# Patient Record
Sex: FEMALE | Race: WHITE | NOT HISPANIC OR LATINO | Employment: UNEMPLOYED | URBAN - METROPOLITAN AREA
[De-identification: names, ages, dates, MRNs, and addresses within clinical notes are randomized per-mention and may not be internally consistent; named-entity substitution may affect disease eponyms.]

---
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
## 2020-02-03 ENCOUNTER — TELEPHONE (OUTPATIENT)
Dept: OBSTETRICS AND GYNECOLOGY | Facility: CLINIC | Age: 20
End: 2020-02-03

## 2020-02-03 ENCOUNTER — TELEPHONE (OUTPATIENT)
Dept: PHARMACY | Facility: CLINIC | Age: 20
End: 2020-02-03

## 2020-02-03 ENCOUNTER — OFFICE VISIT (OUTPATIENT)
Dept: PSYCHIATRY | Facility: CLINIC | Age: 20
End: 2020-02-03
Payer: COMMERCIAL

## 2020-02-03 VITALS
SYSTOLIC BLOOD PRESSURE: 105 MMHG | DIASTOLIC BLOOD PRESSURE: 54 MMHG | BODY MASS INDEX: 21.37 KG/M2 | WEIGHT: 116.88 LBS | HEART RATE: 66 BPM

## 2020-02-03 DIAGNOSIS — F41.9 ANXIETY: ICD-10-CM

## 2020-02-03 DIAGNOSIS — F34.0 CYCLOTHYMIC DISORDER: ICD-10-CM

## 2020-02-03 DIAGNOSIS — F34.81 DMDD (DISRUPTIVE MOOD DYSREGULATION DISORDER): Primary | ICD-10-CM

## 2020-02-03 PROCEDURE — 99213 PR OFFICE/OUTPT VISIT, EST, LEVL III, 20-29 MIN: ICD-10-PCS | Mod: S$GLB,,, | Performed by: NURSE PRACTITIONER

## 2020-02-03 PROCEDURE — 99999 PR PBB SHADOW E&M-EST. PATIENT-LVL II: CPT | Mod: PBBFAC,,, | Performed by: NURSE PRACTITIONER

## 2020-02-03 PROCEDURE — 99999 PR PBB SHADOW E&M-EST. PATIENT-LVL II: ICD-10-PCS | Mod: PBBFAC,,, | Performed by: NURSE PRACTITIONER

## 2020-02-03 PROCEDURE — 99212 OFFICE O/P EST SF 10 MIN: CPT | Mod: PBBFAC | Performed by: NURSE PRACTITIONER

## 2020-02-03 PROCEDURE — 99213 OFFICE O/P EST LOW 20 MIN: CPT | Mod: S$GLB,,, | Performed by: NURSE PRACTITIONER

## 2020-02-03 RX ORDER — HYDROXYZINE PAMOATE 25 MG/1
25 CAPSULE ORAL 4 TIMES DAILY
Qty: 60 CAPSULE | Refills: 1 | Status: SHIPPED | OUTPATIENT
Start: 2020-02-03 | End: 2020-05-04 | Stop reason: SDUPTHER

## 2020-02-03 RX ORDER — ARIPIPRAZOLE 5 MG/1
5 TABLET ORAL DAILY
Qty: 30 TABLET | Refills: 2 | Status: SHIPPED | OUTPATIENT
Start: 2020-02-03 | End: 2020-04-29

## 2020-02-03 NOTE — PROGRESS NOTES
"Outpatient Psychiatry Follow-Up Visit (MD/NP)    2/3/2020    Clinical Status of Patient:  Outpatient (Ambulatory)    Chief Complaint:  Cydney Patel is a 19 y.o. female who presents today for follow-up of depression.  Met with patient.      Interval History and Content of Current Session:  Interim Events/Subjective Report/Content of Current Session:   Cydney Patel arrived early for her appointment. She is well dressed with good hygiene and grooming.  She reports she presents to restart her medications. She explains they said I had post-partum but she disagrees. She explains that her "baby daddy" was a piece of shit. She admits she didn't want to hold him for about a week at first due to fear and some depression. She feels she may have been over thinking things- when he smiled he wouldn't smile at her.  She admit she was never on her medications continuously. She is now motivated to stay on her medications to be well for her baby. Her sleep has been fluctuating - will get about 2 hours a night for about a week or 2 then crash for 2 days. She admits she never took the Prozac but felt good on Abilify and as needed vistaril. She reports her mom notices a difference and feels she is "much better" when on these medications. Mood overall "is fine" but reports her friends would say irritable but she feels its stress. Appetite - poor related to body image- wants prepregnancy body- eating one meal a day. Declines referral to nutritionist.  Energy is low. Denies SI at present but admits to some a few weeks ago. No thoughts of wanting to harm her baby. Denies SI/HI/AH/VH paranoia or delusions.       Psychotherapy:  · Target symptoms: depression, adjustment  · Why chosen therapy is appropriate versus another modality: relevant to diagnosis  · Outcome monitoring methods: self-report, observation, feedback from family  · Therapeutic intervention type: behavior modifying psychotherapy, supportive " "psychotherapy  · Topics discussed/themes: difficulty managing affect in interpersonal relationships, building skills sets for symptom management, symptom recognition, life stage transitional issues  · The patient's response to the intervention is guarded. The patient's progress toward treatment goals is fair.   · Duration of intervention: 17 minutes.    Review of Systems   · PSYCHIATRIC: Pertinant items are noted in the narrative.  · ENDOCRINE: No polydipsia or polyuria.  · INTEGUMENTARY: No rashes or lacerations.  · ENT: No dizziness, tinnitus or hearing loss.  · CARDIOVASCULAR: No tachycardia or chest pain.  · GENITOURINARY: No frequency, dysuria or sexual dysfunction.  · HEMATOLOGIC/LYMPHATIC: No excessive bleeding, prolonged or excessive bleeding after dental extraction/injury.    Past Medical, Family and Social History: The patient's past medical, family and social history have been reviewed and updated as appropriate within the electronic medical record - see encounter notes.    Compliance: no    Side effects: N/A    Risk Parameters:  Patient reports no suicidal ideation  Patient reports no homicidal ideation  Patient reports no self-injurious behavior  Patient reports no violent behavior    Exam (detailed: at least 9 elements; comprehensive: all 15 elements)   Constitutional  Vitals:  Most recent vital signs, dated less than 90 days prior to this appointment, were reviewed.   Vitals:    02/03/20 0947   BP: (!) 105/54   Pulse: 66   Weight: 53 kg (116 lb 13.5 oz)        General:  unremarkable, age appropriate     Musculoskeletal  Muscle Strength/Tone:  no tremor, no tic   Gait & Station:  non-ataxic     Psychiatric  Speech:  no latency; no press   Mood & Affect:  "agititated"   congruent and appropriate   Thought Process:  normal and logical   Associations:  intact   Thought Content:  normal, no suicidality, no homicidality, delusions, or paranoia   Insight:  has awareness of illness   Judgement: behavior is " "adequate to circumstances   Orientation:  grossly intact   Memory: intact for content of interview   Language: grossly intact   Attention Span & Concentration:  able to focus   Fund of Knowledge:  intact and appropriate to age and level of education     Assessment and Diagnosis   Status/Progress: Based on the examination today, the patient's problem(s) is/are inadequately controlled.  New problems have not been presented today.   Co-morbidities, Diagnostic uncertainty and Lack of compliance are not complicating management of the primary condition.  There are no active rule-out diagnoses for this patient at this time.     General Impression: Cydney Patel is a 18 y/o female who presents today for follow-up of depression and mood disorder. She describes symptoms of zechariah and depression since last visit. Reports her anxiety and depression are up and down. Describes extremes in mood "no happy medium." Denies self-harm or suicide attempts. Admits she relapsed on drugs since last visit and attributes and stopped taking her medications. Continues to have parent-child relationship problems.  Admits she did not take the Abilify as long as she feels she should have in order to see full affects. Denies SI/HI/AH/VH paranoia, delusions.  No overt symptoms of zechariah - no pressured rapid speech. Thought process linear and goal-directed. Affect appropriate. Encouraged restarting psychotherapy. Patient verbalized motivation for compliance with medications and all other elements of treatment plan.  Presents 2/3/20- requests to restart Abilify and vistaril after having her baby in November. Denies desire for anti-depressants- never took Prozac when prescribed.       ICD-10-CM ICD-9-CM   1. DMDD (disruptive mood dysregulation disorder) F34.81 296.99   2. Cyclothymic disorder F34.0 301.13   3. Anxiety F41.9 300.00       Intervention/Counseling/Treatment Plan   · Medication Management: Continue current medications.   ? Restart Vistrail 25 " mg as needed  ? Restart Abilify 5 mg daily  ? The risks and benefits of medication were discussed with the patient.  · Discussed diagnosis, risks and benefits of proposed treatment above vs alternative treatments vs no treatment, and potential side effects of these treatments. The patient expresses understanding of the above and displays the capacity to agree with this treatment given said understanding. Patient also agrees that, currently, the benefits outweigh the risks and would like to pursue treatment at this time.  · Encouraged Patient to keep future appointments.   · Take medications as prescribed and abstain from substance abuse.   · In the event of an emergency patient was advised to go to the emergency room     Referral to: Outpatient Psychotherapy    Return to Clinic: 3 months     Juana Concepcion DNP

## 2020-02-03 NOTE — TELEPHONE ENCOUNTER
----- Message from Sosa Meyers sent at 2/3/2020  9:24 AM CST -----  Contact: pt  Name of Who is Calling: pt    What is the request in detail: states she was suppose to receive a call in regards to her Mirena procedure being scheduled and she has been waiting 2 weeks now with no call. Please contact to further discuss and advise      Can the clinic reply by [facility]: no    [facility]: [phone]

## 2020-02-03 NOTE — TELEPHONE ENCOUNTER
Pt notified that she has a $0.00 copay for Mirena on her pharmacy benefit but she may have a copay for her office visit. She declined pharmacist consultation. Will confirm delivery with [facility] staff.

## 2020-02-04 NOTE — TELEPHONE ENCOUNTER
Spoke with pt. Advised pt we will be receiving her IUD on 2/6/20. Insertion appt scheduled for 2/10/20. Pt verbalized understanding.

## 2020-02-10 ENCOUNTER — PROCEDURE VISIT (OUTPATIENT)
Dept: OBSTETRICS AND GYNECOLOGY | Facility: CLINIC | Age: 20
End: 2020-02-10
Payer: COMMERCIAL

## 2020-02-10 VITALS
SYSTOLIC BLOOD PRESSURE: 116 MMHG | WEIGHT: 116.38 LBS | HEIGHT: 62 IN | BODY MASS INDEX: 21.42 KG/M2 | DIASTOLIC BLOOD PRESSURE: 72 MMHG

## 2020-02-10 DIAGNOSIS — Z30.430 ENCOUNTER FOR IUD INSERTION: Primary | ICD-10-CM

## 2020-02-10 LAB
B-HCG UR QL: NEGATIVE
CTP QC/QA: YES

## 2020-02-10 PROCEDURE — 81025 POCT URINE PREGNANCY: ICD-10-PCS | Mod: S$GLB,,, | Performed by: OBSTETRICS & GYNECOLOGY

## 2020-02-10 PROCEDURE — 58300 INSERT INTRAUTERINE DEVICE: CPT | Mod: S$GLB,,, | Performed by: OBSTETRICS & GYNECOLOGY

## 2020-02-10 PROCEDURE — 81025 URINE PREGNANCY TEST: CPT | Mod: S$GLB,,, | Performed by: OBSTETRICS & GYNECOLOGY

## 2020-02-10 PROCEDURE — 58300 INSERTION OF IUD: ICD-10-PCS | Mod: S$GLB,,, | Performed by: OBSTETRICS & GYNECOLOGY

## 2020-02-10 NOTE — PROCEDURES
Insertion of IUD  Date/Time: 2/10/2020 8:30 AM  Performed by: ABHISHEK Alaniz MD  Authorized by: ABHISHEK Alaniz MD     Consent:     Consent obtained:  Written    Consent given by:  Patient    Procedure risks and benefits discussed: yes      Patient questions answered: yes      Patient agrees, verbalizes understanding, and wants to proceed: yes      Educational handouts given: yes      Instructions and paperwork completed: yes    Procedure:     Pelvic exam performed: yes      Negative GC/chlamydia test: no      Negative urine pregnancy test: yes      Cervix cleaned and prepped: yes      Speculum placed in vagina: yes      Tenaculum applied to cervix: yes      Uterus sounded: yes      Uterus sound depth (cm):  8    IUD inserted with no complications: yes      Strings trimmed: yes    Post-procedure:     Patient tolerated procedure well: yes      Patient will follow up after next period: yes

## 2020-02-10 NOTE — PROGRESS NOTES
Discussed mental health with patient, she has started seeing a psychiatrist and restarted medications. She says her mood is currently stable.

## 2020-02-11 ENCOUNTER — OFFICE VISIT (OUTPATIENT)
Dept: URGENT CARE | Facility: CLINIC | Age: 20
End: 2020-02-11
Payer: COMMERCIAL

## 2020-02-11 VITALS
OXYGEN SATURATION: 100 % | HEART RATE: 65 BPM | TEMPERATURE: 98 F | SYSTOLIC BLOOD PRESSURE: 113 MMHG | BODY MASS INDEX: 21.35 KG/M2 | DIASTOLIC BLOOD PRESSURE: 71 MMHG | WEIGHT: 116 LBS | HEIGHT: 62 IN | RESPIRATION RATE: 16 BRPM

## 2020-02-11 DIAGNOSIS — J06.9 UPPER RESPIRATORY TRACT INFECTION, UNSPECIFIED TYPE: ICD-10-CM

## 2020-02-11 DIAGNOSIS — J02.9 SORE THROAT: Primary | ICD-10-CM

## 2020-02-11 LAB
CTP QC/QA: YES
CTP QC/QA: YES
FLUAV AG NPH QL: NEGATIVE
FLUBV AG NPH QL: NEGATIVE
MOLECULAR STREP A: NEGATIVE

## 2020-02-11 PROCEDURE — 87804 INFLUENZA ASSAY W/OPTIC: CPT | Mod: QW,S$GLB,, | Performed by: EMERGENCY MEDICINE

## 2020-02-11 PROCEDURE — 87804 POCT INFLUENZA A/B: ICD-10-PCS | Mod: 59,QW,S$GLB, | Performed by: EMERGENCY MEDICINE

## 2020-02-11 PROCEDURE — 87651 STREP A DNA AMP PROBE: CPT | Mod: QW,S$GLB,, | Performed by: EMERGENCY MEDICINE

## 2020-02-11 PROCEDURE — 99214 PR OFFICE/OUTPT VISIT, EST, LEVL IV, 30-39 MIN: ICD-10-PCS | Mod: 25,S$GLB,, | Performed by: EMERGENCY MEDICINE

## 2020-02-11 PROCEDURE — 99214 OFFICE O/P EST MOD 30 MIN: CPT | Mod: 25,S$GLB,, | Performed by: EMERGENCY MEDICINE

## 2020-02-11 PROCEDURE — 87651 POCT STREP A MOLECULAR: ICD-10-PCS | Mod: QW,S$GLB,, | Performed by: EMERGENCY MEDICINE

## 2020-02-11 NOTE — LETTER
February 11, 2020      [facility] [address]  [facility] [address]  Phone: [phone]  Fax: [phone]       Patient: Cydney Patel   YOB: 2000  Date of Visit: 02/11/2020    To Whom It May Concern:    Vik Patel  was at [facility] on 02/11/2020. She may return to work/school on 2/13/20 with no restrictions. If you have any questions or concerns, or if I can be of further assistance, please do not hesitate to contact me.    Sincerely,    Can Landry III, MD

## 2020-02-12 ENCOUNTER — HOSPITAL ENCOUNTER (EMERGENCY)
Facility: OTHER | Age: 20
Discharge: HOME | End: 2020-02-12
Attending: EMERGENCY MEDICINE
Payer: COMMERCIAL

## 2020-02-12 VITALS
WEIGHT: 116 LBS | RESPIRATION RATE: 18 BRPM | OXYGEN SATURATION: 98 % | HEIGHT: 62 IN | DIASTOLIC BLOOD PRESSURE: 59 MMHG | SYSTOLIC BLOOD PRESSURE: 106 MMHG | BODY MASS INDEX: 21.35 KG/M2 | TEMPERATURE: 98 F | HEART RATE: 82 BPM

## 2020-02-12 DIAGNOSIS — J02.9 PHARYNGITIS, UNSPECIFIED ETIOLOGY: Primary | ICD-10-CM

## 2020-02-12 LAB
B-HCG UR QL: NEGATIVE
CTP QC/QA: YES
DEPRECATED S PYO AG THROAT QL EIA: NEGATIVE

## 2020-02-12 PROCEDURE — 96372 THER/PROPH/DIAG INJ SC/IM: CPT

## 2020-02-12 PROCEDURE — 99284 EMERGENCY DEPT VISIT MOD MDM: CPT | Mod: 25

## 2020-02-12 PROCEDURE — 81025 URINE PREGNANCY TEST: CPT | Performed by: EMERGENCY MEDICINE

## 2020-02-12 PROCEDURE — 87147 CULTURE TYPE IMMUNOLOGIC: CPT | Mod: 59

## 2020-02-12 PROCEDURE — 63600175 PHARM REV CODE 636 W HCPCS: Mod: JG | Performed by: NURSE PRACTITIONER

## 2020-02-12 PROCEDURE — 87081 CULTURE SCREEN ONLY: CPT

## 2020-02-12 PROCEDURE — 87880 STREP A ASSAY W/OPTIC: CPT

## 2020-02-12 RX ORDER — DEXAMETHASONE SODIUM PHOSPHATE 4 MG/ML
8 INJECTION, SOLUTION INTRA-ARTICULAR; INTRALESIONAL; INTRAMUSCULAR; INTRAVENOUS; SOFT TISSUE
Status: COMPLETED | OUTPATIENT
Start: 2020-02-12 | End: 2020-02-12

## 2020-02-12 RX ORDER — KETOROLAC TROMETHAMINE 10 MG/1
10 TABLET, FILM COATED ORAL EVERY 6 HOURS
Qty: 12 TABLET | Refills: 0 | Status: SHIPPED | OUTPATIENT
Start: 2020-02-12 | End: 2020-05-04

## 2020-02-12 RX ADMIN — PENICILLIN G BENZATHINE 1.2 MILLION UNITS: 1200000 INJECTION, SUSPENSION INTRAMUSCULAR at 06:02

## 2020-02-12 RX ADMIN — DEXAMETHASONE SODIUM PHOSPHATE 8 MG: 4 INJECTION, SOLUTION INTRA-ARTICULAR; INTRALESIONAL; INTRAMUSCULAR; INTRAVENOUS; SOFT TISSUE at 06:02

## 2020-02-12 NOTE — PROGRESS NOTES
"Subjective:       Patient ID: Cydney Patel is a 19 y.o. female.    Vitals:  height is 5' 2" (1.575 m) and weight is 52.6 kg (116 lb). Her temperature is 97.6 °F (36.4 °C). Her blood pressure is 113/71 and her pulse is 65. Her respiration is 16 and oxygen saturation is 100%.     Chief Complaint: Sore Throat    Pt states sinus congestion with sore throat since yesterday.    Sore Throat    This is a new problem. The current episode started yesterday. The problem has been gradually worsening. The pain is worse on the right side. There has been no fever. The pain is at a severity of 7/10. Associated symptoms include congestion and swollen glands. Pertinent negatives include no coughing, diarrhea, headaches, shortness of breath or vomiting. Treatments tried: nyquil. The treatment provided mild relief.       Constitution: Negative for chills, fatigue and fever.   HENT: Positive for congestion, sinus pressure and sore throat.    Neck: Negative for painful lymph nodes.   Cardiovascular: Negative for chest pain and leg swelling.   Eyes: Negative for double vision and blurred vision.   Respiratory: Negative for cough and shortness of breath.    Gastrointestinal: Negative for nausea, vomiting and diarrhea.   Genitourinary: Negative for dysuria, frequency, urgency and history of kidney stones.   Musculoskeletal: Negative for joint pain, joint swelling, muscle cramps and muscle ache.   Skin: Negative for color change, pale, rash and bruising.   Allergic/Immunologic: Negative for seasonal allergies.   Neurological: Negative for dizziness, history of vertigo, light-headedness, passing out and headaches.   Hematologic/Lymphatic: Negative for swollen lymph nodes.   Psychiatric/Behavioral: Negative for nervous/anxious, sleep disturbance and depression. The patient is not nervous/anxious.        Objective:      Physical Exam   Constitutional: She is oriented to person, place, and time. She appears well-developed and " well-nourished. She is cooperative.  Non-toxic appearance. She does not have a sickly appearance. She does not appear ill. No distress.   HENT:   Head: Normocephalic and atraumatic.   Right Ear: Hearing, tympanic membrane, external ear and ear canal normal.   Left Ear: Hearing, tympanic membrane, external ear and ear canal normal.   Nose: Rhinorrhea present. No mucosal edema or nasal deformity. No epistaxis. Right sinus exhibits no maxillary sinus tenderness and no frontal sinus tenderness. Left sinus exhibits no maxillary sinus tenderness and no frontal sinus tenderness.   Mouth/Throat: Uvula is midline and mucous membranes are normal. No trismus in the jaw. Normal dentition. No uvula swelling. Oropharyngeal exudate and posterior oropharyngeal erythema present. No posterior oropharyngeal edema.   Eyes: Conjunctivae and lids are normal. No scleral icterus.   Neck: Trachea normal, full passive range of motion without pain and phonation normal. Neck supple. No neck rigidity. No edema and no erythema present.   Cardiovascular: Normal rate, regular rhythm, normal heart sounds, intact distal pulses and normal pulses.   Pulmonary/Chest: Effort normal and breath sounds normal. No respiratory distress. She has no decreased breath sounds. She has no rhonchi.   Occasional cough on exam   Abdominal: Normal appearance.   Musculoskeletal: Normal range of motion. She exhibits no edema or deformity.   Neurological: She is alert and oriented to person, place, and time. She exhibits normal muscle tone. Coordination normal.   Skin: Skin is warm, dry, intact, not diaphoretic and not pale.   Psychiatric: She has a normal mood and affect. Her speech is normal and behavior is normal. Judgment and thought content normal. Cognition and memory are normal.   Nursing note and vitals reviewed.        Assessment:       1. Sore throat    2. Upper respiratory tract infection, unspecified type        Plan:         Sore throat  -     POCT Influenza  A/B  -     POCT Strep A, Molecular    Upper respiratory tract infection, unspecified type          Patient Instructions     Zyrtec, Claritin, or Allegra OTC as directed for the next 7 days    Flonase OTC as directed for the next 7 days    Salt Water Nasal Spray OTC 3x/day for the next 7 days    Tylenol 500mg 2 tabs by mouth every 8 hours  Motrin 200mg 2 tabs by mouth every 8 hours  Alternate Tylenol and Motrin every 4hours    Mucinex or Robitussin OTC as directed for cough    Sudafed as directed for runny nose and congestion      Viral Upper Respiratory Illness (Adult)  You have a viral upper respiratory illness (URI), which is another term for the common cold. This illness is contagious during the first few days. It is spread through the air by coughing and sneezing. It may also be spread by direct contact (touching the sick person and then touching your own eyes, nose, or mouth). Frequent handwashing will decrease risk of spread. Most viral illnesses go away within 7 to 10 days with rest and simple home remedies. Sometimes the illness may last for several weeks. Antibiotics will not kill a virus, and they are generally not prescribed for this condition.    Home care  · If symptoms are severe, rest at home for the first 2 to 3 days. When you resume activity, don't let yourself get too tired.  · Avoid being exposed to cigarette smoke (yours or others).  · You may use acetaminophen or ibuprofen to control pain and fever, unless another medicine was prescribed. (Note: If you have chronic liver or kidney disease, have ever had a stomach ulcer or gastrointestinal bleeding, or are taking blood-thinning medicines, talk with your healthcare provider before using these medicines.) Aspirin should never be given to anyone under 18 years of age who is ill with a viral infection or fever. It may cause severe liver or brain damage.  · Your appetite may be poor, so a light diet is fine. Avoid dehydration by drinking 6 to 8  glasses of fluids per day (water, soft drinks, juices, tea, or soup). Extra fluids will help loosen secretions in the nose and lungs.  · Over-the-counter cold medicines will not shorten the length of time youre sick, but they may be helpful for the following symptoms: cough, sore throat, and nasal and sinus congestion. (Note: Do not use decongestants if you have high blood pressure.)  Follow-up care  Follow up with your healthcare provider, or as advised.  When to seek medical advice  Call your healthcare provider right away if any of these occur:  · Cough with lots of colored sputum (mucus)  · Severe headache; face, neck, or ear pain  · Difficulty swallowing due to throat pain  · Fever of 100.4°F (38°C)  Call 911, or get immediate medical care  Call emergency services right away if any of these occur:  · Chest pain, shortness of breath, wheezing, or difficulty breathing  · Coughing up blood  · Inability to swallow due to throat pain  Date Last Reviewed: 9/13/2015  © 9846-4469 [employer]. [address] [place] [address], [place] [address]. All rights reserved. This information is not intended as a substitute for professional medical care. Always follow your healthcare professional's instructions.

## 2020-02-12 NOTE — DISCHARGE INSTRUCTIONS
Thank you for allowing me to care for you today.  I hope our treatment plan will make you feel better in the next few days.  In order for me to take better care of my future patients and improve our Emergency Department, I would appreciate if you can provide us with feedback.  In the next few days, you may receive a survey in the mail.  If you do, it would mean a great deal to me if you would please take the time to complete it.    Thank you and I hope you feel better.  Radha Collazo NP

## 2020-02-12 NOTE — ED PROVIDER NOTES
Encounter Date: 2/12/2020       History     Chief Complaint   Patient presents with    Sore Throat     +Pt to ED with sore throat x3-4 days, seen at  and told she did not have strep throat. White patches noted to tonsils. Pt reporting painful swallowing, fever, chills.      The patient is a 19-year-old female with a past medical history of migraines, allergies, panic attacks, psychiatric hospitalization, and depression who presents to the ED complaining of fever, sore throat, and chills for the past 3-4 days.  No cough.  No sick contacts.  She was seen yesterday at urgent care where flu and strep swabs were both negative. No prescriptions provided at the time.  Patient reports that symptoms are worse today.  She does have a 3-month-old at home.  No other treatment attempted prior to arrival.    The history is provided by the patient.     Review of patient's allergies indicates:   Allergen Reactions    No known drug allergies      Past Medical History:   Diagnosis Date    Allergy     Depression     Headache(784.0)     migraine    History of psychiatric hospitalization     Hx of psychiatric care     Oppositional defiant disorder     Panic attacks     Psychiatric problem     Self-harming behavior     Suicide attempt     Therapy      No past surgical history on file.  Family History   Problem Relation Age of Onset    Stroke Father 43        unknown cause    Hypertension Father     Anxiety disorder Father     ADD / ADHD Sister     Migraines Mother     Anxiety disorder Mother     Heart attack Maternal Grandmother     Migraines Maternal Grandmother     Heart attack Maternal Grandfather     Stroke Paternal Grandmother     Heart attack Paternal Grandmother     Stroke Paternal Grandfather     Diabetes Neg Hx     Early death Neg Hx     Breast cancer Neg Hx     Colon cancer Neg Hx     Ovarian cancer Neg Hx      Social History     Tobacco Use    Smoking status: Never Smoker    Smokeless tobacco:  Never Used   Substance Use Topics    Alcohol use: Yes     Comment: socially    Drug use: No     Review of Systems   Constitutional: Positive for chills and fever.   HENT: Positive for sore throat.    Respiratory: Negative for shortness of breath.    Cardiovascular: Negative for chest pain.   Gastrointestinal: Negative for nausea and vomiting.   Genitourinary: Negative for dysuria.   Musculoskeletal: Negative for back pain.   Skin: Negative for rash.   Neurological: Negative for weakness.   Hematological: Does not bruise/bleed easily.       Physical Exam     Initial Vitals [02/12/20 1655]   BP Pulse Resp Temp SpO2   119/69 109 18 98.5 °F (36.9 °C) 97 %      MAP       --         Physical Exam    Nursing note and vitals reviewed.  Constitutional: She appears well-developed and well-nourished.  Non-toxic appearance. No distress.   The patient is alert, oriented, and nontoxic-appearing.  No muffled or hot potato voice.   HENT:   Head: Normocephalic and atraumatic.   Right Ear: Hearing, tympanic membrane, external ear and ear canal normal.   Left Ear: Hearing, tympanic membrane, external ear and ear canal normal.   Nose: Nose normal.   Mouth/Throat: Uvula is midline and mucous membranes are normal. No trismus in the jaw. Oropharyngeal exudate, posterior oropharyngeal edema and posterior oropharyngeal erythema present. No tonsillar abscesses.   Eyes: Conjunctivae and EOM are normal.   Neck: Full passive range of motion without pain. Neck supple.   Cardiovascular: Normal rate, regular rhythm, normal heart sounds and normal pulses. Exam reveals no gallop and no friction rub.    No murmur heard.  Pulmonary/Chest: Effort normal and breath sounds normal. No respiratory distress. She has no wheezes. She has no rhonchi. She has no rales.   Musculoskeletal: Normal range of motion.   Lymphadenopathy:     She has cervical adenopathy.   Neurological: She is alert and oriented to person, place, and time. She has normal strength.  Gait normal.   Skin: Skin is warm, dry and intact. Capillary refill takes less than 2 seconds. No rash noted.   Psychiatric: She has a normal mood and affect. Her speech is normal and behavior is normal. Judgment and thought content normal. Cognition and memory are normal.         ED Course   Procedures  Labs Reviewed   THROAT SCREEN, RAPID   POCT URINE PREGNANCY          Imaging Results    None          Medical Decision Making:   History:   Old Medical Records: I decided to obtain old medical records.  ED Management:  Based upon patient's history and physical exam, I believe they have streptococcal pharyngitis.  Centor score is 4.  I will treat based on Centor criteria, particularly since the patient has a 3-month-old at home.  I do not see any evidence of peritonsillar abscess, retropharyngeal abscess, sepsis, meningitis, epiglotitis, airway difficulty, or severe dehydration.  The patient will be treated with antibiotics, given specific return precautions, and instructed to followup with her regular doctor.  Magic mouthwash and anti-inflammatories also prescribed for symptomatic relief.    Plan of care discussed with the patient and they voiced understanding and agreement.  Patient advised to follow-up with PCP in 2 days for further evaluation.   Patient was stable for discharge.                                   Clinical Impression:       ICD-10-CM ICD-9-CM   1. Pharyngitis, unspecified etiology J02.9 462                             Radha Collazo NP  02/12/20 4578

## 2020-02-12 NOTE — ED TRIAGE NOTES
Pt reports that she was seen at Urgent Care yesterday for sore throat x 3 days. She reports fever at home, denies cough. She presents today for increased pain and swelling to throat. Pt is AAO x 3, answers questions appropriately

## 2020-02-12 NOTE — PATIENT INSTRUCTIONS
Zyrtec, Claritin, or Allegra OTC as directed for the next 7 days    Flonase OTC as directed for the next 7 days    Salt Water Nasal Spray OTC 3x/day for the next 7 days    Tylenol 500mg 2 tabs by mouth every 8 hours  Motrin 200mg 2 tabs by mouth every 8 hours  Alternate Tylenol and Motrin every 4hours    Mucinex or Robitussin OTC as directed for cough    Sudafed as directed for runny nose and congestion      Viral Upper Respiratory Illness (Adult)  You have a viral upper respiratory illness (URI), which is another term for the common cold. This illness is contagious during the first few days. It is spread through the air by coughing and sneezing. It may also be spread by direct contact (touching the sick person and then touching your own eyes, nose, or mouth). Frequent handwashing will decrease risk of spread. Most viral illnesses go away within 7 to 10 days with rest and simple home remedies. Sometimes the illness may last for several weeks. Antibiotics will not kill a virus, and they are generally not prescribed for this condition.    Home care  · If symptoms are severe, rest at home for the first 2 to 3 days. When you resume activity, don't let yourself get too tired.  · Avoid being exposed to cigarette smoke (yours or others).  · You may use acetaminophen or ibuprofen to control pain and fever, unless another medicine was prescribed. (Note: If you have chronic liver or kidney disease, have ever had a stomach ulcer or gastrointestinal bleeding, or are taking blood-thinning medicines, talk with your healthcare provider before using these medicines.) Aspirin should never be given to anyone under 18 years of age who is ill with a viral infection or fever. It may cause severe liver or brain damage.  · Your appetite may be poor, so a light diet is fine. Avoid dehydration by drinking 6 to 8 glasses of fluids per day (water, soft drinks, juices, tea, or soup). Extra fluids will help loosen secretions in the nose and  lungs.  · Over-the-counter cold medicines will not shorten the length of time youre sick, but they may be helpful for the following symptoms: cough, sore throat, and nasal and sinus congestion. (Note: Do not use decongestants if you have high blood pressure.)  Follow-up care  Follow up with your healthcare provider, or as advised.  When to seek medical advice  Call your healthcare provider right away if any of these occur:  · Cough with lots of colored sputum (mucus)  · Severe headache; face, neck, or ear pain  · Difficulty swallowing due to throat pain  · Fever of 100.4°F (38°C)  Call 911, or get immediate medical care  Call emergency services right away if any of these occur:  · Chest pain, shortness of breath, wheezing, or difficulty breathing  · Coughing up blood  · Inability to swallow due to throat pain  Date Last Reviewed: 9/13/2015  © 6058-9609 [employer]. [address] [place] [address], [place] [address]. All rights reserved. This information is not intended as a substitute for professional medical care. Always follow your healthcare professional's instructions.

## 2020-02-14 LAB
BACTERIA THROAT CULT: ABNORMAL
BACTERIA THROAT CULT: ABNORMAL

## 2020-04-29 RX ORDER — ARIPIPRAZOLE 5 MG/1
TABLET ORAL
Qty: 30 TABLET | Refills: 0 | Status: SHIPPED | OUTPATIENT
Start: 2020-04-29 | End: 2020-05-04 | Stop reason: SDUPTHER

## 2020-05-01 ENCOUNTER — PATIENT MESSAGE (OUTPATIENT)
Dept: PSYCHIATRY | Facility: CLINIC | Age: 20
End: 2020-05-01

## 2020-05-04 ENCOUNTER — OFFICE VISIT (OUTPATIENT)
Dept: PSYCHIATRY | Facility: CLINIC | Age: 20
End: 2020-05-04
Payer: COMMERCIAL

## 2020-05-04 DIAGNOSIS — F34.0 CYCLOTHYMIC DISORDER: Primary | ICD-10-CM

## 2020-05-04 DIAGNOSIS — F41.9 ANXIETY: ICD-10-CM

## 2020-05-04 DIAGNOSIS — F34.81 DMDD (DISRUPTIVE MOOD DYSREGULATION DISORDER): ICD-10-CM

## 2020-05-04 PROCEDURE — 99213 PR OFFICE/OUTPT VISIT, EST, LEVL III, 20-29 MIN: ICD-10-PCS | Mod: 95,,, | Performed by: NURSE PRACTITIONER

## 2020-05-04 PROCEDURE — 99213 OFFICE O/P EST LOW 20 MIN: CPT | Mod: 95,,, | Performed by: NURSE PRACTITIONER

## 2020-05-04 RX ORDER — HYDROXYZINE PAMOATE 25 MG/1
25 CAPSULE ORAL 4 TIMES DAILY
Qty: 60 CAPSULE | Refills: 1 | Status: SHIPPED | OUTPATIENT
Start: 2020-05-04 | End: 2020-08-17

## 2020-05-04 RX ORDER — ARIPIPRAZOLE 10 MG/1
10 TABLET ORAL DAILY
Qty: 30 TABLET | Refills: 2 | Status: SHIPPED | OUTPATIENT
Start: 2020-05-04 | End: 2020-08-17

## 2020-05-04 RX ORDER — ESCITALOPRAM OXALATE 10 MG/1
10 TABLET ORAL DAILY
Qty: 90 TABLET | Refills: 0 | Status: SHIPPED | OUTPATIENT
Start: 2020-05-04 | End: 2020-10-15

## 2020-05-04 NOTE — PROGRESS NOTES
"Outpatient Psychiatry Follow-Up Visit (MD/NP)    5/4/2020    The patient location is: [address] [place] [address]  The chief complaint leading to consultation is: anxiety and irritablity  Visit type: audiovisual  Total time spent with patient: 12 minutes  Each patient to whom he or she provides medical services by telemedicine is:  (1) informed of the relationship between the physician and patient and the respective role of any other health care provider with respect to management of the patient; and (2) notified that he or she may decline to receive medical services by telemedicine and may withdraw from such care at any time.    Clinical Status of Patient:  Outpatient (Ambulatory)    Chief Complaint:  Cydney Patel is a 20 y.o. female who presents today for follow-up of depression.  Met with patient.      Interval History and Content of Current Session:  Interim Events/Subjective Report/Content of Current Session:   Cydney Patel checked in on time for her virtual visit. She does not feel Abilify is working how it use to be - having mood swings and increased anxiety. Denies new stressors. Reports waking up 'irritable this am but then within the minute I can change to a bubbly person."  Reports having migraines again but does not feel they are related to her anxiety. Denies ASE to Abilifiy.  Discussed increasing Abilify and adding an antidepressant. Did not feel Prozac helped her - reports being on it for at least a year. Discussed trial of Lexapro. Sleeping well except when her baby wakes up during the night - which is happening pretty often lately. Appetite is good, energy depends on the day.  Has not started therapy but admits it did help but concerned about insurance coverage. Denies SI/HI/AVH. No objective s/sx of psychosis or zechariah. Patient verbalized motivation for compliance with medications and all other elements of treatment plan.       Psychotherapy:  · Target symptoms: mood swings   · Why chosen " "therapy is appropriate versus another modality: relevant to diagnosis, patient responds to this modality  · Outcome monitoring methods: self-report, observation  · Therapeutic intervention type: insight oriented psychotherapy, behavior modifying psychotherapy, supportive psychotherapy  · Topics discussed/themes: difficulty managing affect in interpersonal relationships, building skills sets for symptom management, symptom recognition  · The patient's response to the intervention is accepting. The patient's progress toward treatment goals is fair.   · Duration of intervention: 5 minutes.    Review of Systems   · PSYCHIATRIC: Pertinant items are noted in the narrative.  · CONSTITUTIONAL: No weight gain or loss.   · ENT: No dizziness, tinnitus or hearing loss.  · RESPIRATORY: No shortness of breath.  · CARDIOVASCULAR: No tachycardia or chest pain.  · GASTROINTESTINAL: No nausea, vomiting, pain, constipation or diarrhea.    Past Medical, Family and Social History: The patient's past medical, family and social history have been reviewed and updated as appropriate within the electronic medical record - see encounter notes.    Compliance: yes    Side effects: None    Risk Parameters:  Patient reports no suicidal ideation  Patient reports no homicidal ideation  Patient reports no self-injurious behavior  Patient reports no violent behavior    Exam (detailed: at least 9 elements; comprehensive: all 15 elements)   Constitutional      General:  unremarkable, age appropriate   Psychiatric  Speech:  no latency; no press   Mood & Affect:  "ok"  congruent and appropriate   Thought Process:  normal and logical   Associations:  intact   Thought Content:  normal, no suicidality, no homicidality, delusions, or paranoia   Insight:  has awareness of illness   Judgement: behavior is adequate to circumstances   Orientation:  grossly intact   Memory: intact for content of interview   Language: grossly intact   Attention Span & " "Concentration:  able to focus   Fund of Knowledge:  intact and appropriate to age and level of education     Assessment and Diagnosis   Status/Progress: Based on the examination today, the patient's problem(s) is/are adequately but not ideally controlled.  New problems have not been presented today.   Co-morbidities, Diagnostic uncertainty and Lack of compliance are not complicating management of the primary condition.  There are no active rule-out diagnoses for this patient at this time.     General Impression: Cydney Patel is a 18 y/o female who presents today for follow-up of depression and mood disorder. She describes symptoms of zechariah and depression since last visit. Reports her anxiety and depression are up and down. Describes extremes in mood "no happy medium." Denies self-harm or suicide attempts. Admits she relapsed on drugs since last visit and attributes and stopped taking her medications. Continues to have parent-child relationship problems.  Admits she did not take the Abilify as long as she feels she should have in order to see full affects. Denies SI/HI/AH/VH paranoia, delusions.  No overt symptoms of zechariah - no pressured rapid speech. Thought process linear and goal-directed. Affect appropriate. Encouraged restarting psychotherapy. Patient verbalized motivation for compliance with medications and all other elements of treatment plan.  Presents 2/3/20- requests to restart Abilify and vistaril after having her baby in November. Denies desire for anti-depressants- never took Prozac when prescribed. Presents 5/4/20 - increased anxiety and irritability. Agreeable to trial of Lexapro.       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 DMDD (disruptive mood dysregulation disorder) F34.81 296.99   3. Anxiety F41.9 300.00       Intervention/Counseling/Treatment Plan   · Medication Management: Continue current medications.   ?  Vistrail 25 mg as needed  ? Increase Abilify 10 mg daily  ? Start " Lexapro 10 mg daily  ? The risks and benefits of medication were discussed with the patient.  · Discussed diagnosis, risks and benefits of proposed treatment above vs alternative treatments vs no treatment, and potential side effects of these treatments. The patient expresses understanding of the above and displays the capacity to agree with this treatment given said understanding. Patient also agrees that, currently, the benefits outweigh the risks and would like to pursue treatment at this time.  · Encouraged Patient to keep future appointments.   · Take medications as prescribed and abstain from substance abuse.   · In the event of an emergency patient was advised to go to the emergency room     Referral to: encouraged psychotherapy    Return to Clinic: 3 months     Juana Concepcion DNP

## 2020-07-17 ENCOUNTER — OFFICE VISIT (OUTPATIENT)
Dept: URGENT CARE | Facility: CLINIC | Age: 20
End: 2020-07-17
Payer: COMMERCIAL

## 2020-07-17 VITALS
HEART RATE: 99 BPM | TEMPERATURE: 99 F | OXYGEN SATURATION: 99 % | SYSTOLIC BLOOD PRESSURE: 115 MMHG | DIASTOLIC BLOOD PRESSURE: 67 MMHG

## 2020-07-17 DIAGNOSIS — S06.0X0A CONCUSSION WITHOUT LOSS OF CONSCIOUSNESS, INITIAL ENCOUNTER: ICD-10-CM

## 2020-07-17 DIAGNOSIS — T74.91XA DOMESTIC ABUSE OF ADULT, INITIAL ENCOUNTER: ICD-10-CM

## 2020-07-17 DIAGNOSIS — R11.0 NAUSEA: ICD-10-CM

## 2020-07-17 DIAGNOSIS — S09.90XA INJURY OF HEAD, INITIAL ENCOUNTER: Primary | ICD-10-CM

## 2020-07-17 PROCEDURE — S0119 PR ONDANSETRON, ORAL, 4MG: ICD-10-PCS | Mod: S$GLB,,, | Performed by: PHYSICIAN ASSISTANT

## 2020-07-17 PROCEDURE — 99214 OFFICE O/P EST MOD 30 MIN: CPT | Mod: S$GLB,,, | Performed by: PHYSICIAN ASSISTANT

## 2020-07-17 PROCEDURE — 99214 PR OFFICE/OUTPT VISIT, EST, LEVL IV, 30-39 MIN: ICD-10-PCS | Mod: S$GLB,,, | Performed by: PHYSICIAN ASSISTANT

## 2020-07-17 PROCEDURE — S0119 ONDANSETRON 4 MG: HCPCS | Mod: S$GLB,,, | Performed by: PHYSICIAN ASSISTANT

## 2020-07-17 RX ORDER — ONDANSETRON 4 MG/1
4 TABLET, ORALLY DISINTEGRATING ORAL
Status: COMPLETED | OUTPATIENT
Start: 2020-07-17 | End: 2020-07-17

## 2020-07-17 RX ORDER — ONDANSETRON 4 MG/1
4 TABLET, ORALLY DISINTEGRATING ORAL EVERY 8 HOURS PRN
Qty: 10 TABLET | Refills: 0 | Status: SHIPPED | OUTPATIENT
Start: 2020-07-17 | End: 2020-10-15

## 2020-07-17 RX ADMIN — ONDANSETRON 4 MG: 4 TABLET, ORALLY DISINTEGRATING ORAL at 05:07
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
## 2020-07-17 NOTE — PATIENT INSTRUCTIONS
- Rest.    - Drink plenty of fluids.    - Acetaminophen (tylenol) or Ibuprofen (advil,motrin) as directed as needed for fever/pain. Avoid tylenol if you have a history of liver disease. Do not take ibuprofen if you have a history of GI bleeding, kidney disease, or if you take blood thinners.   - you can take ibuprofen 600-800 mg every 6-8 hours for pain and inflammation.  You can also take Tylenol/acetaminophen 650-1000 mg every 6-8 hours for added pain relief.    - Ice for 15-20 minutes at a time for the next 24-48 hours.    - Elevate when possible.     - Take zofran (ondansetron) 4-8 mg every 8 hours as needed, as prescribed for nausea    - based on your evaluation today, I do not see evidence of skull fracture or brain bleed. Your neurological exam is normal.  You likely have a concussion based on the symptoms you are experiencing. See attached regarding head injury precautions. At this time, I do not think you need a CT of the head in the ER; though as discussed, we cannot rule this out in urgent care without CT.  Monitor symptoms closely, and if you develop any new or worsening symptoms, go immediately to the ER.  Call 911 or go to ER if you feel unsafe in domestic relationship.     - Follow up with your PCP or specialty clinic as directed in the next 1-2 weeks if not improved or as needed.  You can call [phone] to schedule an appointment with the appropriate provider.    - Go to the ER or seek medical attention immediately if you develop new or worsening symptoms.    - You must understand that you have received an Urgent Care treatment only and that you may be released before all of your medical problems are known or treated.   - You, the patient, will arrange for follow up care as instructed.   - If your condition worsens or fails to improve we recommend that you receive another evaluation at the ER immediately or contact your PCP to discuss your concerns or return here.         Head Injury  (Adult)    You have a head injury. It does not appear serious at this time. But symptoms of a more serious problem, such as a mild brain injury (concussion) or bruising or bleeding in the brain, may appear later. For this reason, you or someone caring for you will need to watch for the symptoms listed below. Once youre home, also be sure to follow any care instructions youre given.  Home care  Watch for the following symptoms  Seek emergency medical care if you have any of these symptoms over the next hours to days:   · Headache  · Nausea or vomiting  · Dizziness  · Sensitivity to light or noise  · Unusual sleepiness or grogginess  · Trouble falling asleep  · Personality changes  · Vision changes  · Memory loss  · Confusion  · Trouble walking or clumsiness  · Loss of consciousness (even for a short time)  · Inability to be awakened  · Stiff neck  · Weakness or numbness in any part of the body  · Seizures  General care  · If you were prescribed medicines for pain, use them as directed. Note: Dont take other medicines for pain without talking to your provider first.  · To help reduce swelling and pain, apply a cold source to the injured area for up to 20 minutes at a time. Do this as often as directed. Use a cold pack or bag of ice wrapped in a thin towel. Never apply a cold source directly to the skin.  · If you have cuts or scrapes as a result of your head injury, care for them as directed.  · For the next 24 hours (or longer, if instructed):  ¨ Dont drink alcohol or use sedatives or other medicines that make you sleepy.  ¨ Dont drive or operate machinery.  ¨ Dont do anything strenuous, such as heavy lifting or straining.  ¨ Limit tasks that require concentration. This includes reading, using a smartphone or computer, watching TV, and playing video games.  ¨ Dont return to sports or other activities that could result in another head injury.  Follow-up care  Follow up with your healthcare provider, or as  directed. If imaging tests were done, they will be reviewed by a doctor. You will be told the results and any new findings that may affect your care.  When to seek medical advice  Call your healthcare provider right away if any of these occur:  · Pain doesnt get better or worsens  · New or increased swelling or bruising  · Fever of 100.4°F (38°C) or higher, or as directed by your provider  · Increased redness, warmth, drainage, or bleeding from the injured area  · Fluid drainage or bleeding from the nose or ears  · Any depression or bony abnormality in the injured area  Date Last Reviewed: 9/26/2015  © 9614-8595 [employer]. [address] [place] [address], [place] [address]. All rights reserved. This information is not intended as a substitute for professional medical care. Always follow your healthcare professional's instructions.        Discharge Instructions for Concussion  You have been diagnosed with a concussion, a type of brain injury caused by a sudden impact to your head. It can also be caused by sudden movement of your brain inside your head, such as from forceful shaking. Some concussions are mild. Most people recover completely from mild concussions. But recovery may take days, weeks, or months. For some, symptoms may last even longer. Early care and monitoring are important to prevent long-term complications.  Home care  Do's and don'ts:   · Ask a friend or family member to stay with you for a few days. You should not be alone until you know how the injury has affected you.  · Tell your caregiver to wake you every 2 to 3 hours during the first night. Your caregiver should call 911 if he or she cant wake you, or if you are confused.  · Dont take any medicine--not even aspirin--unless your healthcare provider says it's OK. If you have a headache, try placing a cold, damp cloth on your forehead.  · Eat light. Clear liquids, such as broth or gelatin, are a good choice.  · Don't drink alcohol or use  "any recreational drugs.   · Don't return to sports or any activity that could cause you to hit your head until all symptoms are gone and you have been cleared by your doctor. A second head injury before full recovery from the first one can lead to serious brain injury.  · Avoid activities that require a lot of concentration or attention. This will allow your brain to rest and heal more quickly.  The best way to recover is to discuss symptoms with your healthcare provider and your family. Work closely with your healthcare provider and give your brain time to heal.  Follow-up care  Follow up with your healthcare provider, or as advised.     When to call your healthcare provider  Your caregiver should call 911 right away if you have fallen asleep, cannot be awakened, or you are confused.  Otherwise, call your healthcare provider right away if any of these occur:  · Vomiting  · Clear or bloody drainage from your nose or ear  · Constant drowsiness or trouble waking up  · Confusion or memory loss  · Blurred vision  · Trouble walking, talking, or concentrating  · Increased weakness or problems with coordination  · Constant headache that cant be relieved or gets worse  · Changes in behavior or personality   Date Last Reviewed: 11/5/2015  © 6701-7282 [employer]. [address] [place] [address]. All rights reserved. This information is not intended as a substitute for professional medical care. Always follow your healthcare professional's instructions.        Concussion    A concussion can be caused by a direct blow to the head, neck, face, or somewhere else on the body with the force being transmitted to the head. This may cause you to lose consciousness - be "knocked out" - but not always. Depending on the severity of the blow, it will take from a few hours up to a few days to get better. Sometimes symptoms may last a few months or longer. This is called post-concussion syndrome.  At first, you may " have a headache, nausea, vomiting, or dizziness. You may also have problems concentrating or remembering things. This is normal.  Symptoms should get better as the hours and days go by. Symptoms that get worse could be a sign of a more serious injury. This might be a bruise or bleeding in the brain. Thats why its important to watch for the warning signs listed below.  Home care  If your injury is mild and there are no serious signs or symptoms, your healthcare provider may recommend that you be monitored at home. If there is evidence that the injury is more serious, you will be monitored in the hospital. Follow these tips to help care for yourself at home:  · After a concussion, your healthcare provider may recommend that a family member or friend monitor you for 12 to 24 hours. They may be told to wake you every few hours during sleep to check for the signs below.  · If your face or scalp swells, apply an ice pack for 20 minutes every 1 to 2 hours. Do this until the swelling starts to go down. You can make an ice pack by putting ice cubes in a plastic bag and wrapping the bag in a towel.  · You may use acetaminophen to control pain, unless another pain medicine was prescribed. Do not use aspirin or ibuprofen after a head injury. If you have chronic liver or kidney disease, talk with your doctor before using these medicines. Also talk with your doctor if you ever had a stomach ulcer or gastrointestinal bleeding.  · For the next 24 hours:  ¨ Dont drink alcohol or take sedatives or medicines that make you sleepy.  ¨ Dont drive or operate machinery.  ¨ Avoid doing anything strenuous. Dont lift or strain.  · Dont return to sports or any activity that could cause you to hit your head until all symptoms are gone and you have been cleared by your doctor. A second head injury before fully recovering from the first one can lead to serious brain injury.  · Avoid doing activities that require a lot of concentration or a  lot of attention. This will allow your brain to rest and heal quicker.  Follow-up care  Follow up with your doctor in 1 week, or as directed.  Note: A radiologist will review any X-rays or CT scans that were taken. You will be told of any new findings that may affect your care.  When to seek medical advice  Call your healthcare provider right away if any of these occur:  · Repeated vomiting  · Headache or dizziness that is severe or gets worse  · Loss of consciousness  · Unusual drowsiness, or unable to wake up as usual  · Weakness or decreased ability to walk or move any limb  · Confusion, agitation, or change in behavior or speech, or memory loss  · Blurred vision  · Convulsion (seizure)  · Swelling on the scalp or face that gets worse  · Changes in pupil size (the black part of the eye)  · Redness, warmth, or pus from the swollen area  · Fluid draining from or bleeding from the nose or ears     Date Last Reviewed: 8/14/2015  © 4370-7858 [employer]. [address] [place] [address]. All rights reserved. This information is not intended as a substitute for professional medical care. Always follow your healthcare professional's instructions.        Coping with Concussion  Concussion is also known as mild traumatic brain injury (MTBI). It is often caused by a blow to the head, or a fall. You may have been unconscious for a few seconds or minutes after the injury. Or maybe you were dazed, confused, or saw stars. After this, you thought you were OK. Now, weeks or months later, youre having symptoms that may be caused by a concussion. The good news is that, in most people,  these symptoms will likely go away on their own. Most people with a concussion recover fully, with no need for treatment.     A cold compress can help relieve a headache.    What is a concussion?  A concussion is a mild form of brain injury. In some cases, the effects of a concussion go away within days of the injury. In  others, symptoms may continue for a few months. Fortunately, a concussion is temporary. Even when symptoms stay for months, they do go away over time. If they don't, or if your symptoms are worse, contact your healthcare provider.  Symptoms of a concussion  You may have noticed some of these symptoms:  · Headaches  · Irritability and other changes in behavior  · Problems remembering or concentrating  · Dizziness or lack of coordination  · Fatigue  · Problems sleeping  · Sensitivity to light and sound  · Vision changes  NOTE: If you have severe symptoms or trouble functioning, talk with your healthcare provider right away. If you had a more serious head injury than a concussion, you likely need treatment. Be sure to see your healthcare provider for an evaluation.   What you can do  Since the effects of a concussion go away over time, there isnt a lot you need to do. Be assured that this problem is temporary. Youll likely have a full recovery. In the meantime, talk with your healthcare provider about ways to relieve any symptoms that are bothering you. These tips may help:  · Don't return to sports or any activity that could cause you to hit your head until all symptoms are gone and you have been cleared by your doctor. A second head injury before fully recovering from the first one can lead to serious brain injury.  · Avoid doing activities that require a lot of concentration or a lot of attention. This will allow your brain to rest and heal more quickly.  · When you have a headache, put a cold compress or ice pack on the pain site. Rest in a quiet, darkened room.  · Stress can make symptoms worse. Help calm yourself by resting in a quiet place and imagining a peaceful scene. Relax your muscles by soaking in a hot bath or taking a hot shower.  · Take over-the-counter  acetaminophen to relieve headache pain. Take them as directed on the package. Do not take ibuprofen or aspirin after a head injury.  · If you become  dizzy, sit or lie down in a safe place until the sensation passes. Dont drive when you feel dizzy or disoriented.  · If youre having trouble sleeping, try to keep a regular sleep schedule. Go to bed and get up at the same time each day. Avoid or limit caffeine and nicotine. Also avoid alcohol. It may help you sleep at first, but your sleep will not be restful.  · Give yourself time to heal. Your recovery will take some time. When you have symptoms, remember that you wont feel this way forever. In time the symptoms will go away and youll be back to yourself.  If youre not feeling better  The effects of a concussion often go away in 7 to 10 days and the vast majority of people who have had a concussion have recovered after 3 months. If youre not feeling better as time passes, there may be something else going on. If your symptoms dont go away or you notice new ones, talk with your healthcare provider. He or she can help you get the treatment you need.   Date Last Reviewed: 8/17/2015  © 8694-7465 [employer]. [address] [place] [id number]. All rights reserved. This information is not intended as a substitute for professional medical care. Always follow your healthcare professional's instructions.

## 2020-07-24 ENCOUNTER — OFFICE VISIT (OUTPATIENT)
Dept: URGENT CARE | Facility: CLINIC | Age: 20
End: 2020-07-24
Payer: COMMERCIAL

## 2020-07-24 VITALS
DIASTOLIC BLOOD PRESSURE: 57 MMHG | HEART RATE: 102 BPM | SYSTOLIC BLOOD PRESSURE: 101 MMHG | BODY MASS INDEX: 21.16 KG/M2 | WEIGHT: 115 LBS | TEMPERATURE: 98 F | RESPIRATION RATE: 20 BRPM | OXYGEN SATURATION: 98 % | HEIGHT: 62 IN

## 2020-07-24 DIAGNOSIS — J02.0 STREP PHARYNGITIS: Primary | ICD-10-CM

## 2020-07-24 DIAGNOSIS — J02.9 SORE THROAT: ICD-10-CM

## 2020-07-24 LAB
CTP QC/QA: YES
MOLECULAR STREP A: POSITIVE

## 2020-07-24 PROCEDURE — 99214 PR OFFICE/OUTPT VISIT, EST, LEVL IV, 30-39 MIN: ICD-10-PCS | Mod: S$GLB,,, | Performed by: PHYSICIAN ASSISTANT

## 2020-07-24 PROCEDURE — 87651 STREP A DNA AMP PROBE: CPT | Mod: QW,S$GLB,, | Performed by: PHYSICIAN ASSISTANT

## 2020-07-24 PROCEDURE — 87651 POCT STREP A MOLECULAR: ICD-10-PCS | Mod: QW,S$GLB,, | Performed by: PHYSICIAN ASSISTANT

## 2020-07-24 PROCEDURE — 99214 OFFICE O/P EST MOD 30 MIN: CPT | Mod: S$GLB,,, | Performed by: PHYSICIAN ASSISTANT

## 2020-07-24 RX ORDER — AMOXICILLIN 500 MG/1
1000 CAPSULE ORAL EVERY 12 HOURS
Qty: 40 CAPSULE | Refills: 0 | Status: SHIPPED | OUTPATIENT
Start: 2020-07-24 | End: 2020-08-03

## 2020-07-24 NOTE — PATIENT INSTRUCTIONS
Pharyngitis: Strep (Confirmed)    You have had a positive test for strep throat. Strep throat is a contagious illness. It is spread by coughing, kissing or by touching others after touching your mouth or nose. Symptoms include throat pain that is worse with swallowing, aching all over, headache, and fever. It is treated with antibiotic medicine. This should help you start to feel better in 1 to 2 days.  Home care  · Rest at home. Drink plenty of fluids to you won't get dehydrated.  · No work or school for the first 2 days of taking the antibiotics. After this time, you will not be contagious. You can then return to school or work if you are feeling better.   · Take antibiotic medicine for the full 10 days, even if you feel better. This is very important to ensure the infection is treated. It is also important to prevent medicine-resistant germs from developing. If you were given an antibiotic shot, you don't need any more antibiotics.  · You may use acetaminophen or ibuprofen to control pain or fever, unless another medicine was prescribed for this. Talk with your doctor before taking these medicines if you have chronic liver or kidney disease. Also talk with your doctor if you have had a stomach ulcer or GI bleeding.  · Throat lozenges or sprays help reduce pain. Gargling with warm saltwater will also reduce throat pain. Dissolve 1/2 teaspoon of salt in 1 glass of warm water. This may be useful just before meals.   · Soft foods are OK. Avoid salty or spicy foods.  Follow-up care  Follow up with your healthcare provider or our staff if you don't get better over the next week.  When to seek medical advice  Call your healthcare provider right away if any of these occur:  · Fever of 100.4ºF (38ºC) or higher, or as directed by your healthcare provider  · New or worsening ear pain, sinus pain, or headache  · Painful lumps in the back of neck  · Stiff neck  · Lymph nodes getting larger or becoming soft in the  middle  · You can't swallow liquids or you can't open your mouth wide because of throat pain  · Signs of dehydration. These include very dark urine or no urine, sunken eyes, and dizziness.  · Trouble breathing or noisy breathing  · Muffled voice  · Rash  Date Last Reviewed: 4/13/2015  © 8512-9555 [employer]. [address] [place] [address], [place] [address]. All rights reserved. This information is not intended as a substitute for professional medical care. Always follow your healthcare professional's instructions.

## 2020-07-24 NOTE — PROGRESS NOTES
"Subjective:       Patient ID: Cydney Patel is a 20 y.o. female.    Vitals:  height is 5' 2" (1.575 m) and weight is 52.2 kg (115 lb). Her temperature is 98.1 °F (36.7 °C). Her blood pressure is 101/57 (abnormal) and her pulse is 102. Her respiration is 20 and oxygen saturation is 98%.     Chief Complaint: Sore Throat    19 y/o female presents with Sore throat for the past 3days. Pt reports having Strep throat earlier this year and her sxs are exactly the same. Pt denies fevers, chills, cough, dyspnea, SOB, nasal congestion, ear pain.     Sore Throat   This is a new problem. The pain is worse on the right side. The pain is at a severity of 6/10. Associated symptoms include swollen glands and trouble swallowing. Pertinent negatives include no congestion, coughing, diarrhea, headaches, shortness of breath or vomiting. She has tried acetaminophen for the symptoms.       Constitution: Negative for chills, fatigue and fever.   HENT: Positive for sore throat and trouble swallowing. Negative for congestion.    Neck: Negative for painful lymph nodes.   Cardiovascular: Negative for chest pain and leg swelling.   Eyes: Negative for double vision and blurred vision.   Respiratory: Negative for cough and shortness of breath.    Gastrointestinal: Negative for nausea, vomiting and diarrhea.   Genitourinary: Negative for dysuria, frequency, urgency and history of kidney stones.   Musculoskeletal: Negative for joint pain, joint swelling, muscle cramps and muscle ache.   Skin: Negative for color change, pale, rash and bruising.   Allergic/Immunologic: Negative for seasonal allergies.   Neurological: Negative for dizziness, history of vertigo, light-headedness, passing out and headaches.   Hematologic/Lymphatic: Negative for swollen lymph nodes.   Psychiatric/Behavioral: Negative for nervous/anxious, sleep disturbance and depression. The patient is not nervous/anxious.        Objective:      Physical Exam   Constitutional: She " is oriented to person, place, and time. She appears well-developed. She is cooperative.  Non-toxic appearance. She does not appear ill. No distress.   HENT:   Head: Normocephalic and atraumatic.   Ears:   Right Ear: Hearing, tympanic membrane, external ear and ear canal normal.   Left Ear: Hearing, tympanic membrane, external ear and ear canal normal.   Nose: Nose normal. No mucosal edema, rhinorrhea or nasal deformity. No epistaxis. Right sinus exhibits no maxillary sinus tenderness and no frontal sinus tenderness. Left sinus exhibits no maxillary sinus tenderness and no frontal sinus tenderness.   Mouth/Throat: Uvula is midline and mucous membranes are normal. No trismus in the jaw. Normal dentition. No uvula swelling. Posterior oropharyngeal edema and posterior oropharyngeal erythema present. No oropharyngeal exudate. Tonsils are 2+ on the right. Tonsils are 2+ on the left. No tonsillar exudate.   Eyes: Conjunctivae and lids are normal. No scleral icterus.   Neck: Trachea normal, full passive range of motion without pain and phonation normal. Neck supple. No neck rigidity. No edema and no erythema present.   Cardiovascular: Normal rate, regular rhythm, normal heart sounds and normal pulses.   Pulmonary/Chest: Effort normal and breath sounds normal. No respiratory distress. She has no decreased breath sounds. She has no rhonchi.   Abdominal: Normal appearance.   Musculoskeletal: Normal range of motion.         General: No deformity.   Lymphadenopathy:        Head (right side): Tonsillar adenopathy present. No submental and no submandibular adenopathy present.        Head (left side): No submental, no submandibular and no tonsillar adenopathy present.   Neurological: She is alert and oriented to person, place, and time. She exhibits normal muscle tone. Coordination normal.   Skin: Skin is warm, dry, intact, not diaphoretic and not pale. Psychiatric: Her speech is normal and behavior is normal. Judgment and thought  content normal.   Nursing note and vitals reviewed.          Results for orders placed or performed in visit on 07/24/20   POCT Strep A, Molecular   Result Value Ref Range    Molecular Strep A, POC Positive (A) Negative     Acceptable Yes        Assessment:       1. Strep pharyngitis    2. Sore throat        Plan:         Strep pharyngitis  -     amoxicillin (AMOXIL) 500 MG capsule; Take 2 capsules (1,000 mg total) by mouth every 12 (twelve) hours. for 10 days  Dispense: 40 capsule; Refill: 0  -     (Magic mouthwash) 1:1:1 Benadryl 12.5mg/5ml liq, aluminum & magnesium hydroxide-simehticone (Maalox), lidocaine viscous 2%; Swish and spit 5 mLs every 4 (four) hours as needed (sore throat).  Dispense: 60 mL; Refill: 0    Sore throat  -     POCT Strep A, Molecular      Patient Instructions     Pharyngitis: Strep (Confirmed)    You have had a positive test for strep throat. Strep throat is a contagious illness. It is spread by coughing, kissing or by touching others after touching your mouth or nose. Symptoms include throat pain that is worse with swallowing, aching all over, headache, and fever. It is treated with antibiotic medicine. This should help you start to feel better in 1 to 2 days.  Home care  · Rest at home. Drink plenty of fluids to you won't get dehydrated.  · No work or school for the first 2 days of taking the antibiotics. After this time, you will not be contagious. You can then return to school or work if you are feeling better.   · Take antibiotic medicine for the full 10 days, even if you feel better. This is very important to ensure the infection is treated. It is also important to prevent medicine-resistant germs from developing. If you were given an antibiotic shot, you don't need any more antibiotics.  · You may use acetaminophen or ibuprofen to control pain or fever, unless another medicine was prescribed for this. Talk with your doctor before taking these medicines if you  have chronic liver or kidney disease. Also talk with your doctor if you have had a stomach ulcer or GI bleeding.  · Throat lozenges or sprays help reduce pain. Gargling with warm saltwater will also reduce throat pain. Dissolve 1/2 teaspoon of salt in 1 glass of warm water. This may be useful just before meals.   · Soft foods are OK. Avoid salty or spicy foods.  Follow-up care  Follow up with your healthcare provider or our staff if you don't get better over the next week.  When to seek medical advice  Call your healthcare provider right away if any of these occur:  · Fever of 100.4ºF (38ºC) or higher, or as directed by your healthcare provider  · New or worsening ear pain, sinus pain, or headache  · Painful lumps in the back of neck  · Stiff neck  · Lymph nodes getting larger or becoming soft in the middle  · You can't swallow liquids or you can't open your mouth wide because of throat pain  · Signs of dehydration. These include very dark urine or no urine, sunken eyes, and dizziness.  · Trouble breathing or noisy breathing  · Muffled voice  · Rash  Date Last Reviewed: 4/13/2015  © 8193-6435 [employer]. [address] [place] [address], [place] [address]. All rights reserved. This information is not intended as a substitute for professional medical care. Always follow your healthcare professional's instructions.

## 2020-08-10 ENCOUNTER — OFFICE VISIT (OUTPATIENT)
Dept: URGENT CARE | Facility: CLINIC | Age: 20
End: 2020-08-10
Payer: COMMERCIAL

## 2020-08-10 VITALS
HEIGHT: 62 IN | DIASTOLIC BLOOD PRESSURE: 62 MMHG | BODY MASS INDEX: 21.16 KG/M2 | RESPIRATION RATE: 12 BRPM | WEIGHT: 115 LBS | HEART RATE: 54 BPM | SYSTOLIC BLOOD PRESSURE: 99 MMHG | TEMPERATURE: 100 F | OXYGEN SATURATION: 99 %

## 2020-08-10 DIAGNOSIS — M54.42 ACUTE BILATERAL LOW BACK PAIN WITH LEFT-SIDED SCIATICA: Primary | ICD-10-CM

## 2020-08-10 PROCEDURE — 99214 OFFICE O/P EST MOD 30 MIN: CPT | Mod: S$GLB,,, | Performed by: PHYSICIAN ASSISTANT

## 2020-08-10 PROCEDURE — 99214 PR OFFICE/OUTPT VISIT, EST, LEVL IV, 30-39 MIN: ICD-10-PCS | Mod: S$GLB,,, | Performed by: PHYSICIAN ASSISTANT

## 2020-08-10 RX ORDER — METHYLPREDNISOLONE 4 MG/1
TABLET ORAL
Qty: 1 PACKAGE | Refills: 0 | Status: SHIPPED | OUTPATIENT
Start: 2020-08-10 | End: 2020-10-15

## 2020-08-10 RX ORDER — NAPROXEN 500 MG/1
500 TABLET ORAL 2 TIMES DAILY WITH MEALS
Qty: 20 TABLET | Refills: 0 | Status: SHIPPED | OUTPATIENT
Start: 2020-08-10 | End: 2020-08-20

## 2020-08-10 RX ORDER — CYCLOBENZAPRINE HCL 5 MG
5 TABLET ORAL 3 TIMES DAILY PRN
Qty: 21 TABLET | Refills: 0 | Status: SHIPPED | OUTPATIENT
Start: 2020-08-10 | End: 2020-08-17

## 2020-08-10 RX ORDER — CELECOXIB 200 MG/1
200 CAPSULE ORAL 2 TIMES DAILY
Qty: 20 CAPSULE | Refills: 0 | Status: SHIPPED | OUTPATIENT
Start: 2020-08-10 | End: 2020-08-10 | Stop reason: CLARIF

## 2020-08-10 NOTE — PATIENT INSTRUCTIONS
- Rest.    - Drink plenty of fluids.      - take celecoxib twice a day as prescribed with food to help with pain and inflammation.  Do not take over the counter NSAIDs (aleve, advil, ibuprofen) while taking this medication.  You can take Tylenol as directed along with this medication for pain relief.    -you can take cyclobenzaprine (Flexeril) as directed, as needed.  This is a muscle relaxer that can help with tension of the muscles to help reduce pain. This can also help with muscle spasm. This medicine may cause drowsiness.  Do not drive for operate heavy machinery while on this medication.     - No heavy lifting.    - Icing a muscle strain is best for the first 24-48 hours after an injury. After that, low heat to areas of pain for 20 minutes at a time can help.  - Go to the ER for any weakness or sensation loss of the legs, or loss of bowel or bladder control.    - You received a steroid pack today.  This can elevate your blood pressure, elevate your blood sugar, water weight gain, nervous energy, redness to the face and dimpling of the skin where the shot goes in.   - Do not use steroids more than 3 times per year.   - If you have diabetes, please check you blood sugar frequently.  - If you have high blood pressure, please check your blood pressure frequently.     - Follow up with your PCP or specialty clinic (orthopedic back) as directed in the next 1-2 weeks if not improved or as needed.  I have placed referral for follow up if needed. You can call [phone] to schedule an appointment with the appropriate provider.    - Go to the ER or seek medical attention immediately if you develop new or worsening symptoms.    - You must understand that you have received an Urgent Care treatment only and that you may be released before all of your medical problems are known or treated.   - You, the patient, will arrange for follow up care as instructed.   - If your condition worsens or fails to improve we recommend  that you receive another evaluation at the ER immediately or contact your PCP to discuss your concerns or return here.
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
## 2020-08-17 ENCOUNTER — OFFICE VISIT (OUTPATIENT)
Dept: PSYCHIATRY | Facility: CLINIC | Age: 20
End: 2020-08-17
Payer: MEDICAID

## 2020-08-17 VITALS
WEIGHT: 111.88 LBS | DIASTOLIC BLOOD PRESSURE: 59 MMHG | BODY MASS INDEX: 20.46 KG/M2 | SYSTOLIC BLOOD PRESSURE: 107 MMHG | HEART RATE: 94 BPM

## 2020-08-17 DIAGNOSIS — F41.9 ANXIETY: ICD-10-CM

## 2020-08-17 DIAGNOSIS — F32.A DEPRESSION, UNSPECIFIED DEPRESSION TYPE: ICD-10-CM

## 2020-08-17 DIAGNOSIS — F34.0 CYCLOTHYMIC DISORDER: Primary | ICD-10-CM

## 2020-08-17 DIAGNOSIS — F10.10 ALCOHOL USE DISORDER, MILD, ABUSE: ICD-10-CM

## 2020-08-17 PROCEDURE — 99213 OFFICE O/P EST LOW 20 MIN: CPT | Mod: AF,HA,S$PBB, | Performed by: STUDENT IN AN ORGANIZED HEALTH CARE EDUCATION/TRAINING PROGRAM

## 2020-08-17 PROCEDURE — 99213 OFFICE O/P EST LOW 20 MIN: CPT | Mod: PBBFAC | Performed by: STUDENT IN AN ORGANIZED HEALTH CARE EDUCATION/TRAINING PROGRAM

## 2020-08-17 PROCEDURE — 99213 PR OFFICE/OUTPT VISIT, EST, LEVL III, 20-29 MIN: ICD-10-PCS | Mod: AF,HA,S$PBB, | Performed by: STUDENT IN AN ORGANIZED HEALTH CARE EDUCATION/TRAINING PROGRAM

## 2020-08-17 PROCEDURE — 99999 PR PBB SHADOW E&M-EST. PATIENT-LVL III: ICD-10-PCS | Mod: AF,HA,PBBFAC, | Performed by: STUDENT IN AN ORGANIZED HEALTH CARE EDUCATION/TRAINING PROGRAM

## 2020-08-17 PROCEDURE — 99999 PR PBB SHADOW E&M-EST. PATIENT-LVL III: CPT | Mod: AF,HA,PBBFAC, | Performed by: STUDENT IN AN ORGANIZED HEALTH CARE EDUCATION/TRAINING PROGRAM

## 2020-08-17 RX ORDER — ARIPIPRAZOLE 15 MG/1
15 TABLET ORAL DAILY
Qty: 30 TABLET | Refills: 1 | Status: SHIPPED | OUTPATIENT
Start: 2020-08-17 | End: 2021-01-07 | Stop reason: SDUPTHER

## 2020-08-17 RX ORDER — DULOXETIN HYDROCHLORIDE 60 MG/1
60 CAPSULE, DELAYED RELEASE ORAL DAILY
Qty: 30 CAPSULE | Refills: 1 | Status: SHIPPED | OUTPATIENT
Start: 2020-08-17 | End: 2020-12-08

## 2020-08-17 RX ORDER — HYDROXYZINE PAMOATE 50 MG/1
50 CAPSULE ORAL EVERY 6 HOURS PRN
Qty: 120 CAPSULE | Refills: 1 | Status: SHIPPED | OUTPATIENT
Start: 2020-08-17 | End: 2020-09-16
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
## 2020-08-24 ENCOUNTER — OFFICE VISIT (OUTPATIENT)
Dept: URGENT CARE | Facility: CLINIC | Age: 20
End: 2020-08-24
Payer: COMMERCIAL

## 2020-08-24 VITALS
SYSTOLIC BLOOD PRESSURE: 108 MMHG | BODY MASS INDEX: 21.16 KG/M2 | HEIGHT: 62 IN | OXYGEN SATURATION: 96 % | TEMPERATURE: 99 F | HEART RATE: 77 BPM | RESPIRATION RATE: 18 BRPM | WEIGHT: 115 LBS | DIASTOLIC BLOOD PRESSURE: 60 MMHG

## 2020-08-24 DIAGNOSIS — J02.0 STREP PHARYNGITIS: Primary | ICD-10-CM

## 2020-08-24 LAB
CTP QC/QA: YES
MOLECULAR STREP A: POSITIVE

## 2020-08-24 PROCEDURE — 99214 PR OFFICE/OUTPT VISIT, EST, LEVL IV, 30-39 MIN: ICD-10-PCS | Mod: S$GLB,,, | Performed by: PHYSICIAN ASSISTANT

## 2020-08-24 PROCEDURE — 99214 OFFICE O/P EST MOD 30 MIN: CPT | Mod: S$GLB,,, | Performed by: PHYSICIAN ASSISTANT

## 2020-08-24 PROCEDURE — 87651 POCT STREP A MOLECULAR: ICD-10-PCS | Mod: QW,S$GLB,, | Performed by: PHYSICIAN ASSISTANT

## 2020-08-24 PROCEDURE — 87651 STREP A DNA AMP PROBE: CPT | Mod: QW,S$GLB,, | Performed by: PHYSICIAN ASSISTANT

## 2020-08-24 RX ORDER — AMOXICILLIN AND CLAVULANATE POTASSIUM 875; 125 MG/1; MG/1
1 TABLET, FILM COATED ORAL 2 TIMES DAILY
Qty: 20 TABLET | Refills: 0 | Status: SHIPPED | OUTPATIENT
Start: 2020-08-24 | End: 2020-08-24 | Stop reason: CLARIF

## 2020-08-24 RX ORDER — AMOXICILLIN AND CLAVULANATE POTASSIUM 400; 57 MG/5ML; MG/5ML
875 POWDER, FOR SUSPENSION ORAL EVERY 12 HOURS
Qty: 218 ML | Refills: 0 | Status: SHIPPED | OUTPATIENT
Start: 2020-08-24 | End: 2020-09-03

## 2020-08-24 NOTE — PATIENT INSTRUCTIONS
- Rest.    - Drink plenty of fluids.    - Acetaminophen (tylenol) or Ibuprofen (advil,motrin) as directed as needed for fever/pain. Avoid tylenol if you have a history of liver disease. Do not take ibuprofen if you have a history of GI bleeding, kidney disease, or if you take blood thinners.     - You have been given an antibiotic to treat your condition today.    - Please complete the antibiotic as directed on the bottle.   - If you are female and on oral birth control pills, use additional methods to prevent pregnancy while on antibiotics and for one cycle after.   - you can take over-the-counter probiotics during and after antibiotic use to help preserve gut eduardo and reduce gastrointestinal symptoms  -Please take Augmentin with food as this medication may cause upset stomach    - Follow up with your PCP or specialty clinic as directed in the next 1-2 weeks if not improved or as needed.  You can call [phone] to schedule an appointment with the appropriate provider.    - Go to the ER or seek medical attention immediately if you develop new or worsening symptoms.    - You must understand that you have received an Urgent Care treatment only and that you may be released before all of your medical problems are known or treated.   - You, the patient, will arrange for follow up care as instructed.   - If your condition worsens or fails to improve we recommend that you receive another evaluation at the ER immediately or contact your PCP to discuss your concerns or return here.         Pharyngitis: Strep (Confirmed)    You have had a positive test for strep throat. Strep throat is a contagious illness. It is spread by coughing, kissing or by touching others after touching your mouth or nose. Symptoms include throat pain that is worse with swallowing, aching all over, headache, and fever. It is treated with antibiotic medicine. This should help you start to feel better in 1 to 2 days.  Home care  · Rest at home.  Drink plenty of fluids to you won't get dehydrated.  · No work or school for the first 2 days of taking the antibiotics. After this time, you will not be contagious. You can then return to school or work if you are feeling better.   · Take antibiotic medicine for the full 10 days, even if you feel better. This is very important to ensure the infection is treated. It is also important to prevent medicine-resistant germs from developing. If you were given an antibiotic shot, you don't need any more antibiotics.  · You may use acetaminophen or ibuprofen to control pain or fever, unless another medicine was prescribed for this. Talk with your doctor before taking these medicines if you have chronic liver or kidney disease. Also talk with your doctor if you have had a stomach ulcer or GI bleeding.  · Throat lozenges or sprays help reduce pain. Gargling with warm saltwater will also reduce throat pain. Dissolve 1/2 teaspoon of salt in 1 glass of warm water. This may be useful just before meals.   · Soft foods are OK. Avoid salty or spicy foods.  Follow-up care  Follow up with your healthcare provider or our staff if you don't get better over the next week.  When to seek medical advice  Call your healthcare provider right away if any of these occur:  · Fever of 100.4ºF (38ºC) or higher, or as directed by your healthcare provider  · New or worsening ear pain, sinus pain, or headache  · Painful lumps in the back of neck  · Stiff neck  · Lymph nodes getting larger or becoming soft in the middle  · You can't swallow liquids or you can't open your mouth wide because of throat pain  · Signs of dehydration. These include very dark urine or no urine, sunken eyes, and dizziness.  · Trouble breathing or noisy breathing  · Muffled voice  · Rash  Date Last Reviewed: 4/13/2015  © 4656-8794 [email or web address]. [address] [place] [address], [place] [address]. All rights reserved. This information is not intended as a substitute for  professional medical care. Always follow your healthcare professional's instructions.        Self-Care for Sore Throats    Sore throats happen for many reasons, such as colds, allergies, and infections caused by viruses or bacteria. In any case, your throat becomes red and sore. Your goal for self-care is to reduce your discomfort while giving your throat a chance to heal.  Moisten and soothe your throat  Tips include the following:  · Try a sip of water first thing after waking up.  · Keep your throat moist by drinking 6 or more glasses of clear liquids every day.  · Run a cool-air humidifier in your room overnight.  · Avoid cigarette smoke.   · Suck on throat lozenges, cough drops, hard candy, ice chips, or frozen fruit-juice bars. Use the sugar-free versions if your diet or medical condition requires them.  Gargle to ease irritation  Gargling every hour or 2 can ease irritation. Try gargling with 1 of these solutions:  · 1/4 teaspoon of salt in 1/2 cup of warm water  · An over-the-counter anesthetic gargle  Use medicine for more relief  Over-the-counter medicine can reduce sore throat symptoms. Ask your pharmacist if you have questions about which medicine to use:  · Ease pain with anesthetic sprays. Aspirin or an aspirin substitute also helps. Remember, never give aspirin to anyone 18 or younger, or if you are already taking blood thinners.   · For sore throats caused by allergies, try antihistamines to block the allergic reaction.  · Remember: unless a sore throat is caused by a bacterial infection, antibiotics wont help you.  Prevent future sore throats  Prevention tips include the following:  · Stop smoking or reduce contact with secondhand smoke. Smoke irritates the tender throat lining.  · Limit contact with pets and with allergy-causing substances, such as pollen and mold.  · When youre around someone with a sore throat or cold, wash your hands often to keep viruses or bacteria from spreading.  · Dont  strain your vocal cords.  Call your healthcare provider  Contact your healthcare provider if you have:  · A temperature over 101°F (38.3°C)  · White spots on the throat  · Great difficulty swallowing  · Trouble breathing  · A skin rash  · Recent exposure to someone else with strep bacteria  · Severe hoarseness and swollen glands in the neck or jaw   Date Last Reviewed: 8/1/2016  © 4187-3948 [email or web address]. [address] [place] [address], [place] [address]. All rights reserved. This information is not intended as a substitute for professional medical care. Always follow your healthcare professional's instructions.

## 2020-08-24 NOTE — PROGRESS NOTES
"Subjective:       Patient ID: Cydney Patel is a 20 y.o. female.    Vitals:  height is 5' 2" (1.575 m) and weight is 52.2 kg (115 lb). Her temperature is 98.5 °F (36.9 °C). Her blood pressure is 108/60 and her pulse is 77. Her respiration is 18 and oxygen saturation is 96%.     Chief Complaint: Sore Throat    Pt presents with sore throat that began yesterday. She denies fever, cough, congestion. She states it feels like previous episodes of strep. She only took 7/10 days of amoxicillin when she had strep one month ago, she states she lost the rx after that. Symptoms resolved completely but returned yesterday. No difficulty swallowing or breathing.     Sore Throat   This is a new problem. The current episode started yesterday. The problem has been gradually worsening. The pain is worse on the right side. There has been no fever. The pain is at a severity of 4/10. The pain is mild. Pertinent negatives include no congestion, coughing, ear pain, neck pain, shortness of breath, stridor, trouble swallowing or vomiting. She has tried nothing for the symptoms. The treatment provided no relief.       Constitution: Negative for chills, sweating, fatigue and fever.   HENT: Positive for sore throat. Negative for ear pain, congestion, sinus pain, sinus pressure, trouble swallowing and voice change.    Neck: Positive for painful lymph nodes. Negative for neck pain, neck stiffness, neck swelling and degenerative disc disease.   Cardiovascular: Negative for chest pain, leg swelling, palpitations and sob on exertion.   Eyes: Negative for eye redness.   Respiratory: Negative for chest tightness, cough, sputum production, bloody sputum, COPD, shortness of breath, stridor, wheezing and asthma.    Gastrointestinal: Negative for nausea and vomiting.   Musculoskeletal: Negative for muscle ache.   Skin: Negative for rash.   Allergic/Immunologic: Negative for seasonal allergies and asthma.   Hematologic/Lymphatic: Positive for " swollen lymph nodes.       Objective:      Physical Exam   Constitutional: She is oriented to person, place, and time. She appears well-developed. She is cooperative.  Non-toxic appearance. She does not appear ill. No distress.   HENT:   Head: Normocephalic and atraumatic.   Ears:   Right Ear: Hearing, tympanic membrane, external ear and ear canal normal.   Left Ear: Hearing, tympanic membrane, external ear and ear canal normal.   Nose: Nose normal. No mucosal edema, rhinorrhea or nasal deformity. No epistaxis. Right sinus exhibits no maxillary sinus tenderness and no frontal sinus tenderness. Left sinus exhibits no maxillary sinus tenderness and no frontal sinus tenderness.   Mouth/Throat: Uvula is midline and mucous membranes are normal. No trismus in the jaw. Normal dentition. No uvula swelling. Posterior oropharyngeal erythema present. No oropharyngeal exudate, posterior oropharyngeal edema, tonsillar abscesses or cobblestoning. Tonsils are 2+ on the right. Tonsils are 2+ on the left. Tonsillar exudate.   Eyes: Conjunctivae and lids are normal. No scleral icterus.   Neck: Trachea normal, full passive range of motion without pain and phonation normal. Neck supple. No neck rigidity. No edema and no erythema present.   Cardiovascular: Normal rate, regular rhythm, normal heart sounds and normal pulses.   Pulmonary/Chest: Effort normal and breath sounds normal. No accessory muscle usage or stridor. No tachypnea and no bradypnea. No respiratory distress. She has no decreased breath sounds. She has no wheezes. She has no rhonchi. She has no rales.   Abdominal: Normal appearance.   Musculoskeletal: Normal range of motion.         General: No deformity.   Lymphadenopathy:        Head (right side): No submandibular, no preauricular and no posterior auricular adenopathy present.        Head (left side): No submandibular, no preauricular and no posterior auricular adenopathy present.     She has cervical adenopathy.         Right cervical: Superficial cervical adenopathy present. No posterior cervical adenopathy present.       Left cervical: Superficial cervical adenopathy present. No posterior cervical adenopathy present.   Neurological: She is alert and oriented to person, place, and time. She exhibits normal muscle tone. Coordination normal.   Skin: Skin is warm, dry, intact, not diaphoretic and not pale. Psychiatric: Her speech is normal and behavior is normal. Judgment and thought content normal.   Nursing note and vitals reviewed.          Results for orders placed or performed in visit on 08/24/20   POCT Strep A, Molecular   Result Value Ref Range    Molecular Strep A, POC Positive (A) Negative     Acceptable Yes        Assessment:       1. Strep pharyngitis        Plan:         Strep pharyngitis  -     POCT Strep A, Molecular  -     Discontinue: amoxicillin-clavulanate 875-125mg (AUGMENTIN) 875-125 mg per tablet; Take 1 tablet by mouth 2 (two) times daily. for 10 days  Dispense: 20 tablet; Refill: 0  -     amoxicillin-clavulanate (AUGMENTIN) 400-57 mg/5 mL SusR; Take 10.9 mLs (872 mg total) by mouth every 12 (twelve) hours. for 10 days  Dispense: 218 mL; Refill: 0      Patient Instructions     - Rest.    - Drink plenty of fluids.    - Acetaminophen (tylenol) or Ibuprofen (advil,motrin) as directed as needed for fever/pain. Avoid tylenol if you have a history of liver disease. Do not take ibuprofen if you have a history of GI bleeding, kidney disease, or if you take blood thinners.     - You have been given an antibiotic to treat your condition today.    - Please complete the antibiotic as directed on the bottle.   - If you are female and on oral birth control pills, use additional methods to prevent pregnancy while on antibiotics and for one cycle after.   - you can take over-the-counter probiotics during and after antibiotic use to help preserve gut eduardo and reduce gastrointestinal symptoms  -Please take  Augmentin with food as this medication may cause upset stomach    - Follow up with your PCP or specialty clinic as directed in the next 1-2 weeks if not improved or as needed.  You can call [phone] to schedule an appointment with the appropriate provider.    - Go to the ER or seek medical attention immediately if you develop new or worsening symptoms.    - You must understand that you have received an Urgent Care treatment only and that you may be released before all of your medical problems are known or treated.   - You, the patient, will arrange for follow up care as instructed.   - If your condition worsens or fails to improve we recommend that you receive another evaluation at the ER immediately or contact your PCP to discuss your concerns or return here.         Pharyngitis: Strep (Confirmed)    You have had a positive test for strep throat. Strep throat is a contagious illness. It is spread by coughing, kissing or by touching others after touching your mouth or nose. Symptoms include throat pain that is worse with swallowing, aching all over, headache, and fever. It is treated with antibiotic medicine. This should help you start to feel better in 1 to 2 days.  Home care  · Rest at home. Drink plenty of fluids to you won't get dehydrated.  · No work or school for the first 2 days of taking the antibiotics. After this time, you will not be contagious. You can then return to school or work if you are feeling better.   · Take antibiotic medicine for the full 10 days, even if you feel better. This is very important to ensure the infection is treated. It is also important to prevent medicine-resistant germs from developing. If you were given an antibiotic shot, you don't need any more antibiotics.  · You may use acetaminophen or ibuprofen to control pain or fever, unless another medicine was prescribed for this. Talk with your doctor before taking these medicines if you have chronic liver or kidney disease.  Also talk with your doctor if you have had a stomach ulcer or GI bleeding.  · Throat lozenges or sprays help reduce pain. Gargling with warm saltwater will also reduce throat pain. Dissolve 1/2 teaspoon of salt in 1 glass of warm water. This may be useful just before meals.   · Soft foods are OK. Avoid salty or spicy foods.  Follow-up care  Follow up with your healthcare provider or our staff if you don't get better over the next week.  When to seek medical advice  Call your healthcare provider right away if any of these occur:  · Fever of 100.4ºF (38ºC) or higher, or as directed by your healthcare provider  · New or worsening ear pain, sinus pain, or headache  · Painful lumps in the back of neck  · Stiff neck  · Lymph nodes getting larger or becoming soft in the middle  · You can't swallow liquids or you can't open your mouth wide because of throat pain  · Signs of dehydration. These include very dark urine or no urine, sunken eyes, and dizziness.  · Trouble breathing or noisy breathing  · Muffled voice  · Rash  Date Last Reviewed: 4/13/2015  © 3267-3766 [employer]. [address] [place] [address]. All rights reserved. This information is not intended as a substitute for professional medical care. Always follow your healthcare professional's instructions.        Self-Care for Sore Throats    Sore throats happen for many reasons, such as colds, allergies, and infections caused by viruses or bacteria. In any case, your throat becomes red and sore. Your goal for self-care is to reduce your discomfort while giving your throat a chance to heal.  Moisten and soothe your throat  Tips include the following:  · Try a sip of water first thing after waking up.  · Keep your throat moist by drinking 6 or more glasses of clear liquids every day.  · Run a cool-air humidifier in your room overnight.  · Avoid cigarette smoke.   · Suck on throat lozenges, cough drops, hard candy, ice chips, or frozen  fruit-juice bars. Use the sugar-free versions if your diet or medical condition requires them.  Gargle to ease irritation  Gargling every hour or 2 can ease irritation. Try gargling with 1 of these solutions:  · 1/4 teaspoon of salt in 1/2 cup of warm water  · An over-the-counter anesthetic gargle  Use medicine for more relief  Over-the-counter medicine can reduce sore throat symptoms. Ask your pharmacist if you have questions about which medicine to use:  · Ease pain with anesthetic sprays. Aspirin or an aspirin substitute also helps. Remember, never give aspirin to anyone 18 or younger, or if you are already taking blood thinners.   · For sore throats caused by allergies, try antihistamines to block the allergic reaction.  · Remember: unless a sore throat is caused by a bacterial infection, antibiotics wont help you.  Prevent future sore throats  Prevention tips include the following:  · Stop smoking or reduce contact with secondhand smoke. Smoke irritates the tender throat lining.  · Limit contact with pets and with allergy-causing substances, such as pollen and mold.  · When youre around someone with a sore throat or cold, wash your hands often to keep viruses or bacteria from spreading.  · Dont strain your vocal cords.  Call your healthcare provider  Contact your healthcare provider if you have:  · A temperature over 101°F (38.3°C)  · White spots on the throat  · Great difficulty swallowing  · Trouble breathing  · A skin rash  · Recent exposure to someone else with strep bacteria  · Severe hoarseness and swollen glands in the neck or jaw   Date Last Reviewed: 8/1/2016 © 2000-2017 [employer]. [address] [place] [address], [place] [address]. All rights reserved. This information is not intended as a substitute for professional medical care. Always follow your healthcare professional's instructions.

## 2020-09-30 ENCOUNTER — OFFICE VISIT (OUTPATIENT)
Dept: PSYCHIATRY | Facility: CLINIC | Age: 20
End: 2020-09-30
Payer: COMMERCIAL

## 2020-09-30 ENCOUNTER — PATIENT MESSAGE (OUTPATIENT)
Dept: PSYCHIATRY | Facility: CLINIC | Age: 20
End: 2020-09-30

## 2020-09-30 DIAGNOSIS — F31.89 OTHER BIPOLAR DISORDER: ICD-10-CM

## 2020-09-30 PROCEDURE — 90791 PR PSYCHIATRIC DIAGNOSTIC EVALUATION: ICD-10-PCS | Mod: S$GLB,,, | Performed by: STUDENT IN AN ORGANIZED HEALTH CARE EDUCATION/TRAINING PROGRAM

## 2020-09-30 PROCEDURE — 90791 PSYCH DIAGNOSTIC EVALUATION: CPT | Mod: S$GLB,,, | Performed by: STUDENT IN AN ORGANIZED HEALTH CARE EDUCATION/TRAINING PROGRAM
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
## 2020-10-08 ENCOUNTER — OFFICE VISIT (OUTPATIENT)
Dept: PSYCHIATRY | Facility: CLINIC | Age: 20
End: 2020-10-08
Payer: COMMERCIAL

## 2020-10-08 DIAGNOSIS — F43.10 POSTTRAUMATIC STRESS DISORDER WITH DISSOCIATIVE SYMPTOMS: Primary | ICD-10-CM

## 2020-10-08 PROCEDURE — 90834 PSYTX W PT 45 MINUTES: CPT | Mod: S$GLB,,, | Performed by: STUDENT IN AN ORGANIZED HEALTH CARE EDUCATION/TRAINING PROGRAM

## 2020-10-08 PROCEDURE — 90834 PR PSYCHOTHERAPY W/PATIENT, 45 MIN: ICD-10-PCS | Mod: S$GLB,,, | Performed by: STUDENT IN AN ORGANIZED HEALTH CARE EDUCATION/TRAINING PROGRAM

## 2020-10-09 PROBLEM — F43.10 POSTTRAUMATIC STRESS DISORDER WITH DISSOCIATIVE SYMPTOMS: Status: ACTIVE | Noted: 2020-10-09
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
## 2020-10-15 ENCOUNTER — OFFICE VISIT (OUTPATIENT)
Dept: PSYCHIATRY | Facility: CLINIC | Age: 20
End: 2020-10-15
Payer: COMMERCIAL

## 2020-10-15 VITALS
WEIGHT: 117.63 LBS | SYSTOLIC BLOOD PRESSURE: 116 MMHG | HEART RATE: 94 BPM | BODY MASS INDEX: 21.51 KG/M2 | DIASTOLIC BLOOD PRESSURE: 59 MMHG

## 2020-10-15 DIAGNOSIS — F43.10 POSTTRAUMATIC STRESS DISORDER WITH DISSOCIATIVE SYMPTOMS: ICD-10-CM

## 2020-10-15 DIAGNOSIS — F41.9 ANXIETY: ICD-10-CM

## 2020-10-15 DIAGNOSIS — F34.0 CYCLOTHYMIC DISORDER: Primary | ICD-10-CM

## 2020-10-15 PROCEDURE — 99212 OFFICE O/P EST SF 10 MIN: CPT | Mod: PBBFAC | Performed by: NURSE PRACTITIONER

## 2020-10-15 PROCEDURE — 90792 PSYCH DIAG EVAL W/MED SRVCS: CPT | Mod: SA,HA,, | Performed by: NURSE PRACTITIONER

## 2020-10-15 PROCEDURE — 99999 PR PBB SHADOW E&M-EST. PATIENT-LVL II: ICD-10-PCS | Mod: PBBFAC,,, | Performed by: NURSE PRACTITIONER

## 2020-10-15 PROCEDURE — 99999 PR PBB SHADOW E&M-EST. PATIENT-LVL II: CPT | Mod: PBBFAC,,, | Performed by: NURSE PRACTITIONER

## 2020-10-15 PROCEDURE — 90792 PR PSYCHIATRIC DIAGNOSTIC EVALUATION W/MEDICAL SERVICES: ICD-10-PCS | Mod: SA,HA,, | Performed by: NURSE PRACTITIONER

## 2020-10-22 ENCOUNTER — OFFICE VISIT (OUTPATIENT)
Dept: PSYCHIATRY | Facility: CLINIC | Age: 20
End: 2020-10-22
Payer: MEDICAID

## 2020-10-22 DIAGNOSIS — F43.10 POSTTRAUMATIC STRESS DISORDER WITH DISSOCIATIVE SYMPTOMS: Primary | ICD-10-CM

## 2020-10-22 PROCEDURE — 90832 PR PSYCHOTHERAPY W/PATIENT, 30 MIN: ICD-10-PCS | Mod: 95,,, | Performed by: STUDENT IN AN ORGANIZED HEALTH CARE EDUCATION/TRAINING PROGRAM

## 2020-10-22 PROCEDURE — 90832 PSYTX W PT 30 MINUTES: CPT | Mod: 95,,, | Performed by: STUDENT IN AN ORGANIZED HEALTH CARE EDUCATION/TRAINING PROGRAM

## 2020-10-23 ENCOUNTER — PATIENT MESSAGE (OUTPATIENT)
Dept: PSYCHIATRY | Facility: CLINIC | Age: 20
End: 2020-10-23

## 2020-10-23 NOTE — PATIENT INSTRUCTIONS
Please write the impact statement like we discussed in session. I'll follow up with written instructions just in case you need a reminder of how to complete the exercise.

## 2020-10-26 ENCOUNTER — OFFICE VISIT (OUTPATIENT)
Dept: PSYCHIATRY | Facility: CLINIC | Age: 20
End: 2020-10-26
Payer: COMMERCIAL

## 2020-10-26 DIAGNOSIS — F43.10 POSTTRAUMATIC STRESS DISORDER WITH DISSOCIATIVE SYMPTOMS: Primary | ICD-10-CM

## 2020-10-26 PROCEDURE — 90834 PSYTX W PT 45 MINUTES: CPT | Mod: S$GLB,,, | Performed by: STUDENT IN AN ORGANIZED HEALTH CARE EDUCATION/TRAINING PROGRAM

## 2020-10-26 PROCEDURE — 99211 OFF/OP EST MAY X REQ PHY/QHP: CPT | Mod: PBBFAC | Performed by: STUDENT IN AN ORGANIZED HEALTH CARE EDUCATION/TRAINING PROGRAM

## 2020-10-26 PROCEDURE — 90834 PR PSYCHOTHERAPY W/PATIENT, 45 MIN: ICD-10-PCS | Mod: S$GLB,,, | Performed by: STUDENT IN AN ORGANIZED HEALTH CARE EDUCATION/TRAINING PROGRAM

## 2020-10-26 PROCEDURE — 99999 PR PBB SHADOW E&M-EST. PATIENT-LVL I: CPT | Mod: PBBFAC,,, | Performed by: STUDENT IN AN ORGANIZED HEALTH CARE EDUCATION/TRAINING PROGRAM

## 2020-10-26 PROCEDURE — 99999 PR PBB SHADOW E&M-EST. PATIENT-LVL I: ICD-10-PCS | Mod: PBBFAC,,, | Performed by: STUDENT IN AN ORGANIZED HEALTH CARE EDUCATION/TRAINING PROGRAM
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
## 2020-10-27 ENCOUNTER — PATIENT MESSAGE (OUTPATIENT)
Dept: PSYCHIATRY | Facility: CLINIC | Age: 20
End: 2020-10-27

## 2020-10-27 NOTE — PATIENT INSTRUCTIONS
Please complete 1 thought tracking entry per day. Be sure to write at least 1 entry about the trauma.

## 2020-11-10 ENCOUNTER — PATIENT MESSAGE (OUTPATIENT)
Dept: PSYCHIATRY | Facility: CLINIC | Age: 20
End: 2020-11-10

## 2020-12-08 ENCOUNTER — PATIENT MESSAGE (OUTPATIENT)
Dept: PSYCHIATRY | Facility: CLINIC | Age: 20
End: 2020-12-08

## 2020-12-08 DIAGNOSIS — F41.9 ANXIETY: ICD-10-CM

## 2020-12-08 DIAGNOSIS — F32.A DEPRESSION, UNSPECIFIED DEPRESSION TYPE: ICD-10-CM

## 2020-12-08 RX ORDER — DULOXETIN HYDROCHLORIDE 60 MG/1
60 CAPSULE, DELAYED RELEASE ORAL DAILY
Qty: 30 CAPSULE | Refills: 0 | Status: SHIPPED | OUTPATIENT
Start: 2020-12-08 | End: 2021-01-07 | Stop reason: SDUPTHER

## 2021-01-07 ENCOUNTER — OFFICE VISIT (OUTPATIENT)
Dept: PSYCHIATRY | Facility: CLINIC | Age: 21
End: 2021-01-07
Payer: COMMERCIAL

## 2021-01-07 VITALS — HEART RATE: 106 BPM | DIASTOLIC BLOOD PRESSURE: 58 MMHG | SYSTOLIC BLOOD PRESSURE: 132 MMHG

## 2021-01-07 DIAGNOSIS — F32.A DEPRESSION, UNSPECIFIED DEPRESSION TYPE: ICD-10-CM

## 2021-01-07 DIAGNOSIS — F41.9 ANXIETY: ICD-10-CM

## 2021-01-07 DIAGNOSIS — F34.0 CYCLOTHYMIC DISORDER: ICD-10-CM

## 2021-01-07 PROCEDURE — 99214 PR OFFICE/OUTPT VISIT, EST, LEVL IV, 30-39 MIN: ICD-10-PCS | Mod: S$GLB,,, | Performed by: NURSE PRACTITIONER

## 2021-01-07 PROCEDURE — 99999 PR PBB SHADOW E&M-EST. PATIENT-LVL III: ICD-10-PCS | Mod: PBBFAC,,, | Performed by: NURSE PRACTITIONER

## 2021-01-07 PROCEDURE — 99214 OFFICE O/P EST MOD 30 MIN: CPT | Mod: S$GLB,,, | Performed by: NURSE PRACTITIONER

## 2021-01-07 PROCEDURE — 99999 PR PBB SHADOW E&M-EST. PATIENT-LVL III: CPT | Mod: PBBFAC,,, | Performed by: NURSE PRACTITIONER

## 2021-01-07 PROCEDURE — 99213 OFFICE O/P EST LOW 20 MIN: CPT | Mod: PBBFAC | Performed by: NURSE PRACTITIONER

## 2021-01-07 RX ORDER — DULOXETIN HYDROCHLORIDE 60 MG/1
60 CAPSULE, DELAYED RELEASE ORAL DAILY
Qty: 30 CAPSULE | Refills: 1 | Status: SHIPPED | OUTPATIENT
Start: 2021-01-07 | End: 2021-03-10 | Stop reason: SDUPTHER

## 2021-01-07 RX ORDER — BUSPIRONE HYDROCHLORIDE 7.5 MG/1
TABLET ORAL
Qty: 60 TABLET | Refills: 1 | Status: SHIPPED | OUTPATIENT
Start: 2021-01-07 | End: 2021-03-10 | Stop reason: DRUGHIGH

## 2021-01-07 RX ORDER — ARIPIPRAZOLE 15 MG/1
15 TABLET ORAL DAILY
Qty: 30 TABLET | Refills: 1 | Status: SHIPPED | OUTPATIENT
Start: 2021-01-07 | End: 2021-03-10 | Stop reason: SDUPTHER

## 2021-01-07 RX ORDER — HYDROXYZINE PAMOATE 25 MG/1
25 CAPSULE ORAL EVERY 6 HOURS PRN
Qty: 60 CAPSULE | Refills: 0 | Status: SHIPPED | OUTPATIENT
Start: 2021-01-07 | End: 2022-01-13 | Stop reason: SDUPTHER

## 2021-01-15 ENCOUNTER — OFFICE VISIT (OUTPATIENT)
Dept: URGENT CARE | Facility: CLINIC | Age: 21
End: 2021-01-15
Payer: COMMERCIAL

## 2021-01-15 VITALS — HEIGHT: 62 IN | WEIGHT: 117.75 LBS | BODY MASS INDEX: 21.67 KG/M2

## 2021-01-22 ENCOUNTER — OFFICE VISIT (OUTPATIENT)
Dept: URGENT CARE | Facility: CLINIC | Age: 21
End: 2021-01-22
Payer: COMMERCIAL

## 2021-01-22 VITALS
HEIGHT: 61 IN | HEART RATE: 71 BPM | SYSTOLIC BLOOD PRESSURE: 138 MMHG | OXYGEN SATURATION: 99 % | WEIGHT: 120 LBS | BODY MASS INDEX: 22.66 KG/M2 | RESPIRATION RATE: 16 BRPM | DIASTOLIC BLOOD PRESSURE: 82 MMHG | TEMPERATURE: 101 F

## 2021-01-22 DIAGNOSIS — J02.9 PHARYNGITIS, UNSPECIFIED ETIOLOGY: Primary | ICD-10-CM

## 2021-01-22 LAB
CTP QC/QA: YES
HETEROPH AB SER QL: NEGATIVE
S PYO RRNA THROAT QL PROBE: NEGATIVE
SARS-COV-2 RDRP RESP QL NAA+PROBE: NEGATIVE

## 2021-01-22 PROCEDURE — 99214 OFFICE O/P EST MOD 30 MIN: CPT | Mod: 25,CR,S$GLB, | Performed by: PHYSICIAN ASSISTANT

## 2021-01-22 PROCEDURE — 86308 POCT INFECTIOUS MONONUCLEOSIS: ICD-10-PCS | Mod: QW,S$GLB,, | Performed by: PHYSICIAN ASSISTANT

## 2021-01-22 PROCEDURE — 87880 STREP A ASSAY W/OPTIC: CPT | Mod: QW,S$GLB,, | Performed by: PHYSICIAN ASSISTANT

## 2021-01-22 PROCEDURE — 87880 POCT RAPID STREP A: ICD-10-PCS | Mod: QW,S$GLB,, | Performed by: PHYSICIAN ASSISTANT

## 2021-01-22 PROCEDURE — 86308 HETEROPHILE ANTIBODY SCREEN: CPT | Mod: QW,S$GLB,, | Performed by: PHYSICIAN ASSISTANT

## 2021-01-22 PROCEDURE — U0002: ICD-10-PCS | Mod: QW,S$GLB,, | Performed by: PHYSICIAN ASSISTANT

## 2021-01-22 PROCEDURE — 99214 PR OFFICE/OUTPT VISIT, EST, LEVL IV, 30-39 MIN: ICD-10-PCS | Mod: 25,CR,S$GLB, | Performed by: PHYSICIAN ASSISTANT

## 2021-01-22 PROCEDURE — U0002 COVID-19 LAB TEST NON-CDC: HCPCS | Mod: QW,S$GLB,, | Performed by: PHYSICIAN ASSISTANT

## 2021-01-22 RX ORDER — AMOXICILLIN 500 MG/1
500 CAPSULE ORAL EVERY 12 HOURS
Qty: 20 CAPSULE | Refills: 0 | Status: SHIPPED | OUTPATIENT
Start: 2021-01-22 | End: 2021-02-01

## 2021-01-22 RX ORDER — ACETAMINOPHEN 500 MG
1000 TABLET ORAL
Status: COMPLETED | OUTPATIENT
Start: 2021-01-22 | End: 2021-01-22

## 2021-01-22 RX ADMIN — Medication 1000 MG: at 07:01

## 2021-01-24 ENCOUNTER — CLINICAL SUPPORT (OUTPATIENT)
Dept: URGENT CARE | Facility: CLINIC | Age: 21
End: 2021-01-24
Payer: COMMERCIAL

## 2021-01-24 VITALS
BODY MASS INDEX: 22.64 KG/M2 | TEMPERATURE: 98 F | DIASTOLIC BLOOD PRESSURE: 68 MMHG | RESPIRATION RATE: 16 BRPM | OXYGEN SATURATION: 98 % | HEART RATE: 123 BPM | WEIGHT: 119.94 LBS | SYSTOLIC BLOOD PRESSURE: 107 MMHG | HEIGHT: 61 IN

## 2021-01-24 DIAGNOSIS — K12.0 ORAL APHTHOUS ULCER: ICD-10-CM

## 2021-01-24 DIAGNOSIS — R00.0 TACHYCARDIA: ICD-10-CM

## 2021-01-24 DIAGNOSIS — J02.9 PHARYNGITIS, UNSPECIFIED ETIOLOGY: Primary | ICD-10-CM

## 2021-01-24 DIAGNOSIS — Z11.59 SCREENING FOR VIRAL DISEASE: Primary | ICD-10-CM

## 2021-01-24 LAB
CTP QC/QA: YES
HETEROPH AB SER QL: NEGATIVE

## 2021-01-24 PROCEDURE — 86308 POCT INFECTIOUS MONONUCLEOSIS: ICD-10-PCS | Mod: QW,S$GLB,, | Performed by: PHYSICIAN ASSISTANT

## 2021-01-24 PROCEDURE — 99214 OFFICE O/P EST MOD 30 MIN: CPT | Mod: S$GLB,,, | Performed by: PHYSICIAN ASSISTANT

## 2021-01-24 PROCEDURE — 86308 HETEROPHILE ANTIBODY SCREEN: CPT | Mod: QW,S$GLB,, | Performed by: PHYSICIAN ASSISTANT

## 2021-01-24 PROCEDURE — 99214 PR OFFICE/OUTPT VISIT, EST, LEVL IV, 30-39 MIN: ICD-10-PCS | Mod: S$GLB,,, | Performed by: PHYSICIAN ASSISTANT

## 2021-01-24 RX ORDER — LIDOCAINE HYDROCHLORIDE 20 MG/ML
SOLUTION ORAL; TOPICAL
COMMUNITY
Start: 2021-01-23 | End: 2021-03-10

## 2021-01-24 RX ORDER — BETAMETHASONE SODIUM PHOSPHATE AND BETAMETHASONE ACETATE 3; 3 MG/ML; MG/ML
6 INJECTION, SUSPENSION INTRA-ARTICULAR; INTRALESIONAL; INTRAMUSCULAR; SOFT TISSUE
Status: COMPLETED | OUTPATIENT
Start: 2021-01-24 | End: 2021-01-24

## 2021-01-24 RX ORDER — LIDOCAINE HYDROCHLORIDE 20 MG/ML
JELLY TOPICAL 3 TIMES DAILY PRN
Qty: 30 ML | Refills: 0 | Status: SHIPPED | OUTPATIENT
Start: 2021-01-24 | End: 2021-02-07

## 2021-01-24 RX ADMIN — BETAMETHASONE SODIUM PHOSPHATE AND BETAMETHASONE ACETATE 6 MG: 3; 3 INJECTION, SUSPENSION INTRA-ARTICULAR; INTRALESIONAL; INTRAMUSCULAR; SOFT TISSUE at 10:01

## 2021-03-10 ENCOUNTER — OFFICE VISIT (OUTPATIENT)
Dept: PSYCHIATRY | Facility: CLINIC | Age: 21
End: 2021-03-10
Payer: MEDICAID

## 2021-03-10 VITALS
HEIGHT: 62 IN | WEIGHT: 128 LBS | BODY MASS INDEX: 23.55 KG/M2 | HEART RATE: 87 BPM | SYSTOLIC BLOOD PRESSURE: 113 MMHG | DIASTOLIC BLOOD PRESSURE: 58 MMHG

## 2021-03-10 DIAGNOSIS — F34.0 CYCLOTHYMIC DISORDER: Primary | ICD-10-CM

## 2021-03-10 DIAGNOSIS — F41.9 ANXIETY: ICD-10-CM

## 2021-03-10 DIAGNOSIS — F32.A DEPRESSION, UNSPECIFIED DEPRESSION TYPE: ICD-10-CM

## 2021-03-10 DIAGNOSIS — F19.10 POLYSUBSTANCE ABUSE: ICD-10-CM

## 2021-03-10 PROCEDURE — 99999 PR PBB SHADOW E&M-EST. PATIENT-LVL III: ICD-10-PCS | Mod: PBBFAC,,, | Performed by: NURSE PRACTITIONER

## 2021-03-10 PROCEDURE — 99999 PR PBB SHADOW E&M-EST. PATIENT-LVL III: CPT | Mod: PBBFAC,,, | Performed by: NURSE PRACTITIONER

## 2021-03-10 PROCEDURE — 99213 OFFICE O/P EST LOW 20 MIN: CPT | Mod: PBBFAC | Performed by: NURSE PRACTITIONER

## 2021-03-10 PROCEDURE — 99214 PR OFFICE/OUTPT VISIT, EST, LEVL IV, 30-39 MIN: ICD-10-PCS | Mod: S$GLB,,, | Performed by: NURSE PRACTITIONER

## 2021-03-10 PROCEDURE — 99214 OFFICE O/P EST MOD 30 MIN: CPT | Mod: S$GLB,,, | Performed by: NURSE PRACTITIONER

## 2021-03-10 RX ORDER — BUSPIRONE HYDROCHLORIDE 30 MG/1
30 TABLET ORAL 2 TIMES DAILY
Qty: 60 TABLET | Refills: 2 | Status: SHIPPED | OUTPATIENT
Start: 2021-03-10 | End: 2021-11-05 | Stop reason: SDUPTHER

## 2021-03-10 RX ORDER — ARIPIPRAZOLE 15 MG/1
15 TABLET ORAL DAILY
Qty: 30 TABLET | Refills: 1 | Status: SHIPPED | OUTPATIENT
Start: 2021-03-10 | End: 2021-11-05 | Stop reason: SDUPTHER

## 2021-03-10 RX ORDER — DULOXETIN HYDROCHLORIDE 60 MG/1
60 CAPSULE, DELAYED RELEASE ORAL DAILY
Qty: 30 CAPSULE | Refills: 1 | Status: SHIPPED | OUTPATIENT
Start: 2021-03-10 | End: 2021-11-05 | Stop reason: SDUPTHER

## 2021-03-28 ENCOUNTER — IMMUNIZATION (OUTPATIENT)
Dept: INTERNAL MEDICINE | Facility: CLINIC | Age: 21
End: 2021-03-28
Payer: MEDICAID

## 2021-03-28 DIAGNOSIS — Z23 NEED FOR VACCINATION: Primary | ICD-10-CM

## 2021-03-28 PROCEDURE — 0011A COVID-19, MRNA, LNP-S, PF, 100 MCG/0.5 ML DOSE VACCINE: ICD-10-PCS | Mod: CV19,,, | Performed by: FAMILY MEDICINE

## 2021-03-28 PROCEDURE — 0011A COVID-19, MRNA, LNP-S, PF, 100 MCG/0.5 ML DOSE VACCINE: CPT | Mod: CV19,,, | Performed by: FAMILY MEDICINE

## 2021-03-28 PROCEDURE — 91301 COVID-19, MRNA, LNP-S, PF, 100 MCG/0.5 ML DOSE VACCINE: ICD-10-PCS | Mod: ,,, | Performed by: FAMILY MEDICINE

## 2021-03-28 PROCEDURE — 91301 COVID-19, MRNA, LNP-S, PF, 100 MCG/0.5 ML DOSE VACCINE: CPT | Mod: ,,, | Performed by: FAMILY MEDICINE

## 2021-04-25 ENCOUNTER — IMMUNIZATION (OUTPATIENT)
Dept: INTERNAL MEDICINE | Facility: CLINIC | Age: 21
End: 2021-04-25
Payer: MEDICAID

## 2021-04-25 DIAGNOSIS — Z23 NEED FOR VACCINATION: Primary | ICD-10-CM

## 2021-04-25 PROCEDURE — 91301 COVID-19, MRNA, LNP-S, PF, 100 MCG/0.5 ML DOSE VACCINE: ICD-10-PCS | Mod: ,,, | Performed by: FAMILY MEDICINE

## 2021-04-25 PROCEDURE — 0012A COVID-19, MRNA, LNP-S, PF, 100 MCG/0.5 ML DOSE VACCINE: ICD-10-PCS | Mod: CV19,,, | Performed by: FAMILY MEDICINE

## 2021-04-25 PROCEDURE — 91301 COVID-19, MRNA, LNP-S, PF, 100 MCG/0.5 ML DOSE VACCINE: CPT | Mod: ,,, | Performed by: FAMILY MEDICINE

## 2021-04-25 PROCEDURE — 0012A COVID-19, MRNA, LNP-S, PF, 100 MCG/0.5 ML DOSE VACCINE: CPT | Mod: CV19,,, | Performed by: FAMILY MEDICINE

## 2021-05-01 ENCOUNTER — HOSPITAL ENCOUNTER (EMERGENCY)
Facility: OTHER | Age: 21
Discharge: HOME | End: 2021-05-01
Attending: EMERGENCY MEDICINE
Payer: MEDICAID

## 2021-05-01 ENCOUNTER — OFFICE VISIT (OUTPATIENT)
Dept: URGENT CARE | Facility: CLINIC | Age: 21
End: 2021-05-01
Payer: COMMERCIAL

## 2021-05-01 VITALS
RESPIRATION RATE: 16 BRPM | WEIGHT: 127 LBS | HEART RATE: 70 BPM | SYSTOLIC BLOOD PRESSURE: 121 MMHG | DIASTOLIC BLOOD PRESSURE: 64 MMHG | OXYGEN SATURATION: 98 % | HEIGHT: 62 IN | BODY MASS INDEX: 23.37 KG/M2 | TEMPERATURE: 98 F

## 2021-05-01 VITALS
DIASTOLIC BLOOD PRESSURE: 53 MMHG | HEIGHT: 62 IN | BODY MASS INDEX: 23.37 KG/M2 | RESPIRATION RATE: 16 BRPM | TEMPERATURE: 98 F | OXYGEN SATURATION: 99 % | SYSTOLIC BLOOD PRESSURE: 105 MMHG | HEART RATE: 60 BPM | WEIGHT: 127 LBS

## 2021-05-01 DIAGNOSIS — N93.9 VAGINAL BLEEDING: Primary | ICD-10-CM

## 2021-05-01 DIAGNOSIS — O20.0 THREATENED MISCARRIAGE IN EARLY PREGNANCY: ICD-10-CM

## 2021-05-01 DIAGNOSIS — Z3A.01 LESS THAN 8 WEEKS GESTATION OF PREGNANCY: ICD-10-CM

## 2021-05-01 LAB
ABO + RH BLD: NORMAL
ALBUMIN SERPL BCP-MCNC: 4.2 G/DL (ref 3.5–5.2)
ALP SERPL-CCNC: 72 U/L (ref 55–135)
ALT SERPL W/O P-5'-P-CCNC: 15 U/L (ref 10–44)
ANION GAP SERPL CALC-SCNC: 12 MMOL/L (ref 8–16)
AST SERPL-CCNC: 12 U/L (ref 10–40)
B-HCG UR QL: NEGATIVE
BASOPHILS # BLD AUTO: 0.06 K/UL (ref 0–0.2)
BASOPHILS NFR BLD: 0.8 % (ref 0–1.9)
BILIRUB SERPL-MCNC: 0.4 MG/DL (ref 0.1–1)
BUN SERPL-MCNC: 8 MG/DL (ref 6–20)
CALCIUM SERPL-MCNC: 9.3 MG/DL (ref 8.7–10.5)
CHLORIDE SERPL-SCNC: 107 MMOL/L (ref 95–110)
CO2 SERPL-SCNC: 20 MMOL/L (ref 23–29)
CREAT SERPL-MCNC: 0.7 MG/DL (ref 0.5–1.4)
CTP QC/QA: YES
DIFFERENTIAL METHOD: ABNORMAL
EOSINOPHIL # BLD AUTO: 0.6 K/UL (ref 0–0.5)
EOSINOPHIL NFR BLD: 8.1 % (ref 0–8)
ERYTHROCYTE [DISTWIDTH] IN BLOOD BY AUTOMATED COUNT: 13.5 % (ref 11.5–14.5)
EST. GFR  (AFRICAN AMERICAN): >60 ML/MIN/1.73 M^2
EST. GFR  (NON AFRICAN AMERICAN): >60 ML/MIN/1.73 M^2
GLUCOSE SERPL-MCNC: 87 MG/DL (ref 70–110)
HCG INTACT+B SERPL-ACNC: 3.6 MIU/ML
HCT VFR BLD AUTO: 36.9 % (ref 37–48.5)
HCV AB SERPL QL IA: NEGATIVE
HGB BLD-MCNC: 12 G/DL (ref 12–16)
HIV 1+2 AB+HIV1 P24 AG SERPL QL IA: NEGATIVE
IMM GRANULOCYTES # BLD AUTO: 0.02 K/UL (ref 0–0.04)
IMM GRANULOCYTES NFR BLD AUTO: 0.3 % (ref 0–0.5)
LYMPHOCYTES # BLD AUTO: 1.8 K/UL (ref 1–4.8)
LYMPHOCYTES NFR BLD: 23.4 % (ref 18–48)
MCH RBC QN AUTO: 27.9 PG (ref 27–31)
MCHC RBC AUTO-ENTMCNC: 32.5 G/DL (ref 32–36)
MCV RBC AUTO: 86 FL (ref 82–98)
MONOCYTES # BLD AUTO: 0.5 K/UL (ref 0.3–1)
MONOCYTES NFR BLD: 6.6 % (ref 4–15)
NEUTROPHILS # BLD AUTO: 4.6 K/UL (ref 1.8–7.7)
NEUTROPHILS NFR BLD: 60.8 % (ref 38–73)
NRBC BLD-RTO: 0 /100 WBC
PLATELET # BLD AUTO: 319 K/UL (ref 150–450)
PMV BLD AUTO: 9.8 FL (ref 9.2–12.9)
POTASSIUM SERPL-SCNC: 3.8 MMOL/L (ref 3.5–5.1)
PROT SERPL-MCNC: 7.6 G/DL (ref 6–8.4)
RBC # BLD AUTO: 4.3 M/UL (ref 4–5.4)
SODIUM SERPL-SCNC: 139 MMOL/L (ref 136–145)
WBC # BLD AUTO: 7.55 K/UL (ref 3.9–12.7)

## 2021-05-01 PROCEDURE — 84702 CHORIONIC GONADOTROPIN TEST: CPT | Performed by: EMERGENCY MEDICINE

## 2021-05-01 PROCEDURE — 99212 OFFICE O/P EST SF 10 MIN: CPT | Mod: S$GLB,,, | Performed by: NURSE PRACTITIONER

## 2021-05-01 PROCEDURE — 99212 PR OFFICE/OUTPT VISIT, EST, LEVL II, 10-19 MIN: ICD-10-PCS | Mod: S$GLB,,, | Performed by: NURSE PRACTITIONER

## 2021-05-01 PROCEDURE — 85025 COMPLETE CBC W/AUTO DIFF WBC: CPT | Performed by: EMERGENCY MEDICINE

## 2021-05-01 PROCEDURE — 80053 COMPREHEN METABOLIC PANEL: CPT | Performed by: EMERGENCY MEDICINE

## 2021-05-01 PROCEDURE — 86703 HIV-1/HIV-2 1 RESULT ANTBDY: CPT | Performed by: EMERGENCY MEDICINE

## 2021-05-01 PROCEDURE — 99284 EMERGENCY DEPT VISIT MOD MDM: CPT | Mod: 25

## 2021-05-01 PROCEDURE — 86803 HEPATITIS C AB TEST: CPT | Performed by: EMERGENCY MEDICINE

## 2021-05-01 PROCEDURE — 81025 URINE PREGNANCY TEST: CPT | Performed by: EMERGENCY MEDICINE

## 2021-05-01 PROCEDURE — 86900 BLOOD TYPING SEROLOGIC ABO: CPT | Performed by: EMERGENCY MEDICINE

## 2021-09-15 ENCOUNTER — HOSPITAL ENCOUNTER (EMERGENCY)
Facility: HOSPITAL | Age: 21
Discharge: PSYCHIATRIC OR SUBSTANCE USE FACILITY | End: 2021-09-15
Attending: EMERGENCY MEDICINE
Payer: MEDICAID

## 2021-09-15 VITALS
DIASTOLIC BLOOD PRESSURE: 54 MMHG | HEART RATE: 70 BPM | OXYGEN SATURATION: 98 % | TEMPERATURE: 99 F | WEIGHT: 115 LBS | HEIGHT: 62 IN | RESPIRATION RATE: 16 BRPM | SYSTOLIC BLOOD PRESSURE: 99 MMHG | BODY MASS INDEX: 21.16 KG/M2

## 2021-09-15 DIAGNOSIS — R45.851 SUICIDAL IDEATION: ICD-10-CM

## 2021-09-15 DIAGNOSIS — F30.10 MANIC BEHAVIOR: ICD-10-CM

## 2021-09-15 DIAGNOSIS — Z34.90 PREGNANCY, UNSPECIFIED GESTATIONAL AGE: Primary | ICD-10-CM

## 2021-09-15 LAB
ALBUMIN SERPL BCP-MCNC: 4.7 G/DL (ref 3.5–5.2)
ALP SERPL-CCNC: 66 U/L (ref 55–135)
ALT SERPL W/O P-5'-P-CCNC: 11 U/L (ref 10–44)
AMORPH CRY URNS QL MICRO: NORMAL
AMPHET+METHAMPHET UR QL: NEGATIVE
ANION GAP SERPL CALC-SCNC: 14 MMOL/L (ref 8–16)
APAP SERPL-MCNC: <3 UG/ML (ref 10–20)
AST SERPL-CCNC: 15 U/L (ref 10–40)
B-HCG UR QL: POSITIVE
BARBITURATES UR QL SCN>200 NG/ML: NEGATIVE
BASOPHILS # BLD AUTO: 0.03 K/UL (ref 0–0.2)
BASOPHILS NFR BLD: 0.4 % (ref 0–1.9)
BENZODIAZ UR QL SCN>200 NG/ML: NEGATIVE
BILIRUB SERPL-MCNC: 0.5 MG/DL (ref 0.1–1)
BILIRUB UR QL STRIP: NEGATIVE
BUN SERPL-MCNC: 4 MG/DL (ref 6–20)
BZE UR QL SCN: NEGATIVE
CALCIUM SERPL-MCNC: 9.9 MG/DL (ref 8.7–10.5)
CANNABINOIDS UR QL SCN: ABNORMAL
CHLORIDE SERPL-SCNC: 104 MMOL/L (ref 95–110)
CLARITY UR: ABNORMAL
CO2 SERPL-SCNC: 20 MMOL/L (ref 23–29)
COLOR UR: YELLOW
CREAT SERPL-MCNC: 0.7 MG/DL (ref 0.5–1.4)
CREAT UR-MCNC: 50.5 MG/DL (ref 15–325)
DIFFERENTIAL METHOD: ABNORMAL
EOSINOPHIL # BLD AUTO: 0.2 K/UL (ref 0–0.5)
EOSINOPHIL NFR BLD: 2.1 % (ref 0–8)
ERYTHROCYTE [DISTWIDTH] IN BLOOD BY AUTOMATED COUNT: 13.2 % (ref 11.5–14.5)
EST. GFR  (AFRICAN AMERICAN): >60 ML/MIN/1.73 M^2
EST. GFR  (NON AFRICAN AMERICAN): >60 ML/MIN/1.73 M^2
ETHANOL SERPL-MCNC: <10 MG/DL
GLUCOSE SERPL-MCNC: 87 MG/DL (ref 70–110)
GLUCOSE UR QL STRIP: NEGATIVE
HCT VFR BLD AUTO: 37 % (ref 37–48.5)
HGB BLD-MCNC: 12.7 G/DL (ref 12–16)
HGB UR QL STRIP: NEGATIVE
IMM GRANULOCYTES # BLD AUTO: 0.01 K/UL (ref 0–0.04)
IMM GRANULOCYTES NFR BLD AUTO: 0.1 % (ref 0–0.5)
KETONES UR QL STRIP: NEGATIVE
LEUKOCYTE ESTERASE UR QL STRIP: NEGATIVE
LYMPHOCYTES # BLD AUTO: 1.2 K/UL (ref 1–4.8)
LYMPHOCYTES NFR BLD: 14.6 % (ref 18–48)
MCH RBC QN AUTO: 29.3 PG (ref 27–31)
MCHC RBC AUTO-ENTMCNC: 34.3 G/DL (ref 32–36)
MCV RBC AUTO: 86 FL (ref 82–98)
METHADONE UR QL SCN>300 NG/ML: NEGATIVE
MICROSCOPIC COMMENT: NORMAL
MONOCYTES # BLD AUTO: 0.4 K/UL (ref 0.3–1)
MONOCYTES NFR BLD: 4.9 % (ref 4–15)
NEUTROPHILS # BLD AUTO: 6.4 K/UL (ref 1.8–7.7)
NEUTROPHILS NFR BLD: 77.9 % (ref 38–73)
NITRITE UR QL STRIP: NEGATIVE
NRBC BLD-RTO: 0 /100 WBC
OPIATES UR QL SCN: NEGATIVE
PCP UR QL SCN>25 NG/ML: NEGATIVE
PH UR STRIP: 8 [PH] (ref 5–8)
PLATELET # BLD AUTO: 275 K/UL (ref 150–450)
PMV BLD AUTO: 10.2 FL (ref 9.2–12.9)
POTASSIUM SERPL-SCNC: 3.7 MMOL/L (ref 3.5–5.1)
PROT SERPL-MCNC: 8.3 G/DL (ref 6–8.4)
PROT UR QL STRIP: NEGATIVE
RBC # BLD AUTO: 4.33 M/UL (ref 4–5.4)
RBC #/AREA URNS HPF: 0 /HPF (ref 0–4)
SARS-COV-2 RDRP RESP QL NAA+PROBE: NEGATIVE
SODIUM SERPL-SCNC: 138 MMOL/L (ref 136–145)
SP GR UR STRIP: 1.01 (ref 1–1.03)
TOXICOLOGY INFORMATION: ABNORMAL
TSH SERPL DL<=0.005 MIU/L-ACNC: 0.81 UIU/ML (ref 0.4–4)
URN SPEC COLLECT METH UR: ABNORMAL
UROBILINOGEN UR STRIP-ACNC: NEGATIVE EU/DL
WBC # BLD AUTO: 8.2 K/UL (ref 3.9–12.7)
WBC #/AREA URNS HPF: 0 /HPF (ref 0–5)

## 2021-09-15 PROCEDURE — 81000 URINALYSIS NONAUTO W/SCOPE: CPT | Mod: 59 | Performed by: EMERGENCY MEDICINE

## 2021-09-15 PROCEDURE — 84443 ASSAY THYROID STIM HORMONE: CPT | Performed by: EMERGENCY MEDICINE

## 2021-09-15 PROCEDURE — 80307 DRUG TEST PRSMV CHEM ANLYZR: CPT | Performed by: EMERGENCY MEDICINE

## 2021-09-15 PROCEDURE — U0002 COVID-19 LAB TEST NON-CDC: HCPCS | Performed by: EMERGENCY MEDICINE

## 2021-09-15 PROCEDURE — 80143 DRUG ASSAY ACETAMINOPHEN: CPT | Performed by: EMERGENCY MEDICINE

## 2021-09-15 PROCEDURE — 99205 OFFICE O/P NEW HI 60 MIN: CPT | Mod: 95,AF,HB, | Performed by: PSYCHIATRY & NEUROLOGY

## 2021-09-15 PROCEDURE — 99285 EMERGENCY DEPT VISIT HI MDM: CPT | Mod: 25

## 2021-09-15 PROCEDURE — 99205 PR OFFICE/OUTPT VISIT, NEW, LEVL V, 60-74 MIN: ICD-10-PCS | Mod: 95,AF,HB, | Performed by: PSYCHIATRY & NEUROLOGY

## 2021-09-15 PROCEDURE — 81025 URINE PREGNANCY TEST: CPT | Performed by: EMERGENCY MEDICINE

## 2021-09-15 PROCEDURE — 80053 COMPREHEN METABOLIC PANEL: CPT | Performed by: EMERGENCY MEDICINE

## 2021-09-15 PROCEDURE — 85025 COMPLETE CBC W/AUTO DIFF WBC: CPT | Performed by: EMERGENCY MEDICINE

## 2021-09-15 PROCEDURE — 82077 ASSAY SPEC XCP UR&BREATH IA: CPT | Performed by: EMERGENCY MEDICINE

## 2021-09-30 ENCOUNTER — HOSPITAL ENCOUNTER (EMERGENCY)
Facility: OTHER | Age: 21
Discharge: HOME | End: 2021-09-30
Attending: EMERGENCY MEDICINE
Payer: MEDICAID

## 2021-09-30 ENCOUNTER — PATIENT MESSAGE (OUTPATIENT)
Dept: PSYCHIATRY | Facility: CLINIC | Age: 21
End: 2021-09-30

## 2021-09-30 ENCOUNTER — OFFICE VISIT (OUTPATIENT)
Dept: PSYCHIATRY | Facility: CLINIC | Age: 21
End: 2021-09-30
Payer: MEDICAID

## 2021-09-30 VITALS
DIASTOLIC BLOOD PRESSURE: 57 MMHG | HEART RATE: 65 BPM | TEMPERATURE: 98 F | RESPIRATION RATE: 20 BRPM | SYSTOLIC BLOOD PRESSURE: 106 MMHG | OXYGEN SATURATION: 100 %

## 2021-09-30 DIAGNOSIS — F41.9 ANXIETY: ICD-10-CM

## 2021-09-30 DIAGNOSIS — D64.9 ANEMIA, UNSPECIFIED TYPE: ICD-10-CM

## 2021-09-30 DIAGNOSIS — F34.0 CYCLOTHYMIC DISORDER: Primary | ICD-10-CM

## 2021-09-30 DIAGNOSIS — F43.10 POSTTRAUMATIC STRESS DISORDER WITH DISSOCIATIVE SYMPTOMS: ICD-10-CM

## 2021-09-30 DIAGNOSIS — O20.0 THREATENED MISCARRIAGE IN EARLY PREGNANCY: Primary | ICD-10-CM

## 2021-09-30 DIAGNOSIS — F19.10 POLYSUBSTANCE ABUSE: ICD-10-CM

## 2021-09-30 DIAGNOSIS — F32.A DEPRESSION, UNSPECIFIED DEPRESSION TYPE: ICD-10-CM

## 2021-09-30 DIAGNOSIS — O20.9 VAGINAL BLEEDING BEFORE 22 WEEKS GESTATION: ICD-10-CM

## 2021-09-30 LAB
ABO + RH BLD: NORMAL
ANION GAP SERPL CALC-SCNC: 11 MMOL/L (ref 8–16)
B-HCG UR QL: POSITIVE
BASOPHILS # BLD AUTO: 0.03 K/UL (ref 0–0.2)
BASOPHILS NFR BLD: 0.3 % (ref 0–1.9)
BLD GP AB SCN CELLS X3 SERPL QL: NORMAL
BUN SERPL-MCNC: 6 MG/DL (ref 6–20)
CALCIUM SERPL-MCNC: 9.6 MG/DL (ref 8.7–10.5)
CHLORIDE SERPL-SCNC: 104 MMOL/L (ref 95–110)
CO2 SERPL-SCNC: 20 MMOL/L (ref 23–29)
CREAT SERPL-MCNC: 0.6 MG/DL (ref 0.5–1.4)
CTP QC/QA: YES
DIFFERENTIAL METHOD: ABNORMAL
EOSINOPHIL # BLD AUTO: 0.2 K/UL (ref 0–0.5)
EOSINOPHIL NFR BLD: 1.5 % (ref 0–8)
ERYTHROCYTE [DISTWIDTH] IN BLOOD BY AUTOMATED COUNT: 13.2 % (ref 11.5–14.5)
EST. GFR  (AFRICAN AMERICAN): >60 ML/MIN/1.73 M^2
EST. GFR  (NON AFRICAN AMERICAN): >60 ML/MIN/1.73 M^2
GLUCOSE SERPL-MCNC: 78 MG/DL (ref 70–110)
HCT VFR BLD AUTO: 32.9 % (ref 37–48.5)
HGB BLD-MCNC: 11.2 G/DL (ref 12–16)
IMM GRANULOCYTES # BLD AUTO: 0.04 K/UL (ref 0–0.04)
IMM GRANULOCYTES NFR BLD AUTO: 0.3 % (ref 0–0.5)
LYMPHOCYTES # BLD AUTO: 1.4 K/UL (ref 1–4.8)
LYMPHOCYTES NFR BLD: 11.9 % (ref 18–48)
MCH RBC QN AUTO: 29.8 PG (ref 27–31)
MCHC RBC AUTO-ENTMCNC: 34 G/DL (ref 32–36)
MCV RBC AUTO: 88 FL (ref 82–98)
MONOCYTES # BLD AUTO: 0.5 K/UL (ref 0.3–1)
MONOCYTES NFR BLD: 4.5 % (ref 4–15)
NEUTROPHILS # BLD AUTO: 9.7 K/UL (ref 1.8–7.7)
NEUTROPHILS NFR BLD: 81.5 % (ref 38–73)
NRBC BLD-RTO: 0 /100 WBC
PLATELET # BLD AUTO: 95 K/UL (ref 150–450)
PMV BLD AUTO: 11 FL (ref 9.2–12.9)
POTASSIUM SERPL-SCNC: 4.1 MMOL/L (ref 3.5–5.1)
RBC # BLD AUTO: 3.76 M/UL (ref 4–5.4)
SODIUM SERPL-SCNC: 135 MMOL/L (ref 136–145)
WBC # BLD AUTO: 11.87 K/UL (ref 3.9–12.7)

## 2021-09-30 PROCEDURE — 36000 PLACE NEEDLE IN VEIN: CPT

## 2021-09-30 PROCEDURE — 80048 BASIC METABOLIC PNL TOTAL CA: CPT | Performed by: EMERGENCY MEDICINE

## 2021-09-30 PROCEDURE — 85025 COMPLETE CBC W/AUTO DIFF WBC: CPT | Performed by: EMERGENCY MEDICINE

## 2021-09-30 PROCEDURE — 86900 BLOOD TYPING SEROLOGIC ABO: CPT | Performed by: EMERGENCY MEDICINE

## 2021-09-30 PROCEDURE — 90833 PSYTX W PT W E/M 30 MIN: CPT | Mod: SA,HB,95, | Performed by: NURSE PRACTITIONER

## 2021-09-30 PROCEDURE — 99283 EMERGENCY DEPT VISIT LOW MDM: CPT | Mod: 25

## 2021-09-30 PROCEDURE — 99214 OFFICE O/P EST MOD 30 MIN: CPT | Mod: SA,HB,95, | Performed by: NURSE PRACTITIONER

## 2021-09-30 PROCEDURE — 81025 URINE PREGNANCY TEST: CPT | Performed by: EMERGENCY MEDICINE

## 2021-09-30 PROCEDURE — 99214 PR OFFICE/OUTPT VISIT, EST, LEVL IV, 30-39 MIN: ICD-10-PCS | Mod: SA,HB,95, | Performed by: NURSE PRACTITIONER

## 2021-09-30 PROCEDURE — 90833 PR PSYCHOTHERAPY W/PATIENT W/E&M, 30 MIN (ADD ON): ICD-10-PCS | Mod: SA,HB,95, | Performed by: NURSE PRACTITIONER

## 2021-10-11 ENCOUNTER — OFFICE VISIT (OUTPATIENT)
Dept: PSYCHIATRY | Facility: CLINIC | Age: 21
End: 2021-10-11
Payer: MEDICAID

## 2021-10-11 DIAGNOSIS — F41.9 ANXIETY: ICD-10-CM

## 2021-10-11 DIAGNOSIS — F34.0 CYCLOTHYMIC DISORDER: Primary | ICD-10-CM

## 2021-10-11 PROCEDURE — 90834 PSYTX W PT 45 MINUTES: CPT | Mod: AF,HB,, | Performed by: STUDENT IN AN ORGANIZED HEALTH CARE EDUCATION/TRAINING PROGRAM

## 2021-10-11 PROCEDURE — 90834 PR PSYCHOTHERAPY W/PATIENT, 45 MIN: ICD-10-PCS | Mod: AF,HB,, | Performed by: STUDENT IN AN ORGANIZED HEALTH CARE EDUCATION/TRAINING PROGRAM

## 2021-11-04 ENCOUNTER — PATIENT MESSAGE (OUTPATIENT)
Dept: PSYCHIATRY | Facility: CLINIC | Age: 21
End: 2021-11-04
Payer: MEDICAID

## 2021-11-05 DIAGNOSIS — F34.0 CYCLOTHYMIC DISORDER: ICD-10-CM

## 2021-11-05 DIAGNOSIS — F41.9 ANXIETY: ICD-10-CM

## 2021-11-05 DIAGNOSIS — F32.A DEPRESSION, UNSPECIFIED DEPRESSION TYPE: ICD-10-CM

## 2021-11-05 RX ORDER — ARIPIPRAZOLE 15 MG/1
15 TABLET ORAL DAILY
Qty: 30 TABLET | Refills: 0 | Status: SHIPPED | OUTPATIENT
Start: 2021-11-05 | End: 2022-01-13 | Stop reason: SDUPTHER

## 2021-11-05 RX ORDER — BUSPIRONE HYDROCHLORIDE 30 MG/1
30 TABLET ORAL 2 TIMES DAILY
Qty: 60 TABLET | Refills: 0 | Status: SHIPPED | OUTPATIENT
Start: 2021-11-05 | End: 2022-01-13 | Stop reason: SDUPTHER

## 2021-11-05 RX ORDER — DULOXETIN HYDROCHLORIDE 60 MG/1
60 CAPSULE, DELAYED RELEASE ORAL DAILY
Qty: 30 CAPSULE | Refills: 0 | Status: SHIPPED | OUTPATIENT
Start: 2021-11-05 | End: 2022-01-13 | Stop reason: SDUPTHER

## 2021-11-17 ENCOUNTER — PATIENT MESSAGE (OUTPATIENT)
Dept: OBSTETRICS AND GYNECOLOGY | Facility: CLINIC | Age: 21
End: 2021-11-17
Payer: MEDICAID

## 2021-11-17 ENCOUNTER — LAB VISIT (OUTPATIENT)
Dept: LAB | Facility: OTHER | Age: 21
End: 2021-11-17
Attending: ADVANCED PRACTICE MIDWIFE
Payer: MEDICAID

## 2021-11-17 ENCOUNTER — PATIENT MESSAGE (OUTPATIENT)
Dept: OBSTETRICS AND GYNECOLOGY | Facility: CLINIC | Age: 21
End: 2021-11-17

## 2021-11-17 ENCOUNTER — OFFICE VISIT (OUTPATIENT)
Dept: OBSTETRICS AND GYNECOLOGY | Facility: CLINIC | Age: 21
End: 2021-11-17
Payer: MEDICAID

## 2021-11-17 VITALS
SYSTOLIC BLOOD PRESSURE: 112 MMHG | BODY MASS INDEX: 22.15 KG/M2 | DIASTOLIC BLOOD PRESSURE: 74 MMHG | WEIGHT: 120.38 LBS | HEIGHT: 62 IN

## 2021-11-17 DIAGNOSIS — Z30.09 BIRTH CONTROL COUNSELING: ICD-10-CM

## 2021-11-17 DIAGNOSIS — N93.9 COMPLAINT OF VAGINAL BLEEDING: ICD-10-CM

## 2021-11-17 DIAGNOSIS — N93.9 COMPLAINT OF VAGINAL BLEEDING: Primary | ICD-10-CM

## 2021-11-17 LAB
B-HCG UR QL: NEGATIVE
BASOPHILS # BLD AUTO: 0.06 K/UL (ref 0–0.2)
BASOPHILS NFR BLD: 0.9 % (ref 0–1.9)
CTP QC/QA: YES
DIFFERENTIAL METHOD: ABNORMAL
EOSINOPHIL # BLD AUTO: 0.4 K/UL (ref 0–0.5)
EOSINOPHIL NFR BLD: 6.2 % (ref 0–8)
ERYTHROCYTE [DISTWIDTH] IN BLOOD BY AUTOMATED COUNT: 13.2 % (ref 11.5–14.5)
HCG INTACT+B SERPL-ACNC: 5.2 MIU/ML
HCT VFR BLD AUTO: 37.1 % (ref 37–48.5)
HGB BLD-MCNC: 11.8 G/DL (ref 12–16)
IMM GRANULOCYTES # BLD AUTO: 0.01 K/UL (ref 0–0.04)
IMM GRANULOCYTES NFR BLD AUTO: 0.2 % (ref 0–0.5)
LYMPHOCYTES # BLD AUTO: 1.2 K/UL (ref 1–4.8)
LYMPHOCYTES NFR BLD: 18.7 % (ref 18–48)
MCH RBC QN AUTO: 29 PG (ref 27–31)
MCHC RBC AUTO-ENTMCNC: 31.8 G/DL (ref 32–36)
MCV RBC AUTO: 91 FL (ref 82–98)
MONOCYTES # BLD AUTO: 0.4 K/UL (ref 0.3–1)
MONOCYTES NFR BLD: 5.5 % (ref 4–15)
NEUTROPHILS # BLD AUTO: 4.4 K/UL (ref 1.8–7.7)
NEUTROPHILS NFR BLD: 68.5 % (ref 38–73)
NRBC BLD-RTO: 0 /100 WBC
PLATELET # BLD AUTO: 293 K/UL (ref 150–450)
PMV BLD AUTO: 10.8 FL (ref 9.2–12.9)
RBC # BLD AUTO: 4.07 M/UL (ref 4–5.4)
WBC # BLD AUTO: 6.41 K/UL (ref 3.9–12.7)

## 2021-11-17 PROCEDURE — 99999 PR PBB SHADOW E&M-EST. PATIENT-LVL III: CPT | Mod: PBBFAC,,, | Performed by: ADVANCED PRACTICE MIDWIFE

## 2021-11-17 PROCEDURE — 99214 OFFICE O/P EST MOD 30 MIN: CPT | Mod: S$PBB,,, | Performed by: ADVANCED PRACTICE MIDWIFE

## 2021-11-17 PROCEDURE — 99214 PR OFFICE/OUTPT VISIT, EST, LEVL IV, 30-39 MIN: ICD-10-PCS | Mod: S$PBB,,, | Performed by: ADVANCED PRACTICE MIDWIFE

## 2021-11-17 PROCEDURE — 99999 PR PBB SHADOW E&M-EST. PATIENT-LVL III: ICD-10-PCS | Mod: PBBFAC,,, | Performed by: ADVANCED PRACTICE MIDWIFE

## 2021-11-17 PROCEDURE — 36415 COLL VENOUS BLD VENIPUNCTURE: CPT | Performed by: ADVANCED PRACTICE MIDWIFE

## 2021-11-17 PROCEDURE — 99213 OFFICE O/P EST LOW 20 MIN: CPT | Mod: PBBFAC | Performed by: ADVANCED PRACTICE MIDWIFE

## 2021-11-17 PROCEDURE — 84702 CHORIONIC GONADOTROPIN TEST: CPT | Performed by: ADVANCED PRACTICE MIDWIFE

## 2021-11-17 PROCEDURE — 85025 COMPLETE CBC W/AUTO DIFF WBC: CPT | Performed by: ADVANCED PRACTICE MIDWIFE

## 2021-11-22 ENCOUNTER — OFFICE VISIT (OUTPATIENT)
Dept: URGENT CARE | Facility: CLINIC | Age: 21
End: 2021-11-22
Payer: MEDICAID

## 2021-11-22 VITALS
HEIGHT: 62 IN | SYSTOLIC BLOOD PRESSURE: 105 MMHG | HEART RATE: 92 BPM | OXYGEN SATURATION: 97 % | BODY MASS INDEX: 22.08 KG/M2 | TEMPERATURE: 98 F | RESPIRATION RATE: 16 BRPM | DIASTOLIC BLOOD PRESSURE: 66 MMHG | WEIGHT: 120 LBS

## 2021-11-22 DIAGNOSIS — M54.50 CHRONIC RIGHT-SIDED LOW BACK PAIN WITHOUT SCIATICA: Primary | ICD-10-CM

## 2021-11-22 DIAGNOSIS — G89.29 CHRONIC RIGHT-SIDED LOW BACK PAIN WITHOUT SCIATICA: Primary | ICD-10-CM

## 2021-11-22 PROCEDURE — 99213 PR OFFICE/OUTPT VISIT, EST, LEVL III, 20-29 MIN: ICD-10-PCS | Mod: S$GLB,,, | Performed by: PHYSICIAN ASSISTANT

## 2021-11-22 PROCEDURE — 99213 OFFICE O/P EST LOW 20 MIN: CPT | Mod: S$GLB,,, | Performed by: PHYSICIAN ASSISTANT

## 2021-11-22 RX ORDER — TIZANIDINE 4 MG/1
4 TABLET ORAL EVERY 8 HOURS
Qty: 30 TABLET | Refills: 0 | Status: SHIPPED | OUTPATIENT
Start: 2021-11-22 | End: 2021-12-02

## 2021-12-20 ENCOUNTER — TELEPHONE (OUTPATIENT)
Dept: OBSTETRICS AND GYNECOLOGY | Facility: CLINIC | Age: 21
End: 2021-12-20
Payer: MEDICAID

## 2021-12-30 ENCOUNTER — PATIENT MESSAGE (OUTPATIENT)
Dept: ADMINISTRATIVE | Facility: OTHER | Age: 21
End: 2021-12-30
Payer: MEDICAID

## 2021-12-30 ENCOUNTER — OFFICE VISIT (OUTPATIENT)
Dept: URGENT CARE | Facility: CLINIC | Age: 21
End: 2021-12-30
Payer: MEDICAID

## 2021-12-30 VITALS
RESPIRATION RATE: 18 BRPM | BODY MASS INDEX: 22.08 KG/M2 | WEIGHT: 120 LBS | HEART RATE: 88 BPM | TEMPERATURE: 98 F | HEIGHT: 62 IN | DIASTOLIC BLOOD PRESSURE: 84 MMHG | SYSTOLIC BLOOD PRESSURE: 128 MMHG | OXYGEN SATURATION: 98 %

## 2021-12-30 DIAGNOSIS — J03.90 EXUDATIVE TONSILLITIS: Primary | ICD-10-CM

## 2021-12-30 DIAGNOSIS — J02.9 SORE THROAT: ICD-10-CM

## 2021-12-30 LAB
CTP QC/QA: YES
HETEROPH AB SER QL: NEGATIVE
MOLECULAR STREP A: NEGATIVE
SARS-COV-2 RDRP RESP QL NAA+PROBE: NEGATIVE

## 2021-12-30 PROCEDURE — 3079F PR MOST RECENT DIASTOLIC BLOOD PRESSURE 80-89 MM HG: ICD-10-PCS | Mod: CPTII,S$GLB,, | Performed by: NURSE PRACTITIONER

## 2021-12-30 PROCEDURE — 99213 OFFICE O/P EST LOW 20 MIN: CPT | Mod: S$GLB,,, | Performed by: NURSE PRACTITIONER

## 2021-12-30 PROCEDURE — 87651 STREP A DNA AMP PROBE: CPT | Mod: QW,S$GLB,, | Performed by: NURSE PRACTITIONER

## 2021-12-30 PROCEDURE — 3008F BODY MASS INDEX DOCD: CPT | Mod: CPTII,S$GLB,, | Performed by: NURSE PRACTITIONER

## 2021-12-30 PROCEDURE — 1160F RVW MEDS BY RX/DR IN RCRD: CPT | Mod: CPTII,S$GLB,, | Performed by: NURSE PRACTITIONER

## 2021-12-30 PROCEDURE — 1159F MED LIST DOCD IN RCRD: CPT | Mod: CPTII,S$GLB,, | Performed by: NURSE PRACTITIONER

## 2021-12-30 PROCEDURE — 87651 POCT STREP A MOLECULAR: ICD-10-PCS | Mod: QW,S$GLB,, | Performed by: NURSE PRACTITIONER

## 2021-12-30 PROCEDURE — 3008F PR BODY MASS INDEX (BMI) DOCUMENTED: ICD-10-PCS | Mod: CPTII,S$GLB,, | Performed by: NURSE PRACTITIONER

## 2021-12-30 PROCEDURE — 86308 HETEROPHILE ANTIBODY SCREEN: CPT | Mod: QW,S$GLB,, | Performed by: NURSE PRACTITIONER

## 2021-12-30 PROCEDURE — 1159F PR MEDICATION LIST DOCUMENTED IN MEDICAL RECORD: ICD-10-PCS | Mod: CPTII,S$GLB,, | Performed by: NURSE PRACTITIONER

## 2021-12-30 PROCEDURE — 3074F PR MOST RECENT SYSTOLIC BLOOD PRESSURE < 130 MM HG: ICD-10-PCS | Mod: CPTII,S$GLB,, | Performed by: NURSE PRACTITIONER

## 2021-12-30 PROCEDURE — 99213 PR OFFICE/OUTPT VISIT, EST, LEVL III, 20-29 MIN: ICD-10-PCS | Mod: S$GLB,,, | Performed by: NURSE PRACTITIONER

## 2021-12-30 PROCEDURE — 1160F PR REVIEW ALL MEDS BY PRESCRIBER/CLIN PHARMACIST DOCUMENTED: ICD-10-PCS | Mod: CPTII,S$GLB,, | Performed by: NURSE PRACTITIONER

## 2021-12-30 PROCEDURE — 86308 POCT INFECTIOUS MONONUCLEOSIS: ICD-10-PCS | Mod: QW,S$GLB,, | Performed by: NURSE PRACTITIONER

## 2021-12-30 PROCEDURE — U0002 COVID-19 LAB TEST NON-CDC: HCPCS | Mod: QW,S$GLB,, | Performed by: NURSE PRACTITIONER

## 2021-12-30 PROCEDURE — 3079F DIAST BP 80-89 MM HG: CPT | Mod: CPTII,S$GLB,, | Performed by: NURSE PRACTITIONER

## 2021-12-30 PROCEDURE — 3074F SYST BP LT 130 MM HG: CPT | Mod: CPTII,S$GLB,, | Performed by: NURSE PRACTITIONER

## 2021-12-30 PROCEDURE — U0002: ICD-10-PCS | Mod: QW,S$GLB,, | Performed by: NURSE PRACTITIONER

## 2021-12-30 RX ORDER — AMOXICILLIN 500 MG/1
1000 CAPSULE ORAL DAILY
Qty: 20 CAPSULE | Refills: 0 | Status: SHIPPED | OUTPATIENT
Start: 2021-12-30 | End: 2022-01-09

## 2022-01-11 ENCOUNTER — TELEPHONE (OUTPATIENT)
Dept: OBSTETRICS AND GYNECOLOGY | Facility: CLINIC | Age: 22
End: 2022-01-11
Payer: MEDICAID

## 2022-01-11 NOTE — TELEPHONE ENCOUNTER
----- Message from Zeb Chen sent at 1/10/2022  2:56 PM CST -----  ARSENIO,PLEASE CALL PT SHE NEEDS TO SCHEDULE A APPT FOR HER NEXPLANON INSERTION [phone]

## 2022-01-13 ENCOUNTER — OFFICE VISIT (OUTPATIENT)
Dept: PSYCHIATRY | Facility: CLINIC | Age: 22
End: 2022-01-13
Payer: MEDICAID

## 2022-01-13 DIAGNOSIS — F41.9 ANXIETY: ICD-10-CM

## 2022-01-13 DIAGNOSIS — F43.10 POSTTRAUMATIC STRESS DISORDER WITH DISSOCIATIVE SYMPTOMS: ICD-10-CM

## 2022-01-13 DIAGNOSIS — F32.A DEPRESSION, UNSPECIFIED DEPRESSION TYPE: ICD-10-CM

## 2022-01-13 DIAGNOSIS — F34.0 CYCLOTHYMIC DISORDER: Primary | ICD-10-CM

## 2022-01-13 DIAGNOSIS — F19.10 POLYSUBSTANCE ABUSE: ICD-10-CM

## 2022-01-13 PROCEDURE — 1160F PR REVIEW ALL MEDS BY PRESCRIBER/CLIN PHARMACIST DOCUMENTED: ICD-10-PCS | Mod: SA,HB,CPTII,95 | Performed by: NURSE PRACTITIONER

## 2022-01-13 PROCEDURE — 1159F PR MEDICATION LIST DOCUMENTED IN MEDICAL RECORD: ICD-10-PCS | Mod: SA,HB,CPTII,95 | Performed by: NURSE PRACTITIONER

## 2022-01-13 PROCEDURE — 99214 PR OFFICE/OUTPT VISIT, EST, LEVL IV, 30-39 MIN: ICD-10-PCS | Mod: SA,HB,95, | Performed by: NURSE PRACTITIONER

## 2022-01-13 PROCEDURE — 1159F MED LIST DOCD IN RCRD: CPT | Mod: SA,HB,CPTII,95 | Performed by: NURSE PRACTITIONER

## 2022-01-13 PROCEDURE — 99214 OFFICE O/P EST MOD 30 MIN: CPT | Mod: SA,HB,95, | Performed by: NURSE PRACTITIONER

## 2022-01-13 PROCEDURE — 1160F RVW MEDS BY RX/DR IN RCRD: CPT | Mod: SA,HB,CPTII,95 | Performed by: NURSE PRACTITIONER

## 2022-01-13 RX ORDER — BUSPIRONE HYDROCHLORIDE 30 MG/1
30 TABLET ORAL 2 TIMES DAILY
Qty: 60 TABLET | Refills: 0 | Status: SHIPPED | OUTPATIENT
Start: 2022-01-13 | End: 2022-01-13 | Stop reason: SDUPTHER

## 2022-01-13 RX ORDER — HYDROXYZINE PAMOATE 25 MG/1
25 CAPSULE ORAL EVERY 6 HOURS PRN
Qty: 60 CAPSULE | Refills: 3 | Status: SHIPPED | OUTPATIENT
Start: 2022-01-13 | End: 2022-05-12

## 2022-01-13 RX ORDER — DULOXETIN HYDROCHLORIDE 60 MG/1
60 CAPSULE, DELAYED RELEASE ORAL DAILY
Qty: 30 CAPSULE | Refills: 3 | Status: SHIPPED | OUTPATIENT
Start: 2022-01-13 | End: 2022-05-12 | Stop reason: SDUPTHER

## 2022-01-13 RX ORDER — HYDROXYZINE PAMOATE 25 MG/1
25 CAPSULE ORAL EVERY 6 HOURS PRN
Qty: 60 CAPSULE | Refills: 0 | Status: SHIPPED | OUTPATIENT
Start: 2022-01-13 | End: 2022-01-13 | Stop reason: SDUPTHER

## 2022-01-13 RX ORDER — ARIPIPRAZOLE 15 MG/1
15 TABLET ORAL DAILY
Qty: 30 TABLET | Refills: 0 | Status: SHIPPED | OUTPATIENT
Start: 2022-01-13 | End: 2022-01-13 | Stop reason: SDUPTHER

## 2022-01-13 RX ORDER — TIZANIDINE 4 MG/1
4 TABLET ORAL 2 TIMES DAILY PRN
COMMUNITY
Start: 2021-12-08 | End: 2022-11-28

## 2022-01-13 RX ORDER — ARIPIPRAZOLE 15 MG/1
15 TABLET ORAL DAILY
Qty: 30 TABLET | Refills: 3 | Status: SHIPPED | OUTPATIENT
Start: 2022-01-13 | End: 2022-05-12

## 2022-01-13 RX ORDER — BUSPIRONE HYDROCHLORIDE 30 MG/1
30 TABLET ORAL 2 TIMES DAILY
Qty: 60 TABLET | Refills: 3 | Status: SHIPPED | OUTPATIENT
Start: 2022-01-13 | End: 2022-05-12 | Stop reason: SDUPTHER

## 2022-01-13 RX ORDER — DULOXETIN HYDROCHLORIDE 60 MG/1
60 CAPSULE, DELAYED RELEASE ORAL DAILY
Qty: 30 CAPSULE | Refills: 0 | Status: SHIPPED | OUTPATIENT
Start: 2022-01-13 | End: 2022-01-13 | Stop reason: SDUPTHER

## 2022-01-13 NOTE — PROGRESS NOTES
"Outpatient Psychiatry Follow-Up Visit (MD/NP) - Telemedicine Visit    1/13/2022 10:14 AM  Cydney Patel  2000  [id number]      The patient location is: Patient reported that her location at the time of this visit was in the [facility]. States she is in a parked car while she is waiting for her sister and mother who are in the store.       Visit type: audio only - Pt was able to connect via Internet but I could not due to technical difficulties with the [employer] platform.    Each patient to whom he or she provides medical services by telemedicine is:  (1) informed of the relationship between the physician and patient and the respective role of any other health care provider with respect to management of the patient; and (2) notified that he or she may decline to receive medical services by telemedicine and may withdraw from such care at any time.        Chief Complaint:  Cydney Patel, a 21 y.o. female,who presents today for follow up of   Chief Complaint   Patient presents with    Anxiety    Depression    Insomnia    Mood Swings   .  Met with patient.      Interim Events/Subjective Report/Content of Current Session:     Pt reports that she is "doing pretty good. Not great, but pretty good." She notes that her mother has made positive comments to her about the fact that she seems to be doing better. She attended the IOP at [facility] for a short while since her last visit. She did not like it because she was put in a group with older people who "were alcoholics". She prefers to schedule an appt for individual therapy. She was seeing Jairo Aguirre, PhD but reports that he doesn't have any upcoming appts available.     She has "mostly been consitant with my meds. I missed a few days, but mostly I've been taking them".    She has been staying away from substance because she reports that she know if she is around them, she will use them.     She reports that she feels less depressed, but has been " "hard on herself. She states "I feel like I have to prove to myself that I'm worth it". Overall, anxiety is better controlled. Sleeping well.     She reports that she has been researching her sxs and does not agree that she experiences zechariah. She believes that her sxs are more in line with Borderline Personality Disorder. She feels like her emotions are exteremly intense rather than manic.    Pt denies any SI/HI. Denies AVH, paranoia and delusions. No objective s/sx of psychosis or zechariah. Denies any ASE from meds. Denies any EPS/TD.      Psychotherapy:  · Target symptoms: substance abuse, mood disorder  · Why chosen therapy is appropriate versus another modality: relevant to diagnosis, patient responds to this modality  · Outcome monitoring methods: self-report, observation  · Therapeutic intervention type: supportive psychotherapy  · Topics discussed/themes: relationships difficulties, building skills sets for symptom management, symptom recognition, substance abuse  · The patient's response to the intervention is accepting. The patient's progress toward treatment goals is good.   · Duration of intervention: 10 minutes.      Psychotropic medication review  Previous Trials-  Prozac  Doxepin  Lexapro    Current meds-  Abilify  Vistaril  Cymbalta  Buspar      Review of Systems       Review of Systems   Constitutional: Negative for chills, fever, malaise/fatigue and weight loss.   Respiratory: Negative for shortness of breath and wheezing.    Cardiovascular: Negative for chest pain and palpitations.   Gastrointestinal: Negative for diarrhea and vomiting.   Musculoskeletal: Negative for falls and myalgias.   Skin: Negative for rash.   Neurological: Negative for tremors, seizures and headaches.   Endo/Heme/Allergies: Does not bruise/bleed easily.   Psychiatric/Behavioral:        See HPI       Past Medical, Family and Social History: The patient's past medical, family and social history have been reviewed and updated as " appropriate within the electronic medical record - see encounter notes.    Compliance: yes    Side effects: None    Risk Parameters:  Patient reports no suicidal ideation  Patient reports no homicidal ideation  Patient reports no self-injurious behavior  Patient reports no violent behavior    Exam (detailed: at least 9 elements; comprehensive: all 15 elements)   Constitutional  Vitals:  Most recent vital signs, dated less than 90 days prior to this appointment, were reviewed.   There were no vitals filed for this visit.     General:  CHAR due to audio visit     Musculoskeletal  Muscle Strength/Tone:  CHAR due to virtual visit   Gait & Station:  CHAR due to virtual visit     Psychiatric      Appearance:  CHAR due to audio visit   Behavior:  normal, friendly and cooperative     Speech:  no latency; no press, spontaneous   Mood & Affect:  euthymic  CHAR due to audio visit   Thought Process:  normal and logical   Associations:  intact   Thought Content:  normal, no suicidality, no homicidality, delusions, or paranoia   Insight:  intact   Judgement: behavior is adequate to circumstances, age appropriate   Orientation:  grossly intact   Memory: intact for content of interview   Language: grossly intact   Attention Span & Concentration:  able to focus   Fund of Knowledge:  intact and appropriate to age and level of education     Medications:  Outpatient Encounter Medications as of 1/13/2022   Medication Sig Dispense Refill    ARIPiprazole (ABILIFY) 15 MG Tab Take 1 tablet (15 mg total) by mouth once daily. 30 tablet 0    busPIRone (BUSPAR) 30 MG Tab Take 1 tablet (30 mg total) by mouth 2 (two) times daily. 60 tablet 0    DULoxetine (CYMBALTA) 60 MG capsule Take 1 capsule (60 mg total) by mouth once daily. 30 capsule 0    hydrOXYzine pamoate (VISTARIL) 25 MG Cap Take 1 capsule (25 mg total) by mouth every 6 (six) hours as needed (anxiety/insomnia). 60 capsule 0     No facility-administered encounter medications on file as of  1/13/2022.       Allergy:  Review of patient's allergies indicates:   Allergen Reactions    No known drug allergies          Assessment and Diagnosis   Status/Progress: Based on the examination today, the patient's problem(s) is/are improved and well controlled.  New problems have not been presented today.   Lack of compliance and Illicit drug use is complicating management of the primary condition.  The working differential for this patient includes Bipolar I or II d/o with rapid cycling; Drug induced mood d/o.       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 Anxiety  F41.9 300.00   3. Depression, unspecified depression type  F32.A 311   4. Polysubstance abuse  F19.10 305.90   5. Posttraumatic stress disorder with dissociative symptoms  F43.10 309.81       Intervention/Counseling/Treatment Plan     · Medication Management: The risks and benefits of medication were discussed with the patient.   · Continue Abilify 15 mg po q day  · Continue Cymbalta 60 mg po q day  · Continue Vistaril 25 mg q 6 hours prn anxiety/insomnia. Pt was told not drive or to take this med with ETOH or other sedating meds/substance. Pt verbalized understanding.   · Continue Buspar 30 mg BID  · Next AIMS due 9/10/2021 - unable to complete AIMS today due to virtual visit  · Refer for individual counseling  · Labs: reviewed most recent - pt was encouraged to have fasting labs drawn as previously ordered  I discussed with the patient the risks of extrapyramidal side effects (dystonia, akathisia, parkinsonism), metabolic syndrome (inlcuding hyperglycemia and hyperlipidemia), Neuroleptic Malignant Syndrome (fever, muscle rigidity, autonomic instability), orthostatic hypotension, and tardive dyskinesia with antipsychotic use. Pt was told to report these symptoms right away. Pt was notified that it is possible for these movements to become permanent. The risks and benefits of medication were discussed  with the patient. The patient expresses understanding of the above and displays the capacity to agree with this treatment given said understanding. Patient also agrees that, currently, the benefits outweigh the risks and would like to pursue treatment at this time.   Antidepressants: The pt was educated on the following: Antidepressant medication must be taken every day in order to see any improvement in symptoms. It takes 4-6 weeks to see the full therapeutic effect of the medication. The effects of the medication are very individualized and possible SE of the medication were discussed. Counseled on FDA black box warning of increased suicidality and contingency plans if such occurs. The patient expresses understanding of the above and displays the capacity to agree with this treatment given said understanding. Patient also agrees that, currently, the benefits outweigh the risks and would like to pursue treatment at this time.   · The treatment plan and follow up plan were reviewed with the patient.  · Discussed with patient informed consent, risks vs. benefits, alternative treatments, side effect profile and the inherent unpredictability of individual responses to these treatments. The patient expresses understanding of the above and displays the capacity to agree with this current plan and had no other questions.  · Encouraged Patient to keep future appointments.   · Take medications as prescribed and abstain from substance abuse.   · Present to ED or call 911 for SI/HI plan or intent, psychosis, or medical emergency.      Return to Clinic: 3 months, or sooner if needed        Face-to-face time with the patient:  26 minutes  Total time:  35 minutes of total time spent on the encounter, which includes face to face time and non-face to face time preparing to see the patient (eg, review of tests), Obtaining and/or reviewing separately obtained history, Documenting clinical information in the electronic or other  health record, Independently interpreting results (not separately reported) and communicating results to the patient/family/caregiver, or Care coordination (not separately reported).       Keysha Maldonado, MSN, APRN, PMHNP-BC  [facility]

## 2022-01-24 ENCOUNTER — OFFICE VISIT (OUTPATIENT)
Dept: FAMILY MEDICINE | Facility: HOSPITAL | Age: 22
End: 2022-01-24
Attending: SPECIALIST
Payer: MEDICAID

## 2022-01-24 ENCOUNTER — OFFICE VISIT (OUTPATIENT)
Dept: URGENT CARE | Facility: CLINIC | Age: 22
End: 2022-01-24
Payer: MEDICAID

## 2022-01-24 VITALS
HEIGHT: 62 IN | DIASTOLIC BLOOD PRESSURE: 63 MMHG | SYSTOLIC BLOOD PRESSURE: 99 MMHG | BODY MASS INDEX: 20.73 KG/M2 | HEART RATE: 56 BPM | WEIGHT: 112.63 LBS

## 2022-01-24 VITALS
HEART RATE: 77 BPM | HEIGHT: 62 IN | SYSTOLIC BLOOD PRESSURE: 149 MMHG | TEMPERATURE: 99 F | DIASTOLIC BLOOD PRESSURE: 74 MMHG | RESPIRATION RATE: 17 BRPM | WEIGHT: 112 LBS | BODY MASS INDEX: 20.61 KG/M2 | OXYGEN SATURATION: 97 %

## 2022-01-24 DIAGNOSIS — M79.18 CERVICAL MYOFASCIAL PAIN SYNDROME: ICD-10-CM

## 2022-01-24 DIAGNOSIS — U07.1 COVID-19: Primary | ICD-10-CM

## 2022-01-24 DIAGNOSIS — G89.29 CHRONIC BILATERAL LOW BACK PAIN WITH BILATERAL SCIATICA: Primary | ICD-10-CM

## 2022-01-24 DIAGNOSIS — M54.41 CHRONIC BILATERAL LOW BACK PAIN WITH BILATERAL SCIATICA: Primary | ICD-10-CM

## 2022-01-24 DIAGNOSIS — J02.9 SORE THROAT: ICD-10-CM

## 2022-01-24 DIAGNOSIS — M54.42 CHRONIC BILATERAL LOW BACK PAIN WITH BILATERAL SCIATICA: Primary | ICD-10-CM

## 2022-01-24 LAB
CTP QC/QA: YES
SARS-COV-2 RDRP RESP QL NAA+PROBE: POSITIVE

## 2022-01-24 PROCEDURE — 3077F SYST BP >= 140 MM HG: CPT | Mod: CPTII,S$GLB,, | Performed by: PHYSICIAN ASSISTANT

## 2022-01-24 PROCEDURE — 1160F PR REVIEW ALL MEDS BY PRESCRIBER/CLIN PHARMACIST DOCUMENTED: ICD-10-PCS | Mod: CPTII,S$GLB,, | Performed by: PHYSICIAN ASSISTANT

## 2022-01-24 PROCEDURE — 3008F PR BODY MASS INDEX (BMI) DOCUMENTED: ICD-10-PCS | Mod: CPTII,S$GLB,, | Performed by: PHYSICIAN ASSISTANT

## 2022-01-24 PROCEDURE — 1160F RVW MEDS BY RX/DR IN RCRD: CPT | Mod: CPTII,S$GLB,, | Performed by: PHYSICIAN ASSISTANT

## 2022-01-24 PROCEDURE — 3077F PR MOST RECENT SYSTOLIC BLOOD PRESSURE >= 140 MM HG: ICD-10-PCS | Mod: CPTII,S$GLB,, | Performed by: PHYSICIAN ASSISTANT

## 2022-01-24 PROCEDURE — U0002: ICD-10-PCS | Mod: QW,S$GLB,, | Performed by: PHYSICIAN ASSISTANT

## 2022-01-24 PROCEDURE — 99213 OFFICE O/P EST LOW 20 MIN: CPT | Performed by: STUDENT IN AN ORGANIZED HEALTH CARE EDUCATION/TRAINING PROGRAM

## 2022-01-24 PROCEDURE — 1159F PR MEDICATION LIST DOCUMENTED IN MEDICAL RECORD: ICD-10-PCS | Mod: CPTII,S$GLB,, | Performed by: PHYSICIAN ASSISTANT

## 2022-01-24 PROCEDURE — U0002 COVID-19 LAB TEST NON-CDC: HCPCS | Mod: QW,S$GLB,, | Performed by: PHYSICIAN ASSISTANT

## 2022-01-24 PROCEDURE — 3008F BODY MASS INDEX DOCD: CPT | Mod: CPTII,S$GLB,, | Performed by: PHYSICIAN ASSISTANT

## 2022-01-24 PROCEDURE — 1159F MED LIST DOCD IN RCRD: CPT | Mod: CPTII,S$GLB,, | Performed by: PHYSICIAN ASSISTANT

## 2022-01-24 PROCEDURE — 99213 PR OFFICE/OUTPT VISIT, EST, LEVL III, 20-29 MIN: ICD-10-PCS | Mod: S$GLB,,, | Performed by: PHYSICIAN ASSISTANT

## 2022-01-24 PROCEDURE — 3078F PR MOST RECENT DIASTOLIC BLOOD PRESSURE < 80 MM HG: ICD-10-PCS | Mod: CPTII,S$GLB,, | Performed by: PHYSICIAN ASSISTANT

## 2022-01-24 PROCEDURE — 99213 OFFICE O/P EST LOW 20 MIN: CPT | Mod: S$GLB,,, | Performed by: PHYSICIAN ASSISTANT

## 2022-01-24 PROCEDURE — 3078F DIAST BP <80 MM HG: CPT | Mod: CPTII,S$GLB,, | Performed by: PHYSICIAN ASSISTANT

## 2022-01-24 RX ORDER — GABAPENTIN 100 MG/1
100 CAPSULE ORAL NIGHTLY
Qty: 30 CAPSULE | Refills: 0 | Status: SHIPPED | OUTPATIENT
Start: 2022-01-24 | End: 2022-05-13 | Stop reason: SDUPTHER

## 2022-01-24 RX ORDER — GABAPENTIN 100 MG/1
100 CAPSULE ORAL NIGHTLY
Qty: 30 CAPSULE | Refills: 0 | Status: SHIPPED | OUTPATIENT
Start: 2022-01-24 | End: 2022-01-24

## 2022-01-24 NOTE — LETTER
[address] [address], [address] ? [place] [address] ? Phone [phone] ? Fax [phone]           Return to Work/School    Patient: Cydney Patel  YOB: 2000   Date: 01/24/2022      To Whom It May Concern:     Cydney Patel was in contact with/seen in my office on 01/24/2022. COVID-19 is present in our communities across the [facility]. Not all patients are eligible or appropriate to be tested. In this situation, your employee meets the following criteria:     Cydney Patel has met the criteria for COVID-19 testing and has a POSITIVE result. She can return to work once they are asymptomatic for 24 hours without the use of fever reducing medications AND at least five days from the start of symptoms (or from the first positive result if they have no symptoms).      If you have any questions or concerns, or if I can be of further assistance, please do not hesitate to contact me.     Sincerely,    LENNOX Pennington
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
## 2022-01-24 NOTE — PROGRESS NOTES
"Subjective:       Patient ID: Cydney Patel is a 21 y.o. female.    Vitals:  height is 5' 2" (1.575 m) and weight is 50.8 kg (112 lb). Her temperature is 99.2 °F (37.3 °C). Her blood pressure is 149/74 (abnormal) and her pulse is 77. Her respiration is 17 and oxygen saturation is 97%.     Chief Complaint: Sore Throat    Patient has been in close contact with a family member who tested positive for Covid. Patient begin experiencing symptoms about 2 days ago.  Symptoms: sore throat, headaches, sinus pressure, postnasal drip, cough, nausea. Treatment tried: Tylenol, with no relief. Patient is vaccinated.    Sore Throat   This is a new problem. The current episode started in the past 7 days. The problem has been gradually worsening. The pain is worse on the left side. There has been no fever. Associated symptoms include congestion, coughing and headaches. Pertinent negatives include no shortness of breath. Treatments tried: tylenol.       Constitution: Positive for appetite change. Negative for fever.   HENT: Positive for congestion, postnasal drip, sinus pressure and sore throat.    Respiratory: Positive for cough. Negative for shortness of breath.    Neurological: Positive for headaches.       Objective:      Physical Exam   Constitutional: She does not appear ill. No distress.   HENT:   Head: Normocephalic and atraumatic.   Ears:   Right Ear: External ear normal.   Left Ear: External ear normal.   Mouth/Throat: Mucous membranes are moist. No oropharyngeal exudate or posterior oropharyngeal erythema. Oropharynx is clear.   Eyes: Conjunctivae are normal. Right eye exhibits no discharge. Left eye exhibits no discharge.      extraocular movement intact   Cardiovascular: Normal rate, regular rhythm and normal heart sounds.   No murmur heard.  Pulmonary/Chest: Effort normal and breath sounds normal. She has no wheezes. She has no rhonchi. She has no rales.   Abdominal: Normal appearance.   Musculoskeletal: Normal " "range of motion.         General: Normal range of motion.   Neurological: no focal deficit. She is alert.   Skin: Skin is warm, dry and not pale. jaundice  Psychiatric: Her behavior is normal. Mood, judgment and thought content normal.   Nursing note and vitals reviewed.        Assessment:       1. COVID-19    2. Sore throat          Plan:         COVID-19    Sore throat  -     POCT COVID-19 Rapid Screening      Results for orders placed or performed in visit on 01/24/22   POCT COVID-19 Rapid Screening   Result Value Ref Range    POC Rapid COVID Positive (A) Negative     Acceptable Yes         Discussed at home symptomatic treatment.  Discussed warning signs/symptoms, reasons return to urgent care or see PCP for re-evaluation.  Patient states understanding and agrees with plan.         Patient Instructions   You have tested POSITIVE for COVID-19 today    Quarantine recommendations based on CDC guidelines:      No symptoms present: You must quarantine for 5 days starting on the day of the positive test.    Symptoms present: You must quarantine for 5 days starting on the day of symptom onset.      AFTER 5 days, if your symptoms have improved and you are fever free, you can return to the community on day 6.  The CDC recommends strict mask use for 5 days following quarantine end.      You cannot test negative to "test out" of quarantine. Per CDC recommendations we do not retest within 90 days of a positive test.      Please refer to CDC website for additional information      "

## 2022-02-04 ENCOUNTER — PATIENT MESSAGE (OUTPATIENT)
Dept: OBSTETRICS AND GYNECOLOGY | Facility: CLINIC | Age: 22
End: 2022-02-04
Payer: MEDICAID

## 2022-02-07 ENCOUNTER — PROCEDURE VISIT (OUTPATIENT)
Dept: OBSTETRICS AND GYNECOLOGY | Facility: CLINIC | Age: 22
End: 2022-02-07
Payer: MEDICAID

## 2022-02-07 VITALS
HEIGHT: 62 IN | DIASTOLIC BLOOD PRESSURE: 60 MMHG | SYSTOLIC BLOOD PRESSURE: 106 MMHG | WEIGHT: 116.38 LBS | BODY MASS INDEX: 21.42 KG/M2

## 2022-02-07 DIAGNOSIS — Z30.017 NEXPLANON INSERTION: ICD-10-CM

## 2022-02-07 DIAGNOSIS — Z01.419 ROUTINE GYNECOLOGICAL EXAMINATION: Primary | ICD-10-CM

## 2022-02-07 LAB
B-HCG UR QL: NEGATIVE
CTP QC/QA: YES

## 2022-02-07 PROCEDURE — 99395 PR PREVENTIVE VISIT,EST,18-39: ICD-10-PCS | Mod: 25,S$PBB,, | Performed by: OBSTETRICS & GYNECOLOGY

## 2022-02-07 PROCEDURE — 88175 CYTOPATH C/V AUTO FLUID REDO: CPT | Performed by: OBSTETRICS & GYNECOLOGY

## 2022-02-07 PROCEDURE — 11981 INSERTION OF NEXPLANON: ICD-10-PCS | Mod: S$PBB,,, | Performed by: OBSTETRICS & GYNECOLOGY

## 2022-02-07 PROCEDURE — 11981 INSERTION DRUG DLVR IMPLANT: CPT | Mod: PBBFAC | Performed by: OBSTETRICS & GYNECOLOGY

## 2022-02-07 PROCEDURE — 99395 PREV VISIT EST AGE 18-39: CPT | Mod: 25,S$PBB,, | Performed by: OBSTETRICS & GYNECOLOGY

## 2022-02-07 PROCEDURE — 81025 URINE PREGNANCY TEST: CPT | Mod: PBBFAC | Performed by: OBSTETRICS & GYNECOLOGY

## 2022-02-07 RX ADMIN — ETONOGESTREL 68 MG: 68 IMPLANT SUBCUTANEOUS at 02:02

## 2022-02-07 NOTE — PROCEDURES
Insertion of Nexplanon    Date/Time: 2/7/2022 2:15 PM  Performed by: Charan Barrett Jr., MD  Authorized by: Charan Barrett Jr., MD     Consent obtained:  Written  Consent given by:  Patient  Patient questions answered: yes    Patient agrees, verbalizes understanding, and wants to proceed: yes    Educational handouts given: yes    Instructions and paperwork completed: yes    Pre-procedure timeout performed: yes    Prepped with: povidone-iodine    Local anesthetic:  Lidocaine with epinephrine  The site was cleaned and prepped in a sterile fashion: yes    Small stab incision was made in arm: no    Left/right:  Left   68 mg etonogestrel 68 mg  Preloaded Implanon trocar was placed subdermally: yes    Visualization of implant was obtained: yes    Nexplanon was inserted and trocar removed: yes    Visualization of notch in stilette and palpitation of device: yes    Palpitation confirms placement by provider and patient: yes    Site was closed with steri-strips and pressure bandage applied: yes

## 2022-02-07 NOTE — PROGRESS NOTES
PT HERE FOR ANNUAL.  WANTS NEXPLANON. MIRENA PLACED PP FELL OUT.    ROS:  GENERAL: No fever, chills, fatigability or weight loss.  VULVAR: No pain, no lesions and no itching.  VAGINAL: No relaxation, no itching, no discharge, no abnormal bleeding and no lesions.  ABDOMEN: No abdominal pain. Denies nausea. Denies vomiting. No diarrhea. No constipation  BREAST: Denies pain. No lumps. No discharge.  URINARY: No incontinence, no nocturia, no frequency and no dysuria.  CARDIOVASCULAR: No chest pain. No shortness of breath. No leg cramps.  NEUROLOGICAL: No headaches. No vision changes.  The remainder of the review of systems was negative.    PE:  General Appearance: normal weight And Well developed. Well nourished. In no acute distress.  Vulva: Lesions: No.  Urethral Meatus: Normal size. Normal location. No lesions. No prolapse.  Urethra: No masses. No tenderness. No prolapse. No scarring.  Bladder: No masses. No tenderness.  Vagina: Mucosa NI:yes discharge no, atrophy no, cystocele no or rectocele no.  Cervix: Lesion: no  Stenotic: no Cervical motion tenderness: no  Uterus: Uterus size: 4 weeks. Support good. Uterus size: Normal  Adnexa: Masses: No Tenderness: No CDS Nodularity: No  Abdomen: normal weight No masses. No tenderness.  Breasts: No bilateral masses. No bilateral discharge. No bilateral tenderness. No bilateral fibrocystic changes.  Neck: No thyroid enlargement. No thyroid masses.  Skin: Rashes: No      PROCEDURES:    PLAN:     DIAGNOSIS:  1. Routine gynecological examination    2. Nexplanon insertion        MEDICATIONS & ORDERS:  Orders Placed This Encounter    Insertion of Nexplanon    POCT Urine Pregnancy    etonogestreL subdermal device 68 mg       Patient was counseled today on the new ACS guidelines for cervical cytology screening as well as the current recommendations for breast cancer screening. She was counseled to follow up with her PCP for other routine health maintenance. Counseling session  lasted approximately 10 minutes, and all her questions were answered.         FOLLOW-UP: With me in 12 month

## 2022-02-13 LAB
FINAL PATHOLOGIC DIAGNOSIS: NORMAL
Lab: NORMAL

## 2022-02-24 ENCOUNTER — OFFICE VISIT (OUTPATIENT)
Dept: PSYCHIATRY | Facility: CLINIC | Age: 22
End: 2022-02-24
Payer: MEDICAID

## 2022-02-24 DIAGNOSIS — F19.10 POLYSUBSTANCE ABUSE: ICD-10-CM

## 2022-02-24 DIAGNOSIS — F43.10 POSTTRAUMATIC STRESS DISORDER WITH DISSOCIATIVE SYMPTOMS: ICD-10-CM

## 2022-02-24 DIAGNOSIS — F41.9 ANXIETY: ICD-10-CM

## 2022-02-24 DIAGNOSIS — F32.A DEPRESSION, UNSPECIFIED DEPRESSION TYPE: ICD-10-CM

## 2022-02-24 DIAGNOSIS — F60.3 BORDERLINE PERSONALITY DISORDER: Primary | ICD-10-CM

## 2022-02-24 PROCEDURE — 1160F RVW MEDS BY RX/DR IN RCRD: CPT | Mod: SA,HB,CPTII,95 | Performed by: NURSE PRACTITIONER

## 2022-02-24 PROCEDURE — 1160F PR REVIEW ALL MEDS BY PRESCRIBER/CLIN PHARMACIST DOCUMENTED: ICD-10-PCS | Mod: SA,HB,CPTII,95 | Performed by: NURSE PRACTITIONER

## 2022-02-24 PROCEDURE — 99214 OFFICE O/P EST MOD 30 MIN: CPT | Mod: SA,HB,95, | Performed by: NURSE PRACTITIONER

## 2022-02-24 PROCEDURE — 90833 PR PSYCHOTHERAPY W/PATIENT W/E&M, 30 MIN (ADD ON): ICD-10-PCS | Mod: SA,HB,95, | Performed by: NURSE PRACTITIONER

## 2022-02-24 PROCEDURE — 90833 PSYTX W PT W E/M 30 MIN: CPT | Mod: SA,HB,95, | Performed by: NURSE PRACTITIONER

## 2022-02-24 PROCEDURE — 1159F MED LIST DOCD IN RCRD: CPT | Mod: SA,HB,CPTII,95 | Performed by: NURSE PRACTITIONER

## 2022-02-24 PROCEDURE — 99214 PR OFFICE/OUTPT VISIT, EST, LEVL IV, 30-39 MIN: ICD-10-PCS | Mod: SA,HB,95, | Performed by: NURSE PRACTITIONER

## 2022-02-24 PROCEDURE — 1159F PR MEDICATION LIST DOCUMENTED IN MEDICAL RECORD: ICD-10-PCS | Mod: SA,HB,CPTII,95 | Performed by: NURSE PRACTITIONER

## 2022-02-24 RX ORDER — LAMOTRIGINE 25 MG/1
TABLET ORAL
Qty: 42 TABLET | Refills: 0 | Status: SHIPPED | OUTPATIENT
Start: 2022-02-24 | End: 2022-05-12 | Stop reason: SDUPTHER

## 2022-02-24 NOTE — PROGRESS NOTES
"Outpatient Psychiatry Follow-Up Visit (MD/NP) - Telemedicine Visit    2/24/2022 10:14 AM  Cydney Patel  2000  [id number]      The patient location is: Patient reported that her location at the time of this visit was in the [facility]. States she is in a parked car while she is waiting for her sister and mother who are in the store.       Visit type: audiovisual    Each patient to whom he or she provides medical services by telemedicine is:  (1) informed of the relationship between the physician and patient and the respective role of any other health care provider with respect to management of the patient; and (2) notified that he or she may decline to receive medical services by telemedicine and may withdraw from such care at any time.        Chief Complaint:  Cydney Patel, a 21 y.o. female,who presents today for follow up of mood swings, depression, and anxiety.  Met with patient.      Interim Events/Subjective Report/Content of Current Session:     Pt reports that she is "doing pretty good." She states she stopped all of her psych meds for about 3-4 days because she was busy. She works 3 jobs. She noticed that she became more irritable so she got back on them and is doing well. States "I feel a lot better now". Mood "is definitely more stable. Very mellow. Just on the line." States she is still having episodes of explosive anger when things don't go her way. She denies harming herself or anyone else. States that she blacks out during these episodes and cannot recall what she did. The next thing she knows, she is crying. After these episodes, she usually becomes depressed for a couple of days. She is back with her child's father and states that she recently got angry with him and threw his cell phone. She reports "It's a very healthy relationship this time". She denies any DV.    Denies any recent substance use. Overall, anxiety is better controlled. Sleeping well. Appetite is good.    She " "reports that she has been researching her sxs and does not agree that she experiences zechariah. She believes that her sxs are more in line with Borderline Personality Disorder. She feels like her emotions are exteremly intense rather than manic. Her emotions shift rapidly. She reports that she can be "ok one minute and in a rage the next".     We discussed individual therapy. She states she still has the names of the providers that I gave her, but she has not yet called to set up an appt. States she will call today to schedule.    Pt denies any SI/HI. Denies AVH, paranoia and delusions. No objective s/sx of psychosis or zechariah. Denies any ASE from meds. Denies any EPS/TD.      Psychotherapy:  · Target symptoms: mood swings, mood disorder  · Why chosen therapy is appropriate versus another modality: relevant to diagnosis, patient responds to this modality  · Outcome monitoring methods: self-report, observation  · Therapeutic intervention type: supportive psychotherapy  · Topics discussed/themes: relationships difficulties, difficulty managing affect in interpersonal relationships, building skills sets for symptom management  · The patient's response to the intervention is accepting. The patient's progress toward treatment goals is good.   · Duration of intervention:17 minutes.      Psychotropic medication review  Previous Trials-  Prozac  Doxepin  Lexapro    Current meds-  Abilify  Vistaril  Cymbalta  Buspar      Review of Systems       Review of Systems   Constitutional: Negative for chills, fever, malaise/fatigue and weight loss.   Respiratory: Negative for shortness of breath and wheezing.    Cardiovascular: Negative for chest pain and palpitations.   Gastrointestinal: Negative for diarrhea and vomiting.   Musculoskeletal: Negative for falls and myalgias.   Skin: Negative for rash.   Neurological: Negative for tremors, seizures and headaches.   Endo/Heme/Allergies: Does not bruise/bleed easily. "   Psychiatric/Behavioral:        See HPI       Past Medical, Family and Social History: The patient's past medical, family and social history have been reviewed and updated as appropriate within the electronic medical record - see encounter notes.    Compliance: see HPI    Side effects: None    Risk Parameters:  Patient reports no suicidal ideation  Patient reports no homicidal ideation  Patient reports no self-injurious behavior  Patient reports no violent behavior    Exam (detailed: at least 9 elements; comprehensive: all 15 elements)   Constitutional  Vitals:  Most recent vital signs, dated less than 90 days prior to this appointment, were reviewed.   There were no vitals filed for this visit.     General:  unremarkable, age appropriate, normal weight, well nourished, casually dressed, neatly groomed     Musculoskeletal  Muscle Strength/Tone:  CHAR due to virtual visit   Gait & Station:  CHAR due to virtual visit     Psychiatric      Appearance:  unremarkable, age appropriate, normal weight, well nourished, casually dressed, neatly groomed   Behavior:  normal, friendly and cooperative     Speech:  no latency; no press, spontaneous   Mood & Affect:  euthymic  mood-congruent   Thought Process:  normal and logical   Associations:  intact   Thought Content:  normal, no suicidality, no homicidality, delusions, or paranoia   Insight:  intact   Judgement: behavior is adequate to circumstances, age appropriate   Orientation:  grossly intact   Memory: intact for content of interview   Language: grossly intact   Attention Span & Concentration:  able to focus   Fund of Knowledge:  intact and appropriate to age and level of education     Medications:  Outpatient Encounter Medications as of 2/24/2022   Medication Sig Dispense Refill    ARIPiprazole (ABILIFY) 15 MG Tab Take 1 tablet (15 mg total) by mouth once daily. 30 tablet 3    busPIRone (BUSPAR) 30 MG Tab Take 1 tablet (30 mg total) by mouth 2 (two) times daily. 60 tablet  3    DULoxetine (CYMBALTA) 60 MG capsule Take 1 capsule (60 mg total) by mouth once daily. 30 capsule 3    gabapentin (NEURONTIN) 100 MG capsule Take 1 capsule (100 mg total) by mouth every evening. 30 capsule 0    hydrOXYzine pamoate (VISTARIL) 25 MG Cap Take 1 capsule (25 mg total) by mouth every 6 (six) hours as needed (anxiety/insomnia). 60 capsule 3    tiZANidine (ZANAFLEX) 4 MG tablet Take 4 mg by mouth 2 (two) times daily as needed.       Facility-Administered Encounter Medications as of 2/24/2022   Medication Dose Route Frequency Provider Last Rate Last Admin    etonogestreL subdermal device 68 mg  68 mg Implant  Charan Barrett Jr., MD   68 mg at 02/07/22 1415       Allergy:  Review of patient's allergies indicates:   Allergen Reactions    No known drug allergies          Assessment and Diagnosis   Status/Progress: Based on the examination today, the patient's problem(s) is/are inadequately controlled.  New problems have not been presented today.   Lack of compliance is complicating management of the primary condition.        General Impression:       ICD-10-CM ICD-9-CM   1. Borderline personality disorder  F60.3 301.83   2. Depression, unspecified depression type  F32.A 311   3. Anxiety  F41.9 300.00   4. Polysubstance abuse  F19.10 305.90   5. Posttraumatic stress disorder with dissociative symptoms  F43.10 309.81       Diff Dx:  Bipolar d/o w/rapid cycling  Drug induced mood d/o      Intervention/Counseling/Treatment Plan     · Medication Management: The risks and benefits of medication were discussed with the patient.   · Continue Abilify 15 mg po q day  · Continue Cymbalta 60 mg po q day  · Continue Vistaril 25 mg q 6 hours prn anxiety/insomnia. Pt was told not drive or to take this med with ETOH or other sedating meds/substance. Pt verbalized understanding.   · Continue Buspar 30 mg BID  · Start trial of lamotrigine 25 mg q day x 14 days then 25 mg BID to target mood lability   · Continued to  recommend and encourage returning to individual therapy  · Refer for individual counseling  · Labs: reviewed most recent - pt was encouraged to have fasting labs drawn as previously ordered  I discussed with the patient the risks of extrapyramidal side effects (dystonia, akathisia, parkinsonism), metabolic syndrome (inlcuding hyperglycemia and hyperlipidemia), Neuroleptic Malignant Syndrome (fever, muscle rigidity, autonomic instability), orthostatic hypotension, and tardive dyskinesia with antipsychotic use. Pt was told to report these symptoms right away. Pt was notified that it is possible for these movements to become permanent. The risks and benefits of medication were discussed with the patient. The patient expresses understanding of the above and displays the capacity to agree with this treatment given said understanding. Patient also agrees that, currently, the benefits outweigh the risks and would like to pursue treatment at this time.   Lamictal: Discussed with patient the possibility of developing Rodriguez-Danilo Syndrome (SJS) with the use of Lamictal. Pt was told to STOP taking the Lamictal immediately if any rash develops. Pt was told not to use any new products that may cause a rash (ex. changing laundry detergent and soap, skin creams/lotions, perfume, etc). Also informed the pt of the most common side effects of the medication including dizziness, ataxia, somnolence, headache, blurred vision and nausea. The patient expresses understanding of the above and displays the capacity to agree with this treatment given said understanding. Patient also agrees that, currently, the benefits outweigh the risks and would like to pursue treatment at this time.    · The treatment plan and follow up plan were reviewed with the patient.  · Discussed with patient informed consent, risks vs. benefits, alternative treatments, side effect profile and the inherent unpredictability of individual responses to these  treatments. The patient expresses understanding of the above and displays the capacity to agree with this current plan and had no other questions.  · Encouraged Patient to keep future appointments.   · Take medications as prescribed and abstain from substance abuse.   · Present to ED or call 911 for SI/HI plan or intent, psychosis, or medical emergency.      Return to Clinic: 1 month, or sooner if needed        Face-to-face time with the patient:  28 minutes  Total time:  35 minutes of total time spent on the encounter, which includes face to face time and non-face to face time preparing to see the patient (eg, review of tests), Obtaining and/or reviewing separately obtained history, Documenting clinical information in the electronic or other health record, Independently interpreting results (not separately reported) and communicating results to the patient/family/caregiver, or Care coordination (not separately reported).       Keysha Maldonado, MSN, APRN, PMHNP-BC [facility]

## 2022-04-14 ENCOUNTER — PATIENT MESSAGE (OUTPATIENT)
Dept: OBSTETRICS AND GYNECOLOGY | Facility: CLINIC | Age: 22
End: 2022-04-14
Payer: MEDICAID

## 2022-05-12 ENCOUNTER — OFFICE VISIT (OUTPATIENT)
Dept: PSYCHIATRY | Facility: CLINIC | Age: 22
End: 2022-05-12
Payer: MEDICAID

## 2022-05-12 ENCOUNTER — PATIENT MESSAGE (OUTPATIENT)
Dept: FAMILY MEDICINE | Facility: HOSPITAL | Age: 22
End: 2022-05-12
Payer: MEDICAID

## 2022-05-12 DIAGNOSIS — F41.9 ANXIETY: ICD-10-CM

## 2022-05-12 DIAGNOSIS — F60.3 BORDERLINE PERSONALITY DISORDER: Primary | ICD-10-CM

## 2022-05-12 DIAGNOSIS — G89.29 CHRONIC BILATERAL LOW BACK PAIN WITH BILATERAL SCIATICA: ICD-10-CM

## 2022-05-12 DIAGNOSIS — M54.42 CHRONIC BILATERAL LOW BACK PAIN WITH BILATERAL SCIATICA: ICD-10-CM

## 2022-05-12 DIAGNOSIS — F32.A DEPRESSION, UNSPECIFIED DEPRESSION TYPE: ICD-10-CM

## 2022-05-12 DIAGNOSIS — M54.41 CHRONIC BILATERAL LOW BACK PAIN WITH BILATERAL SCIATICA: ICD-10-CM

## 2022-05-12 PROCEDURE — 1159F MED LIST DOCD IN RCRD: CPT | Mod: SA,HB,CPTII,95 | Performed by: NURSE PRACTITIONER

## 2022-05-12 PROCEDURE — 99214 PR OFFICE/OUTPT VISIT, EST, LEVL IV, 30-39 MIN: ICD-10-PCS | Mod: SA,HB,95, | Performed by: NURSE PRACTITIONER

## 2022-05-12 PROCEDURE — 1159F PR MEDICATION LIST DOCUMENTED IN MEDICAL RECORD: ICD-10-PCS | Mod: SA,HB,CPTII,95 | Performed by: NURSE PRACTITIONER

## 2022-05-12 PROCEDURE — 99214 OFFICE O/P EST MOD 30 MIN: CPT | Mod: SA,HB,95, | Performed by: NURSE PRACTITIONER

## 2022-05-12 PROCEDURE — 1160F RVW MEDS BY RX/DR IN RCRD: CPT | Mod: SA,HB,CPTII,95 | Performed by: NURSE PRACTITIONER

## 2022-05-12 PROCEDURE — 1160F PR REVIEW ALL MEDS BY PRESCRIBER/CLIN PHARMACIST DOCUMENTED: ICD-10-PCS | Mod: SA,HB,CPTII,95 | Performed by: NURSE PRACTITIONER

## 2022-05-12 RX ORDER — BUSPIRONE HYDROCHLORIDE 15 MG/1
TABLET ORAL
Qty: 30 TABLET | Refills: 2 | Status: SHIPPED | OUTPATIENT
Start: 2022-05-12 | End: 2023-09-14 | Stop reason: SDUPTHER

## 2022-05-12 RX ORDER — BUSPIRONE HYDROCHLORIDE 30 MG/1
TABLET ORAL
Qty: 30 TABLET | Refills: 2 | Status: SHIPPED | OUTPATIENT
Start: 2022-05-12 | End: 2023-04-04 | Stop reason: SDUPTHER

## 2022-05-12 RX ORDER — DULOXETIN HYDROCHLORIDE 60 MG/1
60 CAPSULE, DELAYED RELEASE ORAL DAILY
Qty: 30 CAPSULE | Refills: 2 | Status: SHIPPED | OUTPATIENT
Start: 2022-05-12 | End: 2022-11-28

## 2022-05-12 RX ORDER — LAMOTRIGINE 25 MG/1
TABLET ORAL
Qty: 90 TABLET | Refills: 2 | Status: SHIPPED | OUTPATIENT
Start: 2022-05-12 | End: 2023-01-01 | Stop reason: SDUPTHER

## 2022-05-12 NOTE — PROGRESS NOTES
"Outpatient Psychiatry Follow-Up Visit (MD/NP) - Telemedicine Visit    5/12/2022 10:14 AM  Cydney Patel  2000  [id number]      The patient location is: Patient reported that her location at the time of this visit was in the [facility]. States she is in a parked car while she is waiting for her sister and mother who are in the store.       Visit type: audiovisual    Each patient to whom he or she provides medical services by telemedicine is:  (1) informed of the relationship between the physician and patient and the respective role of any other health care provider with respect to management of the patient; and (2) notified that he or she may decline to receive medical services by telemedicine and may withdraw from such care at any time.        Chief Complaint:  Cydney Patel, a 22 y.o. female,who presents today for follow up of mood swings, depression, and anxiety.  Met with patient.      Interim Events/Subjective Report/Content of Current Session:     Pt reports that she the lamotrigine is working well. Less anger outbursts. States others around her have even made comments that she is less irritable. However, she does note that she is still easily irritated. Denies anhedonia and hopelessness.   States she can only take Buspar 15 mg during the day because the 30 mg makes her too sleepy. Denies any ASEs from her other psych meds.  States Vistaril is not effective.  Has been without her Abilify for at least 3 weeks because she lost the bottle. States she notices absolutely no change in mood without it. She feels like the lamotrigine is doing a much better job at controlling her mood lability.  States "I went from feeling nothing to feeling normal".  Has an appt with KELLI Velasco LCSW in a few weeks.  States she stopped taking all of her meds in Jan after her father (whom she lived with) moved to [place]. After that, she retunred to her baby's father and her car broke. She has since broken up with him " and is living with her friend in her friends car in front of her friend's house in [place]. Once her car is repaired, she is going to [place] to live with her aunt.  She is finishing up her classes at [employer]. She has 4 more weeks to go.  Sleeping ok. Appetite is good.    Pt denies any SI/HI. Denies AVH, paranoia and delusions. No objective s/sx of psychosis or zechariah.. Denies any EPS/TD.      Psychotherapy:  · Target symptoms: mood swings, mood disorder  · Why chosen therapy is appropriate versus another modality: relevant to diagnosis, patient responds to this modality  · Outcome monitoring methods: self-report, observation  · Therapeutic intervention type: supportive psychotherapy  · Topics discussed/themes: relationships difficulties, difficulty managing affect in interpersonal relationships, building skills sets for symptom management  · The patient's response to the intervention is accepting. The patient's progress toward treatment goals is good.   · Duration of intervention: 5 minutes.      Psychotropic medication review  Previous Trials-  Prozac  Doxepin  Lexapro  Abilify    Current meds-  Vistaril  Cymbalta  Buspar  Lamotrigine      Review of Systems       Review of Systems   Constitutional: Negative for chills, fever, malaise/fatigue and weight loss.   Respiratory: Negative for shortness of breath and wheezing.    Cardiovascular: Negative for chest pain and palpitations.   Gastrointestinal: Negative for diarrhea and vomiting.   Musculoskeletal: Negative for falls and myalgias.   Skin: Negative for rash.   Neurological: Negative for tremors, seizures and headaches.   Endo/Heme/Allergies: Does not bruise/bleed easily.   Psychiatric/Behavioral:        See HPI       Past Medical, Family and Social History: The patient's past medical, family and social history have been reviewed and updated as appropriate within the electronic medical record - see encounter notes.    Compliance: no - pt often stops taking  her meds    Side effects: None    Risk Parameters:  Patient reports no suicidal ideation  Patient reports no homicidal ideation  Patient reports no self-injurious behavior  Patient reports no violent behavior    Exam (detailed: at least 9 elements; comprehensive: all 15 elements)   Constitutional  Vitals:  Most recent vital signs, dated less than 90 days prior to this appointment, were reviewed.   There were no vitals filed for this visit.     General:  unremarkable, age appropriate, normal weight, well nourished, casually dressed, neatly groomed     Musculoskeletal  Muscle Strength/Tone:  CHAR due to virtual visit   Gait & Station:  CHAR due to virtual visit     Psychiatric      Appearance:  unremarkable, age appropriate, normal weight, well nourished, casually dressed, neatly groomed   Behavior:  normal, friendly and cooperative     Speech:  no latency; no press, spontaneous   Mood & Affect:  euthymic  mood-congruent   Thought Process:  normal and logical   Associations:  intact   Thought Content:  normal, no suicidality, no homicidality, delusions, or paranoia   Insight:  intact   Judgement: behavior is adequate to circumstances, age appropriate   Orientation:  grossly intact   Memory: intact for content of interview   Language: grossly intact   Attention Span & Concentration:  able to focus   Fund of Knowledge:  intact and appropriate to age and level of education     Medications:  Outpatient Encounter Medications as of 5/12/2022   Medication Sig Dispense Refill    ARIPiprazole (ABILIFY) 15 MG Tab Take 1 tablet (15 mg total) by mouth once daily. 30 tablet 3    busPIRone (BUSPAR) 30 MG Tab Take 1 tablet (30 mg total) by mouth 2 (two) times daily. 60 tablet 3    DULoxetine (CYMBALTA) 60 MG capsule Take 1 capsule (60 mg total) by mouth once daily. 30 capsule 3    gabapentin (NEURONTIN) 100 MG capsule Take 1 capsule (100 mg total) by mouth every evening. 30 capsule 0    hydrOXYzine pamoate (VISTARIL) 25 MG Cap  Take 1 capsule (25 mg total) by mouth every 6 (six) hours as needed (anxiety/insomnia). 60 capsule 3    lamoTRIgine (LAMICTAL) 25 MG tablet Take 1 tablet po q day x 14 days then take 1 tablet po BID 42 tablet 0    tiZANidine (ZANAFLEX) 4 MG tablet Take 4 mg by mouth 2 (two) times daily as needed.       Facility-Administered Encounter Medications as of 5/12/2022   Medication Dose Route Frequency Provider Last Rate Last Admin    etonogestreL subdermal device 68 mg  68 mg Implant  Charan Barrett Jr., MD   68 mg at 02/07/22 1415       Allergy:  Review of patient's allergies indicates:   Allergen Reactions    No known drug allergies          Assessment and Diagnosis   Status/Progress: Based on the examination today, the patient's problem(s) is/are improved and adequately but not ideally controlled.  New problems have not been presented today.   Lack of compliance is complicating management of the primary condition.        General Impression:       ICD-10-CM ICD-9-CM   1. Borderline personality disorder  F60.3 301.83   2. Depression, unspecified depression type  F32.A 311   3. Anxiety  F41.9 300.00       Diff Dx:  Bipolar d/o w/rapid cycling  Drug induced mood d/o      Intervention/Counseling/Treatment Plan     · Medication Management: The risks and benefits of medication were discussed with the patient.   · Continue Cymbalta 60 mg po q day  · Discontinue Vistaril  · Discontinue Abilify  · Decrease Buspar to 15 mg q am and 30 mg q hs per pt  · Increase lamotrigine to 25 mg q am and 50 mg q hs. Pt does not want to increase any further than this for now.  · Continued to recommend and encourage returning to individual therapy  · Refer for individual counseling  · Labs: reviewed most recent - pt was encouraged to have fasting labs drawn as previously ordered  I discussed with the patient the risks of extrapyramidal side effects (dystonia, akathisia, parkinsonism), metabolic syndrome (inlcuding hyperglycemia and  hyperlipidemia), Neuroleptic Malignant Syndrome (fever, muscle rigidity, autonomic instability), orthostatic hypotension, and tardive dyskinesia with antipsychotic use. Pt was told to report these symptoms right away. Pt was notified that it is possible for these movements to become permanent. The risks and benefits of medication were discussed with the patient. The patient expresses understanding of the above and displays the capacity to agree with this treatment given said understanding. Patient also agrees that, currently, the benefits outweigh the risks and would like to pursue treatment at this time.   Lamictal: Discussed with patient the possibility of developing Rodriguez-Danilo Syndrome (SJS) with the use of Lamictal. Pt was told to STOP taking the Lamictal immediately if any rash develops. Pt was told not to use any new products that may cause a rash (ex. changing laundry detergent and soap, skin creams/lotions, perfume, etc). Also informed the pt of the most common side effects of the medication including dizziness, ataxia, somnolence, headache, blurred vision and nausea. The patient expresses understanding of the above and displays the capacity to agree with this treatment given said understanding. Patient also agrees that, currently, the benefits outweigh the risks and would like to pursue treatment at this time.    · The treatment plan and follow up plan were reviewed with the patient.  · Discussed with patient informed consent, risks vs. benefits, alternative treatments, side effect profile and the inherent unpredictability of individual responses to these treatments. The patient expresses understanding of the above and displays the capacity to agree with this current plan and had no other questions.  · Encouraged Patient to keep future appointments.   · Take medications as prescribed and abstain from substance abuse.   · Present to ED or call 911 for SI/HI plan or intent, psychosis, or medical  emergency.      Return to Clinic: 4-6 weeks        Face-to-face time with the patient:  25 minutes  Total time:  35 minutes of total time spent on the encounter, which includes face to face time and non-face to face time preparing to see the patient (eg, review of tests), Obtaining and/or reviewing separately obtained history, Documenting clinical information in the electronic or other health record, Independently interpreting results (not separately reported) and communicating results to the patient/family/caregiver, or Care coordination (not separately reported).       Keysha Maldonado, MSN, APRN, PMHNP-BC  [facility]

## 2022-05-13 RX ORDER — GABAPENTIN 100 MG/1
100 CAPSULE ORAL NIGHTLY
Qty: 30 CAPSULE | Refills: 0 | Status: SHIPPED | OUTPATIENT
Start: 2022-05-13 | End: 2022-10-21 | Stop reason: SDUPTHER

## 2022-06-02 ENCOUNTER — PATIENT MESSAGE (OUTPATIENT)
Dept: PSYCHIATRY | Facility: CLINIC | Age: 22
End: 2022-06-02
Payer: MEDICAID

## 2022-06-06 ENCOUNTER — OFFICE VISIT (OUTPATIENT)
Dept: PSYCHIATRY | Facility: CLINIC | Age: 22
End: 2022-06-06
Payer: MEDICAID

## 2022-06-06 DIAGNOSIS — F43.10 POSTTRAUMATIC STRESS DISORDER WITH DISSOCIATIVE SYMPTOMS: Primary | ICD-10-CM

## 2022-06-06 DIAGNOSIS — F34.0 CYCLOTHYMIC DISORDER: ICD-10-CM

## 2022-06-06 PROCEDURE — 90791 PR PSYCHIATRIC DIAGNOSTIC EVALUATION: ICD-10-PCS | Mod: AJ,HB,, | Performed by: SOCIAL WORKER

## 2022-06-06 PROCEDURE — 90791 PSYCH DIAGNOSTIC EVALUATION: CPT | Mod: AJ,HB,, | Performed by: SOCIAL WORKER

## 2022-06-06 PROCEDURE — 1159F MED LIST DOCD IN RCRD: CPT | Mod: AJ,HB,CPTII, | Performed by: SOCIAL WORKER

## 2022-06-06 PROCEDURE — 1159F PR MEDICATION LIST DOCUMENTED IN MEDICAL RECORD: ICD-10-PCS | Mod: AJ,HB,CPTII, | Performed by: SOCIAL WORKER
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
## 2022-06-13 ENCOUNTER — TELEPHONE (OUTPATIENT)
Dept: PSYCHIATRY | Facility: CLINIC | Age: 22
End: 2022-06-13
Payer: MEDICAID

## 2022-06-13 NOTE — TELEPHONE ENCOUNTER
----- Message from Christine Malone sent at 6/10/2022  2:59 PM CDT -----  Regarding: Aly Mittal, the pt called to speak with you regarding scheduling an appt for Jaden Velasco.      She can be reached at [phone].    Thank you.

## 2022-07-09 ENCOUNTER — HOSPITAL ENCOUNTER (EMERGENCY)
Facility: HOSPITAL | Age: 22
Discharge: HOME | End: 2022-07-09
Attending: EMERGENCY MEDICINE
Payer: MEDICAID

## 2022-07-09 VITALS
TEMPERATURE: 99 F | OXYGEN SATURATION: 99 % | SYSTOLIC BLOOD PRESSURE: 122 MMHG | RESPIRATION RATE: 18 BRPM | WEIGHT: 110 LBS | DIASTOLIC BLOOD PRESSURE: 61 MMHG | HEART RATE: 84 BPM | BODY MASS INDEX: 20.24 KG/M2 | HEIGHT: 62 IN

## 2022-07-09 DIAGNOSIS — R60.9 SWELLING: ICD-10-CM

## 2022-07-09 DIAGNOSIS — S86.911A STRAIN OF RIGHT KNEE, INITIAL ENCOUNTER: Primary | ICD-10-CM

## 2022-07-09 DIAGNOSIS — L03.90 CELLULITIS, UNSPECIFIED CELLULITIS SITE: ICD-10-CM

## 2022-07-09 LAB
B-HCG UR QL: NEGATIVE
BILIRUB UR QL STRIP: NEGATIVE
CLARITY UR: ABNORMAL
COLOR UR: YELLOW
CTP QC/QA: YES
GLUCOSE UR QL STRIP: NEGATIVE
HGB UR QL STRIP: NEGATIVE
KETONES UR QL STRIP: ABNORMAL
LEUKOCYTE ESTERASE UR QL STRIP: NEGATIVE
NITRITE UR QL STRIP: NEGATIVE
PH UR STRIP: 7 [PH] (ref 5–8)
PROT UR QL STRIP: ABNORMAL
SP GR UR STRIP: 1.03 (ref 1–1.03)
URN SPEC COLLECT METH UR: ABNORMAL
UROBILINOGEN UR STRIP-ACNC: ABNORMAL EU/DL

## 2022-07-09 PROCEDURE — 25000003 PHARM REV CODE 250: Performed by: EMERGENCY MEDICINE

## 2022-07-09 PROCEDURE — 99284 EMERGENCY DEPT VISIT MOD MDM: CPT | Mod: 25

## 2022-07-09 PROCEDURE — 96372 THER/PROPH/DIAG INJ SC/IM: CPT | Performed by: EMERGENCY MEDICINE

## 2022-07-09 PROCEDURE — 81025 URINE PREGNANCY TEST: CPT | Performed by: NURSE PRACTITIONER

## 2022-07-09 PROCEDURE — 63600175 PHARM REV CODE 636 W HCPCS: Performed by: EMERGENCY MEDICINE

## 2022-07-09 PROCEDURE — 81003 URINALYSIS AUTO W/O SCOPE: CPT | Performed by: NURSE PRACTITIONER

## 2022-07-09 RX ORDER — ONDANSETRON 4 MG/1
4 TABLET, ORALLY DISINTEGRATING ORAL
Status: COMPLETED | OUTPATIENT
Start: 2022-07-09 | End: 2022-07-09

## 2022-07-09 RX ORDER — KETOROLAC TROMETHAMINE 30 MG/ML
10 INJECTION, SOLUTION INTRAMUSCULAR; INTRAVENOUS
Status: COMPLETED | OUTPATIENT
Start: 2022-07-09 | End: 2022-07-09

## 2022-07-09 RX ORDER — CEPHALEXIN 500 MG/1
500 CAPSULE ORAL 4 TIMES DAILY
Qty: 20 CAPSULE | Refills: 0 | Status: SHIPPED | OUTPATIENT
Start: 2022-07-09 | End: 2022-07-14

## 2022-07-09 RX ORDER — ONDANSETRON 4 MG/1
4 TABLET, ORALLY DISINTEGRATING ORAL EVERY 6 HOURS PRN
Qty: 15 TABLET | Refills: 0 | Status: SHIPPED | OUTPATIENT
Start: 2022-07-09 | End: 2022-11-28

## 2022-07-09 RX ORDER — HYDROCODONE BITARTRATE AND ACETAMINOPHEN 5; 325 MG/1; MG/1
1 TABLET ORAL EVERY 4 HOURS PRN
Qty: 18 TABLET | Refills: 0 | Status: SHIPPED | OUTPATIENT
Start: 2022-07-09 | End: 2022-10-21

## 2022-07-09 RX ORDER — CEPHALEXIN 500 MG/1
500 CAPSULE ORAL
Status: COMPLETED | OUTPATIENT
Start: 2022-07-09 | End: 2022-07-09

## 2022-07-09 RX ADMIN — ONDANSETRON 4 MG: 4 TABLET, ORALLY DISINTEGRATING ORAL at 09:07

## 2022-07-09 RX ADMIN — CEPHALEXIN 500 MG: 500 CAPSULE ORAL at 10:07

## 2022-07-09 RX ADMIN — KETOROLAC TROMETHAMINE 10 MG: 30 INJECTION, SOLUTION INTRAMUSCULAR at 09:07

## 2022-07-09 NOTE — Clinical Note
"Cydney "Vinod Patel was seen and treated in our emergency department on 7/9/2022.  She may return to work on 07/18/2022.       If you have any questions or concerns, please don't hesitate to call.      Nithin Marino MD"

## 2022-07-10 NOTE — ED PROVIDER NOTES
Encounter Date: 7/9/2022    SCRIBE #1 NOTE: I, Sathya Ram, am scribing for, and in the presence of, Nithin Marino.       History     Chief Complaint   Patient presents with    Vomiting     Patient presents to the ED with reports of having nausea and vomiting x 1 week. Also reports having right knee pain from abrasion.     Knee Pain     Cydney Patel is a 22 y.o. female that presents to the ED due to R leg pain.   Patient reports tripping, falling, and bruising her R knee 1 week ago. Her knee swelling and pain has been improving with Tylenol but complains of lower R thigh pain for 2 days. Pain is sharp and worse with movement and weight-bearing.  She was evaluated at an outside emergency department at which time she underwent x-ray imaging of the leg and was subsequently discharged 3 days prior.  She has injured this leg in the past after a fall which time she was told she had a hairline fracture in the knee.      The history is provided by the patient.     Review of patient's allergies indicates:   Allergen Reactions    No known drug allergies      Past Medical History:   Diagnosis Date    Allergy     Bipolar affective     Depression     Headache(784.0)     migraine    History of psychiatric hospitalization     Oppositional defiant disorder     Panic attacks     Self-harming behavior     Suicide attempt      Past Surgical History:   Procedure Laterality Date    DILATION AND CURETTAGE OF UTERUS  10/25/2021    termination of pregnancy    INSERTION OF CONTRACEPTIVE CAPSULE  2022     Family History   Problem Relation Age of Onset    Stroke Father 43        unknown cause    Hypertension Father     Anxiety disorder Father     ADD / ADHD Sister     Migraines Mother     Anxiety disorder Mother     Heart attack Maternal Grandmother     Migraines Maternal Grandmother     Heart attack Maternal Grandfather     Stroke Paternal Grandmother     Heart attack Paternal Grandmother     Stroke  Paternal Grandfather     Diabetes Neg Hx     Early death Neg Hx     Breast cancer Neg Hx     Colon cancer Neg Hx     Ovarian cancer Neg Hx      Social History     Tobacco Use    Smoking status: Current Every Day Smoker     Types: Vaping with nicotine    Smokeless tobacco: Current User   Substance Use Topics    Alcohol use: Yes     Comment: socially    Drug use: No     Review of Systems  Constitutional-no fever.  HEENT-no congestion  Eyes-no redness  Respiratory-no shortness of breath  Cardio-no chest pain  GI-no abdominal pain.  Endocrine-no cold intolerance  -no difficulty urinating  MSK-+leg pain  Skin-+knee abrasion  Allergy-no environmental allergy  Neurologic- negative  Hematology-no swollen nodes  Behavioral-no confusion    Physical Exam     Initial Vitals [07/09/22 1948]   BP Pulse Resp Temp SpO2   (!) 106/59 71 18 98.9 °F (37.2 °C) 99 %      MAP       --         Physical Exam  Constitutional: anxious appearing 21 yo female  Eyes: Conjunctivae normal.  ENT       Head: Normocephalic, atraumatic.       Nose: Normal external appearance        Mouth/Throat: no strigulous respirations   Hematological/Lymphatic/Immunilogical: no visible lymphadenopathy   Cardiovascular: Normal rate,   Respiratory: Normal respiratory effort.   Gastrointestinal: Nontender.  Musculoskeletal:  Pelvis stable, right knee is held in a mildly flexed position, diffuse tenderness extending from the right quadriceps through the right patella, no obvious effusion, strong DP pulse bilaterally  Neurologic: Alert, oriented. Normal speech and language. No gross focal neurologic deficits are appreciated.  Skin:  Scant abrasion over the right knee with mild surrounding erythema, no fluctuance no purulent drainage  Psychiatric: Mood and affect are normal.     ED Course   Procedures  Labs Reviewed   URINALYSIS, REFLEX TO URINE CULTURE - Abnormal; Notable for the following components:       Result Value    Appearance, UA Hazy (*)      Protein, UA Trace (*)     Ketones, UA 1+ (*)     Urobilinogen, UA 2.0-3.0 (*)     All other components within normal limits    Narrative:     Specimen Source->Urine   POCT URINE PREGNANCY          Imaging Results          X-Ray Knee 3 View Right (Final result)  Result time 07/09/22 21:46:54    Final result by Farhat Juarez MD (07/09/22 21:46:54)                 Impression:      No acute process.      Electronically signed by: Farhat Juarez MD  Date:    07/09/2022  Time:    21:46             Narrative:    EXAMINATION:  XR KNEE 3 VIEW RIGHT    CLINICAL HISTORY:  Edema, unspecified    TECHNIQUE:  AP, lateral, and Merchant views of the right knee were performed.    COMPARISON:  07/04/2022.    FINDINGS:  The bone mineralization is within normal limits.  There is no cortical step-off.  There is no evidence of periostitis.    The joint spaces are maintained.  The soft tissues are unremarkable.  No radiopaque foreign body is identified.    There is no evidence of a fracture or dislocation of the right knee.                                 Medications   ondansetron disintegrating tablet 4 mg (4 mg Oral Given 7/9/22 2103)   ketorolac injection 9.999 mg (9.999 mg Intramuscular Given 7/9/22 2103)   cephALEXin capsule 500 mg (500 mg Oral Given 7/9/22 2208)     Medical Decision Making:   History:   Old Medical Records: I decided to obtain old medical records.  Old Records Summarized: records from clinic visits and records from previous admission(s).  Differential Diagnosis:   Knee contusion, knee fracture, ACL rupture, meniscus tear, cellulitis, septic arthritis  Clinical Tests:   Radiological Study: Ordered and Reviewed  ED Management:  Set woman had a fall and a sensation of the knee shifting with intense pain and some instability thereafter.  Seems likely that she is suffering from some element of a soft tissue injury.  Discussed at length her need for outpatient follow-up with an orthopedist for further evaluation.  Given the  mild erythema will cover for potential cellulitis though this does not appear to be active clinical issue at this time.                      Clinical Impression:   Final diagnoses:  [R60.9] Swelling  [S86.911A] Strain of right knee, initial encounter (Primary)  [L03.90] Cellulitis, unspecified cellulitis site          ED Disposition Condition    Discharge Stable        ED Prescriptions     Medication Sig Dispense Start Date End Date Auth. Provider    cephALEXin (KEFLEX) 500 MG capsule Take 1 capsule (500 mg total) by mouth 4 (four) times daily. for 5 days 20 capsule 7/9/2022 7/14/2022 Nithin Marino MD    HYDROcodone-acetaminophen (NORCO) 5-325 mg per tablet Take 1 tablet by mouth every 4 (four) hours as needed for Pain. 18 tablet 7/9/2022  Nithin Marino MD    ondansetron (ZOFRAN-ODT) 4 MG TbDL Take 1 tablet (4 mg total) by mouth every 6 (six) hours as needed. 15 tablet 7/9/2022  Nithin Marino MD        Follow-up Information     Follow up With Specialties Details Why Contact Info Additional Information    [facility] Schedule an appointment as soon as possible for a visit in 3 days For a follow up visit about today, If symptoms worsen [address], [address]  [facility] [address] [phone] Please park in [address] or [address] and use [address]. Take Medical Office Bldg. elevators.           A scribe was used in creating this document and while some errors may exist it is the responsibility of Dr. Nithin Marino. To the best of reasonable ability this represents his impressions       Nithin Marino MD  07/10/22 [id number]

## 2022-07-10 NOTE — DISCHARGE INSTRUCTIONS
You may have an injury to a structure inside of the knee like her ACL or PCL.  Please use the crutches an Ace wrap.  You need to schedule appointment with an orthopedics doctor this week for a follow-up evaluation and evaluation of if you may need surgery to fix this.  Please take the pain medication as needed, finish the antibiotic prescribed to you.  Return to the emergency department in case of any worsening.    Mrs. Patel,    Thank you for letting me care for you today! It was nice meeting you, and I hope you feel better soon.   If you would like access to your chart and what was done today please utilize the [facility].   Please come back to [facility] for all of your future medical needs.    Our goal in the emergency department is to always give you outstanding care and exceptional service. You may receive a survey by mail or e-mail in the next week regarding your experience in our ED. We would greatly appreciate you completing and returning the survey. Your feedback provides us with a way to recognize our staff who give very good care and it helps us learn how to improve when your experience was below our aspiration of excellence.     Sincerely,    Nithin Marino MD  Board Certified Emergency Physician

## 2022-07-12 ENCOUNTER — PATIENT OUTREACH (OUTPATIENT)
Dept: EMERGENCY MEDICINE | Facility: HOSPITAL | Age: 22
End: 2022-07-12
Payer: MEDICAID

## 2022-08-18 ENCOUNTER — OFFICE VISIT (OUTPATIENT)
Dept: PSYCHIATRY | Facility: CLINIC | Age: 22
End: 2022-08-18
Payer: MEDICAID

## 2022-08-18 ENCOUNTER — PATIENT MESSAGE (OUTPATIENT)
Dept: PSYCHIATRY | Facility: CLINIC | Age: 22
End: 2022-08-18
Payer: MEDICAID

## 2022-08-18 DIAGNOSIS — F34.0 CYCLOTHYMIC DISORDER: Primary | ICD-10-CM

## 2022-08-18 PROCEDURE — 90837 PSYTX W PT 60 MINUTES: CPT | Mod: AJ,HB,95, | Performed by: SOCIAL WORKER

## 2022-08-18 PROCEDURE — 1159F PR MEDICATION LIST DOCUMENTED IN MEDICAL RECORD: ICD-10-PCS | Mod: AJ,HB,CPTII,95 | Performed by: SOCIAL WORKER

## 2022-08-18 PROCEDURE — 1159F MED LIST DOCD IN RCRD: CPT | Mod: AJ,HB,CPTII,95 | Performed by: SOCIAL WORKER

## 2022-08-18 PROCEDURE — 90837 PR PSYCHOTHERAPY W/PATIENT, 60 MIN: ICD-10-PCS | Mod: AJ,HB,95, | Performed by: SOCIAL WORKER

## 2022-08-18 NOTE — PROGRESS NOTES
"The patient location is: [place]  The chief complaint leading to consultation is: anxiety    Visit type: audiovisual    Face to Face time with patient: 60  70 minutes of total time spent on the encounter, which includes face to face time and non-face to face time preparing to see the patient (eg, review of tests), Obtaining and/or reviewing separately obtained history, Documenting clinical information in the electronic or other health record, Independently interpreting results (not separately reported) and communicating results to the patient/family/caregiver, or Care coordination (not separately reported).     Individual Psychotherapy (LCSW/PhD)  Cydney Patel,  8/18/2022    Site:  [facility]         Therapeutic Intervention: Met with patient for individual psychotherapy.    Chief complaint/reason for encounter: depression and anxiety     Interval history and content of current session: Pt began the session by reporting she traumatized her jaw muscle from her wisdom teeth extraction. She reported she gets upset when things don't go a certain way. She is obsessing over messages being received. She removed herself from the situation. Pt is labile, irritable. When she gets angry, she feels hot, stops hearing, feels it in her finger tips and her nose, then her body shuts down. Pt is working 2 jobs in [place], although living In [place]. PT reported the Cymbalta just makes her feel like "I don't care." We discussed strategies to improve present moment focus. WE discussed learning dialeticals. She denies SI, no HI or AVH.     Treatment plan:  · Target symptoms: depression, mood swings  · Why chosen therapy is appropriate versus another modality: relevant to diagnosis, patient responds to this modality, evidence based practice  · Outcome monitoring methods: self-report, observation  · Therapeutic intervention type: insight oriented psychotherapy, supportive psychotherapy    Risk " parameters:  Patient reports no suicidal ideation  Patient reports no homicidal ideation  Patient reports no self-injurious behavior  Patient reports no violent behavior    Verbal deficits: None    Patient's response to intervention:  The patient's response to intervention is accepting.    Progress toward goals and other mental status changes:  The patient's progress toward goals is limited.    Diagnosis:     ICD-10-CM ICD-9-CM   1. Cyclothymic disorder  F34.0 301.13       Plan: Pt plans to continue individual psychotherapy    Return to clinic: as scheduled    Length of Service (minutes): 60        Each patient to whom he or she provides medical services by telemedicine is:  (1) informed of the relationship between the physician and patient and the respective role of any other health care provider with respect to management of the patient; and (2) notified that he or she may decline to receive medical services by telemedicine and may withdraw from such care at any time.

## 2022-08-25 ENCOUNTER — PATIENT MESSAGE (OUTPATIENT)
Dept: PSYCHIATRY | Facility: CLINIC | Age: 22
End: 2022-08-25
Payer: MEDICAID

## 2022-09-01 ENCOUNTER — OFFICE VISIT (OUTPATIENT)
Dept: PSYCHIATRY | Facility: CLINIC | Age: 22
End: 2022-09-01
Payer: MEDICAID

## 2022-09-01 ENCOUNTER — PATIENT MESSAGE (OUTPATIENT)
Dept: PSYCHIATRY | Facility: CLINIC | Age: 22
End: 2022-09-01
Payer: MEDICAID

## 2022-09-01 DIAGNOSIS — F34.0 CYCLOTHYMIC DISORDER: Primary | ICD-10-CM

## 2022-09-01 PROCEDURE — 1159F MED LIST DOCD IN RCRD: CPT | Mod: AJ,HB,CPTII,95 | Performed by: SOCIAL WORKER

## 2022-09-01 PROCEDURE — 90785 PSYTX COMPLEX INTERACTIVE: CPT | Mod: AJ,HB,95, | Performed by: SOCIAL WORKER

## 2022-09-01 PROCEDURE — 90834 PR PSYCHOTHERAPY W/PATIENT, 45 MIN: ICD-10-PCS | Mod: AJ,HB,95, | Performed by: SOCIAL WORKER

## 2022-09-01 PROCEDURE — 90834 PSYTX W PT 45 MINUTES: CPT | Mod: AJ,HB,95, | Performed by: SOCIAL WORKER

## 2022-09-01 PROCEDURE — 1159F PR MEDICATION LIST DOCUMENTED IN MEDICAL RECORD: ICD-10-PCS | Mod: AJ,HB,CPTII,95 | Performed by: SOCIAL WORKER

## 2022-09-01 PROCEDURE — 90785 PR INTERACTIVE COMPLEXITY: ICD-10-PCS | Mod: AJ,HB,95, | Performed by: SOCIAL WORKER

## 2022-09-01 NOTE — PROGRESS NOTES
The patient location is: [place]  The chief complaint leading to consultation is: anxiety    Visit type: audiovisual    Face to Face time with patient: 40  45 minutes of total time spent on the encounter, which includes face to face time and non-face to face time preparing to see the patient (eg, review of tests), Obtaining and/or reviewing separately obtained history, Documenting clinical information in the electronic or other health record, Independently interpreting results (not separately reported) and communicating results to the patient/family/caregiver, or Care coordination (not separately reported).     Individual Psychotherapy (LCSW/PhD)  Cydney Patel,  9/1/2022    Site:  [facility]         Therapeutic Intervention: Met with patient for individual psychotherapy.    Chief complaint/reason for encounter: depression and anxiety     Interval history and content of current session: Pt began the session by reporting she stopped Cymbalta because it was making her cycle through moods. She is taking Lamictal and Buspar. We discussed talking to her psychiatrist about this change. She has an appt with her on 9/8. She is feeling more stable on this regimen. She is able to reality test. Her mouth/jaw is a lot better. PT reported the only techniques that works with her mom is walking away. We discussed 5,4,3,2,1 to utilize in times of distress. Hand out was provided to pt to increase understanding of this concept. She reported she gets fixated on little things, it can be someone ending in a conversation in a certain tone of voice. She reported she is internalizing and personalizing . Pt reported she has black or white thinking. Therapist provided further education with a hand-out on the different thinking styles. PT reported she is working 3 jobs. Pt reported moving into an apartment that her parents chose, which put her over budget. 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  She is working to reframing. Therapist sent her reframing strategies. PT reported she is now meditating. She denies SI, no HI or AVH.     Treatment plan:  Target symptoms: depression, mood swings  Why chosen therapy is appropriate versus another modality: relevant to diagnosis, patient responds to this modality, evidence based practice  Outcome monitoring methods: self-report, observation  Therapeutic intervention type: insight oriented psychotherapy, supportive psychotherapy    Risk parameters:  Patient reports no suicidal ideation  Patient reports no homicidal ideation  Patient reports no self-injurious behavior  Patient reports no violent behavior    Verbal deficits: None    Patient's response to intervention:  The patient's response to intervention is accepting.    Progress toward goals and other mental status changes:  The patient's progress toward goals is limited.    Diagnosis:     ICD-10-CM ICD-9-CM   1. Cyclothymic disorder  F34.0 301.13   2. Post partum depression  O99.345 648.44    F53.0 311         Plan: Pt plans to continue individual psychotherapy    Return to clinic: as scheduled    Length of Service (minutes): 45        Each patient to whom he or she provides medical services by telemedicine is:  (1) informed of the relationship between the physician and patient and the respective role of any other health care provider with respect to management of the patient; and (2) notified that he or she may decline to receive medical services by telemedicine and may withdraw from such care at any time.

## 2022-09-06 ENCOUNTER — PATIENT MESSAGE (OUTPATIENT)
Dept: PSYCHIATRY | Facility: CLINIC | Age: 22
End: 2022-09-06
Payer: MEDICAID

## 2022-09-06 ENCOUNTER — TELEPHONE (OUTPATIENT)
Dept: PSYCHIATRY | Facility: CLINIC | Age: 22
End: 2022-09-06
Payer: MEDICAID

## 2022-09-20 ENCOUNTER — PATIENT MESSAGE (OUTPATIENT)
Dept: PSYCHIATRY | Facility: CLINIC | Age: 22
End: 2022-09-20
Payer: MEDICAID

## 2022-10-21 ENCOUNTER — OFFICE VISIT (OUTPATIENT)
Dept: FAMILY MEDICINE | Facility: HOSPITAL | Age: 22
End: 2022-10-21
Payer: MEDICAID

## 2022-10-21 VITALS
HEART RATE: 95 BPM | DIASTOLIC BLOOD PRESSURE: 81 MMHG | HEIGHT: 62 IN | SYSTOLIC BLOOD PRESSURE: 127 MMHG | BODY MASS INDEX: 18.75 KG/M2 | WEIGHT: 101.88 LBS

## 2022-10-21 DIAGNOSIS — M76.32 IT BAND SYNDROME, LEFT: Primary | ICD-10-CM

## 2022-10-21 DIAGNOSIS — M54.41 CHRONIC BILATERAL LOW BACK PAIN WITH BILATERAL SCIATICA: ICD-10-CM

## 2022-10-21 DIAGNOSIS — M54.42 CHRONIC BILATERAL LOW BACK PAIN WITH BILATERAL SCIATICA: ICD-10-CM

## 2022-10-21 DIAGNOSIS — G89.29 CHRONIC BILATERAL LOW BACK PAIN WITH BILATERAL SCIATICA: ICD-10-CM

## 2022-10-21 PROCEDURE — 99214 OFFICE O/P EST MOD 30 MIN: CPT | Performed by: STUDENT IN AN ORGANIZED HEALTH CARE EDUCATION/TRAINING PROGRAM

## 2022-10-21 RX ORDER — GABAPENTIN 100 MG/1
100 CAPSULE ORAL NIGHTLY
Qty: 30 CAPSULE | Refills: 0 | Status: SHIPPED | OUTPATIENT
Start: 2022-10-21 | End: 2022-11-28

## 2022-10-22 NOTE — PROGRESS NOTES
[facility]  History & Physical    SUBJECTIVE:     Chief Complaint:   Chief Complaint   Patient presents with    Knee Pain     left       History of Present Illness:  22 y.o. female who  has a past medical history of Allergy, Bipolar affective, Depression, Headache(784.0), History of psychiatric hospitalization, Oppositional defiant disorder, Panic attacks, Self-harming behavior, and Suicide attempt. presents to clinic today for knee pain. Patient had a fall onto her right knee on concrete. Initial assessment showed no fractures or dislocation. Patient states that she began using crutches daily. Over time patients right knee began to feel better but left knee and thigh became painful. Patient states she now walks with a limp and is unable to fully extend or flex her leg. Patient also reports a numbness that develops at mid thigh and extends to the left foots. Patient denies swelling, redness in her knees.      Allergies:  Review of patient's allergies indicates:   Allergen Reactions    No known drug allergies        Home Medications:  Current Outpatient Medications on File Prior to Visit   Medication Sig    busPIRone (BUSPAR) 15 MG tablet Take 1 tablet po q am    busPIRone (BUSPAR) 30 MG Tab Take 1 tablet po q hs    lamoTRIgine (LAMICTAL) 25 MG tablet Take 1 tablet po q am and 2 tablets po q hs    ondansetron (ZOFRAN-ODT) 4 MG TbDL Take 1 tablet (4 mg total) by mouth every 6 (six) hours as needed.    tiZANidine (ZANAFLEX) 4 MG tablet Take 4 mg by mouth 2 (two) times daily as needed.    DULoxetine (CYMBALTA) 60 MG capsule Take 1 capsule (60 mg total) by mouth once daily.    HYDROcodone-acetaminophen (NORCO) 5-325 mg per tablet Take 1 tablet by mouth every 4 (four) hours as needed for Pain.    [DISCONTINUED] gabapentin (NEURONTIN) 100 MG capsule Take 1 capsule (100 mg total) by mouth every evening.     Current Facility-Administered Medications on File Prior to Visit   Medication    etonogestreL  subdermal device 68 mg       Past Medical History:   Diagnosis Date    Allergy     Bipolar affective     Depression     Headache(784.0)     migraine    History of psychiatric hospitalization     Oppositional defiant disorder     Panic attacks     Self-harming behavior     Suicide attempt      Past Surgical History:   Procedure Laterality Date    DILATION AND CURETTAGE OF UTERUS  10/25/2021    termination of pregnancy    INSERTION OF CONTRACEPTIVE CAPSULE  2022     Family History   Problem Relation Age of Onset    Stroke Father 43        unknown cause    Hypertension Father     Anxiety disorder Father     ADD / ADHD Sister     Migraines Mother     Anxiety disorder Mother     Heart attack Maternal Grandmother     Migraines Maternal Grandmother     Heart attack Maternal Grandfather     Stroke Paternal Grandmother     Heart attack Paternal Grandmother     Stroke Paternal Grandfather     Diabetes Neg Hx     Early death Neg Hx     Breast cancer Neg Hx     Colon cancer Neg Hx     Ovarian cancer Neg Hx      Social History     Tobacco Use    Smoking status: Every Day     Types: Vaping with nicotine    Smokeless tobacco: Current   Substance Use Topics    Alcohol use: Yes     Comment: socially    Drug use: No        Review of Systems   Constitutional:  Negative for fever and weight loss.   HENT:  Negative for hearing loss and sore throat.    Eyes:  Negative for blurred vision.   Respiratory:  Negative for cough, shortness of breath and wheezing.    Cardiovascular:  Negative for chest pain and leg swelling.   Gastrointestinal:  Negative for heartburn, nausea and vomiting.   Genitourinary:  Negative for dysuria.   Musculoskeletal:  Positive for joint pain. Negative for back pain and myalgias.   Neurological:  Negative for dizziness, weakness and headaches.      OBJECTIVE:     Vital Signs:  Pulse: 95 (10/21/22 1030)  BP: 127/81 (10/21/22 1030)    Physical Exam  HENT:      Head: Normocephalic.       Right Ear: External ear normal.      Left Ear: External ear normal.      Nose: Nose normal.      Mouth/Throat:      Mouth: Mucous membranes are moist.   Eyes:      Extraocular Movements: Extraocular movements intact.      Pupils: Pupils are equal, round, and reactive to light.   Cardiovascular:      Rate and Rhythm: Normal rate.      Pulses: Normal pulses.   Pulmonary:      Effort: Pulmonary effort is normal.   Abdominal:      Palpations: Abdomen is soft.   Musculoskeletal:         General: Tenderness present.      Cervical back: Normal range of motion.      Comments: Reduced range of motion of left knee. Knee is held slightly extended at all times and does not rest in a neutral postion when dangling off exam table.Tenderness most prominent inferior to left patella and along the length of left IT band. Reflexes +2. Sensation intact distal to knees.   Skin:     General: Skin is warm.      Capillary Refill: Capillary refill takes less than 2 seconds.   Neurological:      General: No focal deficit present.      Mental Status: She is alert and oriented to person, place, and time.   Psychiatric:         Mood and Affect: Mood normal.       Laboratory:  No results found for: HGBA1C    A/P:  Cydney was seen today for knee pain. Imaging does not show acute fracture. History and exam indicate likely severe IT band syndrome secondary to compensation for injured opposite leg. Referral placed to PT. Advised foam rolling to release tension in affected IT band. Refilled patient gabapentin for previously diagnosed chronic back pain.    Diagnoses and all orders for this visit:    It band syndrome, left  -     Ambulatory referral/consult to Physical/Occupational Therapy; Future    Chronic bilateral low back pain with bilateral sciatica  -     gabapentin (NEURONTIN) 100 MG capsule; Take 1 capsule (100 mg total) by mouth every evening.        Follow up in about 3 months (around 1/21/2023).      Crow Camacho MD  [facility]  PGY-2  10/21/2022

## 2022-11-04 NOTE — PROGRESS NOTES
I assume primary medical responsibility for this patient. I have reviewed the history, physical, and assessement & treatment plan with the resident and agree that the care is reasonable and necessary. This service has been performed by a resident without the presence of a teaching physician under the primary care exception. If necessary, an addendum of additional findings or evaluation beyond the resident documentation will be noted below.     Toyin Irby MD

## 2022-11-28 ENCOUNTER — OFFICE VISIT (OUTPATIENT)
Dept: OBSTETRICS AND GYNECOLOGY | Facility: CLINIC | Age: 22
End: 2022-11-28
Payer: MEDICAID

## 2022-11-28 DIAGNOSIS — N92.1 BREAKTHROUGH BLEEDING ON NEXPLANON: Primary | ICD-10-CM

## 2022-11-28 DIAGNOSIS — Z97.5 BREAKTHROUGH BLEEDING ON NEXPLANON: Primary | ICD-10-CM

## 2022-11-28 PROCEDURE — 1159F PR MEDICATION LIST DOCUMENTED IN MEDICAL RECORD: ICD-10-PCS | Mod: CPTII,,, | Performed by: OBSTETRICS & GYNECOLOGY

## 2022-11-28 PROCEDURE — 99214 PR OFFICE/OUTPT VISIT, EST, LEVL IV, 30-39 MIN: ICD-10-PCS | Mod: S$PBB,,, | Performed by: OBSTETRICS & GYNECOLOGY

## 2022-11-28 PROCEDURE — 99999 PR PBB SHADOW E&M-EST. PATIENT-LVL II: CPT | Mod: PBBFAC,,, | Performed by: OBSTETRICS & GYNECOLOGY

## 2022-11-28 PROCEDURE — 99212 OFFICE O/P EST SF 10 MIN: CPT | Mod: PBBFAC | Performed by: OBSTETRICS & GYNECOLOGY

## 2022-11-28 PROCEDURE — 99999 PR PBB SHADOW E&M-EST. PATIENT-LVL II: ICD-10-PCS | Mod: PBBFAC,,, | Performed by: OBSTETRICS & GYNECOLOGY

## 2022-11-28 PROCEDURE — 99214 OFFICE O/P EST MOD 30 MIN: CPT | Mod: S$PBB,,, | Performed by: OBSTETRICS & GYNECOLOGY

## 2022-11-28 PROCEDURE — 1159F MED LIST DOCD IN RCRD: CPT | Mod: CPTII,,, | Performed by: OBSTETRICS & GYNECOLOGY

## 2022-11-28 RX ORDER — IBUPROFEN 600 MG/1
600 TABLET ORAL EVERY 6 HOURS PRN
COMMUNITY
Start: 2022-09-20 | End: 2023-02-24

## 2022-11-28 NOTE — PROGRESS NOTES
"PT HERE WITH A "MENSES" Q MONTH WITH SPOTTING BEFORE AND AFTER ON NEXPLANON.  NOT HEAVY AND THIS IS NOT A PROBLEM FOR HER.  ASKED ABOUT LAMICTAL AND NEXPLANON.    ROS:  GENERAL: No fever, chills, fatigability or weight loss.  VULVAR: No pain, no lesions and no itching.  VAGINAL: No relaxation, no itching, no discharge, no abnormal bleeding and no lesions.  ABDOMEN: No abdominal pain. Denies nausea. Denies vomiting. No diarrhea. No constipation  BREAST: Denies pain. No lumps. No discharge.  URINARY: No incontinence, no nocturia, no frequency and no dysuria.  CARDIOVASCULAR: No chest pain. No shortness of breath. No leg cramps.  NEUROLOGICAL: No headaches. No vision changes.  The remainder of the review of systems was negative.    PE:  General Appearance: normal weight And Well developed. Well nourished. In no acute distress.  Vulva: Lesions: No.  Urethral Meatus: Normal size. Normal location. No lesions. No prolapse.  Urethra: No masses. No tenderness. No prolapse. No scarring.  Bladder: No masses. No tenderness.  Vagina: Mucosa NI:yes discharge no, atrophy no, cystocele no or rectocele no.  Cervix: Lesion: no  Stenotic: no Cervical motion tenderness: no  Uterus: Uterus size: 5 weeks. Support good. Uterus size: Normal  Adnexa: Masses: No Tenderness: No CDS Nodularity: No  Abdomen: normal weight No masses. No tenderness.  CHEST: CTA B  HEART: RRR  EXT: NO EDEMA      PROCEDURES:    PLAN:     DIAGNOSIS:  1. Breakthrough bleeding on Nexplanon        MEDICATIONS & ORDERS:       20 MIN D/W PT ON AUB    FOLLOW-UP: With me ANNUAL    Charan Barrett Jr, MD, FACOG      "

## 2023-01-03 ENCOUNTER — PATIENT MESSAGE (OUTPATIENT)
Dept: PSYCHIATRY | Facility: CLINIC | Age: 23
End: 2023-01-03
Payer: MEDICAID

## 2023-01-04 ENCOUNTER — CLINICAL SUPPORT (OUTPATIENT)
Dept: REHABILITATION | Facility: HOSPITAL | Age: 23
End: 2023-01-04
Payer: MEDICAID

## 2023-01-04 DIAGNOSIS — Z74.09 DECREASED FUNCTIONAL MOBILITY AND ENDURANCE: ICD-10-CM

## 2023-01-04 PROCEDURE — 97161 PT EVAL LOW COMPLEX 20 MIN: CPT | Mod: PN | Performed by: PHYSICAL THERAPIST

## 2023-01-04 NOTE — PLAN OF CARE
"[facility]  Physical Therapy Initial Evaluation    Date: 1/4/2023   Name: Cydney Patel  Clinic Number: [id number]    Therapy Diagnosis:   Encounter Diagnosis   Name Primary?    Decreased functional mobility and endurance      Physician: Crow Camacho MD    Physician Orders: PT Eval and Treat   Medical Diagnosis from Referral: M76.32 (ICD-10-CM) - It band syndrome, left  Evaluation Date: 1/4/2023  Authorization Period Expiration: 10/21/23  Plan of Care Expiration: 2/23/23  Progress Note Due: 2/23/23  Visit # / Visits authorized: 1/ 1 (EVAL ONLY)   FOTO: 1/ 1   PTA Visit: 0/5    Precautions: Standard; NO CUPPING (per patient request)    Time In: 8:10 am  Time Out: 9:00 am  Total Appointment Time (timed & untimed codes): 40 minutes (LCE)      SUBJECTIVE   Date of onset: 7/1/22    History of current condition - Cydney reports: Patient had a fall onto her Bilateral knees on concrete while running. Initial assessment showed no fractures or dislocation. Patient states that she began using crutches daily for about 2 months. Over time patients right knee began to feel better but left knee and thigh became painful. Patient states she now walks with a limp and is unable to fully extend or flex her leg. Patient also reports a numbness that develops at mid thigh and extends to the left foots. She's been referred to [facility] for PT.    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)  Prior Level of Function: no limitations  Current Level of Function: difficulty with stairs, standing at work (tries to use stool), walking to shop; stopped going to the gym    Pain:  Current 2/10, worst 10/10, best 2/10   Location: left knee under patella and lateral thigh  Description: tight like a band, like it could " pop  Aggravating Factors: walking, running; standing at work  Easing Factors: laying down, self massage, heat pad    Patients goals: get lifestyle back     Medical History:   Past Medical History:   Diagnosis Date    Allergy     Bipolar affective     Depression     Headache(784.0)     migraine    History of psychiatric hospitalization     Oppositional defiant disorder     Panic attacks     Self-harming behavior     Suicide attempt        Surgical History:   Cydney Patel  has a past surgical history that includes Dilation and curettage of uterus (10/25/2021) and Insertion of contraceptive capsule (2022).    Medications:   Cydney has a current medication list which includes the following prescription(s): buspirone, buspirone, ibuprofen, and lamotrigine, and the following Facility-Administered Medications: etonogestrel.    Allergies:   Review of patient's allergies indicates:   Allergen Reactions    No known drug allergies           OBJECTIVE       Palpation: left iliotibial band, greater trochanter, quad tendon      Gross Movement Analysis:  - squat: 50% depth, difficulty ascending (mild unsteady), mild valgus collapse B   - gait: antalgic, mildly decreased L TKE  - Sit-to-Stand: no upper extremity needed      Range of Motion:   Knee AROM:   Right: 4-0-140 degree   Left: 7-0-145 degree       Lower Extremity Strength    All below testing performed in seated position. Gait belt used for quadriceps testing.    Lower extremity strength with [employer] handheld dynamometer Right Left Pain/dysfunction with movement   (approx 4 sec hold w/ max contraction)   Hip flexion 9.7 kg  9.2 kg     Hip abduction 8.9 kg  12.6 kg     Quadriceps 19.5 kg  7.8 kg     Hamstrings 6.2 kg  3.5 kg         Special Tests:   Right Left   Valgus Stress Test (-) (-)   Varus Stress test (-) (-)   Lachman's test (-) (-)   Posterior drawer (-) (-)   Anterior drawer (-) (-)   Petra's Test (-) (-)   Thessaly's Test (-) (-)   Cabrera's compression  "test  (-) (-)   Patellar Grind Test (-) (-)     Joint Mobility: good  Patellar  Sensation: intact light touch sensation to BLE throughout L2-S2 dermatomal pattern  Flexibility:       Hamstring 90/90: right: 30 degree , left 30 degree    Obers: -         TU seconds  TUG Cutoff Scores: 13.5         30 second sit-to-stand test (without U/E support): 8x  30" sit to stand Cutoff Scores:15           CMS Impairment/Limitation/Restriction for FOTO Knee Survey    Therapist reviewed FOTO scores for Cydney Patel on 2023.   FOTO documents entered into [employer] - see Media section.    Limitation Score: 46%  Predicted Limitation Score: 30%         TREATMENT     Total Treatment time (time-based codes) separate from Evaluation: 0 minutes (EVAL ONLY)     Cydney received therapeutic exercises to develop strength, endurance, ROM, flexibility, posture, and core stabilization for 0 minutes including:  Home exercise program:  Supine hamstring stretch  Supine iliotibial band stretch  Quad set with towel roll under ankle  Straight leg raise  Hip abduction straight leg raise  Clamshells    Next:  Triple flexion  Long arc quad  Theraband hamstring curls    Leg Press: double leg         PATIENT EDUCATION AND HOME EXERCISES     Education provided:   - anatomy  - importance of HEP    Written Home Exercises Provided: yes. Exercises were reviewed and Cydney was able to demonstrate them prior to the end of the session.  Cydney demonstrated good  understanding of the education provided. See EMR under Patient Instructions for exercises provided during therapy sessions.    ASSESSMENT     Cydney is a 22 y.o. female referred to outpatient Physical Therapy with a medical diagnosis of left iliotibial band syndrome. Pt presents with complaint of original fall onto right knee, which has since improved. During recovery, she began having severe left knee pain. Bilateral lower extremity weakness present via microFET testing, particularly of her " quad. (-) Obers and pantera compression testing, but tender to palpation along distal iliotibial band and left greater trochanter. (+) medial joint line tenderness and McMurrays testing possibly indicative of meniscal pathology. She currently works as a  (a lot of standing) and  (they allow her to sit). Her deficits have affected her ability to stand (especially at work), walk, negotiate stairs, squat and work out. She does report losing weight since being unable to exercise. She is appropriate for skilled PT to help her reach her goals.    Pt prognosis is Good.   Pt will benefit from skilled outpatient Physical Therapy to address the deficits stated above and in the chart below, provide pt/family education, and to maximize pt's level of independence.     Plan of care discussed with patient: Yes  Pt's spiritual, cultural and educational needs considered and patient is agreeable to the plan of care and goals as stated below:     Anticipated Barriers for therapy: busy work schedule/type of work    Medical Necessity is demonstrated by the following  History  Co-morbidities and personal factors that may impact the plan of care Co-morbidities:   Allergy, Bipolar affective, Depression, Headache(784.0), History of psychiatric hospitalization, Oppositional defiant disorder, Panic attacks, Self-harming behavior, and Suicide attempt     Personal Factors:   no deficits     high   Examination  Body Structures and Functions, activity limitations and participation restrictions that may impact the plan of care Body Regions:   lower extremities    Body Systems:    ROM  strength  balance  gait  transfers  transitions  motor control    Participation Restrictions:   Community walking, stairs, work    Activity limitations:   Learning and applying knowledge  no deficits    General Tasks and Commands  no deficits    Communication  no deficits    Mobility  lifting and carrying objects  walking    Self care  no  deficits    Domestic Life  shopping  cooking  doing house work (cleaning house, washing dishes, laundry)    Interactions/Relationships  no deficits    Life Areas  employment    Community and Social Life  community life  recreation and leisure         high     Clinical Presentation stable and uncomplicated low   Decision Making/ Complexity Score: low   Goals:  Short Term Goals: 3 weeks   1.  Patient will demonstrate Bilateral hamstring strength via MicroFET > 8 kg to improve functional mobility.  2.  Patient will demonstrate left quad strength via MicroFET of > 14 kg to improve transfers, gait and ADL performance.  3.  Patient will report worst Bilateral knee pain < 5/10.    Long Term Goals: 7 weeks   1.  Patient will demonstrate ability to perform > 13 sit <> stand transfers w/o UE use for improved ability to stand from low surfaces.  2.  Patient will demonstrate ability to negotiate a flight of stairs with 0 HR and reciprocal pattern.  3.  Patient will demonstrate to PT comprehensive understanding of each HEP component without verbal cueing.   4.  Patient will improve FOTO to < 31% to improve ADL performance.    PLAN   Plan of care Certification: 1/4/2023 to 2/17/23.    Outpatient Physical Therapy 2 times weekly for 7 weeks to include the following interventions: Electrical Stimulation TENS, IFC, NMES, Gait Training, Manual Therapy, Moist Heat/ Ice, Neuromuscular Re-ed, Patient Education, Self Care, Therapeutic Activities, Therapeutic Exercise, and dry needling.     Adilson Maria, PT      I CERTIFY THE NEED FOR THESE SERVICES FURNISHED UNDER THIS PLAN OF TREATMENT AND WHILE UNDER MY CARE   Physician's comments:     Physician's Signature: ___________________________________________________

## 2023-01-09 ENCOUNTER — TELEPHONE (OUTPATIENT)
Dept: REHABILITATION | Facility: HOSPITAL | Age: 23
End: 2023-01-09

## 2023-01-09 NOTE — TELEPHONE ENCOUNTER
Called regarding today's No Show, left a message on her voicemail reminding her that her next appointment is on 1/19/23 @ 8:00.

## 2023-01-19 ENCOUNTER — CLINICAL SUPPORT (OUTPATIENT)
Dept: REHABILITATION | Facility: HOSPITAL | Age: 23
End: 2023-01-19
Attending: STUDENT IN AN ORGANIZED HEALTH CARE EDUCATION/TRAINING PROGRAM
Payer: MEDICAID

## 2023-01-19 DIAGNOSIS — Z74.09 DECREASED FUNCTIONAL MOBILITY AND ENDURANCE: Primary | ICD-10-CM

## 2023-01-19 PROCEDURE — 97110 THERAPEUTIC EXERCISES: CPT | Mod: PN,CQ

## 2023-01-19 NOTE — PATIENT INSTRUCTIONS
Knee  (LAQ)        Sit up tall, tighten abdominals. Straighten one leg and then relax it. Repeat with other leg.  Repeat 10 times. Do 2 sessions per day.     [email or web address]     Hamstring Curl: Resisted (Sitting)        Facing anchor with tubing on right ankle, leg straight out, bend knee.  Repeat 10 times per set. Do 1 sets per session. Do 2 sessions per day.      [email or web address]

## 2023-01-19 NOTE — PROGRESS NOTES
"[facility]   Physical Therapy Treatment Note     Name: Cydney Patel  Clinic Number: [id number]    Therapy Diagnosis:   Encounter Diagnosis   Name Primary?    Decreased functional mobility and endurance Yes     Physician: Crow Camacho MD    Visit Date: 1/19/2023    Physician Orders: PT Eval and Treat   Medical Diagnosis from Referral: M76.32 (ICD-10-CM) - It band syndrome, left  Evaluation Date: 1/4/2023  Authorization Period Expiration: 10/21/23  Plan of Care Expiration: 2/23/23  Progress Note Due: 2/23/23  Visit # / Visits authorized: 1/ 1, 1/20    FOTO: 2/ 10   PTA Visit #: 1/5     Time In: 8:00 am  Time Out: 8:55 am  Total Billable Time: 55 minutes (TE-4)     Precautions: Standard; NO CUPPING (per patient request)    SUBJECTIVE     Pt reports: having her normal pain level in her left knee, 3/10.  She was compliant with home exercise program.  Response to previous treatment: no problems.  Functional change: not at this time.    Pain: 3/10  Location: left knee      OBJECTIVE     Objective Measures updated at progress report unless specified.     Treatment     Cydney received the treatments listed below:      therapeutic exercises to develop strength, endurance, ROM, flexibility, posture, and core stabilization for 55 minutes including:    Supine hamstring stretch: 3x30" Bilateral   Supine iliotibial band stretch: 3x30" Bilateral   Quad set with towel roll under ankle: 5"x15 Bilateral   Straight leg raise: 3x10 Bilateral   Hip abduction straight leg raise: x10 Bilateral   Clamshells: 5"x10 Bilateral     Triple flexion: x10 Bilateral   Long arc quad: 2x10   Theraband hamstring curls: 2x10 red theraband Bilateral      Leg Press: double leg: 3 plates 3x10 sled 5       Patient Education and Home Exercises     Home Exercises Provided and Patient Education Provided     Education provided:   - keep all exercises in a pain free range.    Written Home Exercises Provided: yes. Exercises " were reviewed and Cydney was able to demonstrate them prior to the end of the session.  Cyndey demonstrated good  understanding of the education provided. See EMR under Patient Instructions for exercises provided during therapy sessions    ASSESSMENT     Cydney is a 22 y.o. female referred to outpatient Physical Therapy with a medical diagnosis of left iliotibial band syndrome. Patient was only able to perform one set of ten of side lying hip abduction and clams due to weakness and fatigue.      Cydney Is progressing well towards her goals.   Pt prognosis is Good.     Pt will continue to benefit from skilled outpatient physical therapy to address the deficits listed in the problem list box on initial evaluation, provide pt/family education and to maximize pt's level of independence in the home and community environment.     Pt's spiritual, cultural and educational needs considered and pt agreeable to plan of care and goals.     Anticipated barriers to physical therapy: busy work schedule/type of work.     Goals:  Short Term Goals: 3 weeks   1.  Patient will demonstrate Bilateral hamstring strength via MicroFET > 8 kg to improve functional mobility.  2.  Patient will demonstrate left quad strength via MicroFET of > 14 kg to improve transfers, gait and ADL performance.  3.  Patient will report worst Bilateral knee pain < 5/10.     Long Term Goals: 7 weeks   1.  Patient will demonstrate ability to perform > 13 sit <> stand transfers w/o UE use for improved ability to stand from low surfaces.  2.  Patient will demonstrate ability to negotiate a flight of stairs with 0 HR and reciprocal pattern.  3.  Patient will demonstrate to PT comprehensive understanding of each HEP component without verbal cueing.   4.  Patient will improve FOTO to < 31% to improve ADL performance.    PLAN     Continue with Plan Of Care and progress toward therapist goals.     Abilio Dupree, PTA

## 2023-01-20 ENCOUNTER — CLINICAL SUPPORT (OUTPATIENT)
Dept: REHABILITATION | Facility: HOSPITAL | Age: 23
End: 2023-01-20
Attending: STUDENT IN AN ORGANIZED HEALTH CARE EDUCATION/TRAINING PROGRAM
Payer: MEDICAID

## 2023-01-20 DIAGNOSIS — Z74.09 DECREASED FUNCTIONAL MOBILITY AND ENDURANCE: Primary | ICD-10-CM

## 2023-01-20 PROCEDURE — 97110 THERAPEUTIC EXERCISES: CPT | Mod: PN | Performed by: PHYSICAL THERAPIST

## 2023-01-20 NOTE — PROGRESS NOTES
"[facility]   Physical Therapy Treatment Note     Name: Cydney Patel  Clinic Number: [id number]    Therapy Diagnosis:   Encounter Diagnosis   Name Primary?    Decreased functional mobility and endurance Yes     Physician: Crow Camacho MD    Visit Date: 1/20/2023    Physician Orders: PT Eval and Treat   Medical Diagnosis from Referral: M76.32 (ICD-10-CM) - It band syndrome, left  Evaluation Date: 1/4/2023  Authorization Period Expiration: 10/21/23  Plan of Care Expiration: 2/23/23  Progress Note Due: 2/23/23  Visit # / Visits authorized: 1/ 1, 2/20    FOTO: 3/ 10   PTA Visit #: 0/5     Time In: 8:03 am  Time Out: 8:58 am  Total Billable Time: 55 minutes (TE-4)     Precautions: Standard; NO CUPPING (per patient request)    SUBJECTIVE     Pt reports: she got set up with a meal prep  who will progress into more strengthening as PT ends. More difficulty and soreness with left clamshells.  She was compliant with home exercise program.  Response to previous treatment: no problems.  Functional change: not at this time.    Pain: 3/10  Location: left knee      OBJECTIVE     Objective Measures updated at progress report unless specified.     Treatment     Cydney received the treatments listed below:      therapeutic exercises to develop strength, endurance, ROM, flexibility, posture, and core stabilization for 55 minutes including:    Recumbent bike: 5 minutes level 3 (DC bike and change to Nustep)  Supine hamstring stretch: 3x30" Bilateral   Supine iliotibial band stretch: 3x30" Bilateral   Quad set with towel roll under ankle: 5"x15 Bilateral   Straight leg raise: 3x10 Bilateral   Hip abduction straight leg raise: 2x10 Bilateral   Clamshells: 5"x20 Bilateral  Bridges: 2x10 with 3" holds with blue theraband around knees     Supine Triple flexion with physioball: x20 Bilateral maroon    Next: (OOT)  Long arc quad: 2x10   Theraband hamstring curls: 2x10 red theraband Bilateral      Leg " Press: double leg: 3 plates 3x10 sled 5       Patient Education and Home Exercises     Home Exercises Provided and Patient Education Provided     Education provided:   - keep all exercises in a pain free range.    Written Home Exercises Provided: yes. Exercises were reviewed and Cydney was able to demonstrate them prior to the end of the session.  Cydney demonstrated good  understanding of the education provided. See EMR under Patient Instructions for exercises provided during therapy sessions    ASSESSMENT     Cydney is a 22 y.o. female referred to outpatient Physical Therapy with a medical diagnosis of left iliotibial band syndrome. Left > right knee pain with corresponding left > right lateral hip weakness and difficulty with exercises. She is taking the right steps outside of PT by utilizing a  for nutritional consult and assistance with her home exercise program.     Cydney Is progressing well towards her goals.   Pt prognosis is Good.     Pt will continue to benefit from skilled outpatient physical therapy to address the deficits listed in the problem list box on initial evaluation, provide pt/family education and to maximize pt's level of independence in the home and community environment.     Pt's spiritual, cultural and educational needs considered and pt agreeable to plan of care and goals.     Anticipated barriers to physical therapy: busy work schedule/type of work.     Goals:  Short Term Goals: 3 weeks   1.  Patient will demonstrate Bilateral hamstring strength via MicroFET > 8 kg to improve functional mobility. PROGRESSING; NOT MET  2.  Patient will demonstrate left quad strength via MicroFET of > 14 kg to improve transfers, gait and ADL performance. PROGRESSING; NOT MET  3.  Patient will report worst Bilateral knee pain < 5/10. PROGRESSING; NOT MET     Long Term Goals: 7 weeks   1.  Patient will demonstrate ability to perform > 13 sit <> stand transfers w/o UE use for improved ability to stand  from low surfaces. PROGRESSING; NOT MET  2.  Patient will demonstrate ability to negotiate a flight of stairs with 0 HR and reciprocal pattern. PROGRESSING; NOT MET  3.  Patient will demonstrate to PT comprehensive understanding of each HEP component without verbal cueing.  PROGRESSING; NOT MET  4.  Patient will improve FOTO to < 31% to improve ADL performance. PROGRESSING; NOT MET    PLAN     Continue with Plan Of Care and progress toward therapist goals.   Concentration on lateral hip and quad strength    Adilson Maria, PT

## 2023-01-25 ENCOUNTER — OFFICE VISIT (OUTPATIENT)
Dept: URGENT CARE | Facility: CLINIC | Age: 23
End: 2023-01-25
Payer: MEDICAID

## 2023-01-25 VITALS
SYSTOLIC BLOOD PRESSURE: 108 MMHG | RESPIRATION RATE: 17 BRPM | WEIGHT: 101.19 LBS | BODY MASS INDEX: 18.62 KG/M2 | HEIGHT: 62 IN | TEMPERATURE: 98 F | DIASTOLIC BLOOD PRESSURE: 66 MMHG | HEART RATE: 70 BPM | OXYGEN SATURATION: 98 %

## 2023-01-25 DIAGNOSIS — H66.001 NON-RECURRENT ACUTE SUPPURATIVE OTITIS MEDIA OF RIGHT EAR WITHOUT SPONTANEOUS RUPTURE OF TYMPANIC MEMBRANE: Primary | ICD-10-CM

## 2023-01-25 DIAGNOSIS — J01.90 ACUTE NON-RECURRENT SINUSITIS, UNSPECIFIED LOCATION: ICD-10-CM

## 2023-01-25 PROCEDURE — 1159F PR MEDICATION LIST DOCUMENTED IN MEDICAL RECORD: ICD-10-PCS | Mod: CPTII,S$GLB,,

## 2023-01-25 PROCEDURE — 3008F BODY MASS INDEX DOCD: CPT | Mod: CPTII,S$GLB,,

## 2023-01-25 PROCEDURE — 3078F DIAST BP <80 MM HG: CPT | Mod: CPTII,S$GLB,,

## 2023-01-25 PROCEDURE — 1160F RVW MEDS BY RX/DR IN RCRD: CPT | Mod: CPTII,S$GLB,,

## 2023-01-25 PROCEDURE — 3078F PR MOST RECENT DIASTOLIC BLOOD PRESSURE < 80 MM HG: ICD-10-PCS | Mod: CPTII,S$GLB,,

## 2023-01-25 PROCEDURE — 3074F SYST BP LT 130 MM HG: CPT | Mod: CPTII,S$GLB,,

## 2023-01-25 PROCEDURE — 1160F PR REVIEW ALL MEDS BY PRESCRIBER/CLIN PHARMACIST DOCUMENTED: ICD-10-PCS | Mod: CPTII,S$GLB,,

## 2023-01-25 PROCEDURE — 3074F PR MOST RECENT SYSTOLIC BLOOD PRESSURE < 130 MM HG: ICD-10-PCS | Mod: CPTII,S$GLB,,

## 2023-01-25 PROCEDURE — 99213 OFFICE O/P EST LOW 20 MIN: CPT | Mod: S$GLB,,,

## 2023-01-25 PROCEDURE — 1159F MED LIST DOCD IN RCRD: CPT | Mod: CPTII,S$GLB,,

## 2023-01-25 PROCEDURE — 99213 PR OFFICE/OUTPT VISIT, EST, LEVL III, 20-29 MIN: ICD-10-PCS | Mod: S$GLB,,,

## 2023-01-25 PROCEDURE — 3008F PR BODY MASS INDEX (BMI) DOCUMENTED: ICD-10-PCS | Mod: CPTII,S$GLB,,

## 2023-01-25 RX ORDER — FLUTICASONE PROPIONATE 50 MCG
1 SPRAY, SUSPENSION (ML) NASAL DAILY
Qty: 9.9 ML | Refills: 0 | Status: SHIPPED | OUTPATIENT
Start: 2023-01-25 | End: 2023-06-14

## 2023-01-25 RX ORDER — AMOXICILLIN AND CLAVULANATE POTASSIUM 400; 57 MG/5ML; MG/5ML
875 POWDER, FOR SUSPENSION ORAL EVERY 12 HOURS
Qty: 153 ML | Refills: 0 | Status: SHIPPED | OUTPATIENT
Start: 2023-01-25 | End: 2023-02-01

## 2023-01-25 NOTE — PROGRESS NOTES
"Subjective:       Patient ID: Cydney Patel is a 22 y.o. female.    Vitals:  height is 5' 2" (1.575 m) and weight is 45.9 kg (101 lb 3.1 oz). Her temperature is 97.7 °F (36.5 °C). Her blood pressure is 108/66 and her pulse is 70. Her respiration is 17 and oxygen saturation is 98%.     Chief Complaint: Otalgia    Patient presents to clinic with right ear pain x 1 day.  Patient states there is a piercing stabbing pain.  Patient has tried warm compresses/hydrogen peroxide and 2 ibuprofen. Patient reports that she was seen at an urgent care last week and diagnosed with a viral illness but is still having congestion and sinus pressure as well. Patient was tested for COVID and flu and was negative. Pt requests liquid for any medications because she has a hard time swallowing pills.    Otalgia   There is pain in the right ear. This is a new problem. The current episode started today. The problem occurs constantly. The problem has been gradually worsening. There has been no fever. The pain is at a severity of 8/10. The pain is severe. Associated symptoms include hearing loss. Pertinent negatives include no coughing, headaches or sore throat. She has tried acetaminophen (warm compress/hydrogen peroxide) for the symptoms. The treatment provided mild relief.     HENT:  Positive for ear pain, hearing loss, congestion and sinus pressure. Negative for postnasal drip and sore throat.    Respiratory:  Negative for cough.    Neurological:  Negative for headaches.     Objective:      Physical Exam   Constitutional: She is oriented to person, place, and time. She appears well-developed. She is cooperative.  Non-toxic appearance. She does not appear ill. No distress.   HENT:   Head: Normocephalic and atraumatic.   Ears:   Right Ear: Hearing, tympanic membrane, external ear and ear canal normal.   Left Ear: Hearing, external ear and ear canal normal. Tympanic membrane is injected and erythematous.   Nose: Congestion present. No " mucosal edema, rhinorrhea or nasal deformity. No epistaxis. Right sinus exhibits maxillary sinus tenderness. Right sinus exhibits no frontal sinus tenderness. Left sinus exhibits maxillary sinus tenderness. Left sinus exhibits no frontal sinus tenderness.   Mouth/Throat: Uvula is midline, oropharynx is clear and moist and mucous membranes are normal. No trismus in the jaw. Normal dentition. No uvula swelling. No oropharyngeal exudate, posterior oropharyngeal edema or posterior oropharyngeal erythema.   Eyes: Conjunctivae and lids are normal. No scleral icterus.   Neck: Trachea normal and phonation normal. Neck supple. No edema present. No erythema present. No neck rigidity present.   Cardiovascular: Normal rate, regular rhythm, normal heart sounds and normal pulses.   Pulmonary/Chest: Effort normal and breath sounds normal. No respiratory distress. She has no decreased breath sounds. She has no rhonchi.   Abdominal: Normal appearance.   Musculoskeletal: Normal range of motion.         General: No deformity. Normal range of motion.   Neurological: She is alert and oriented to person, place, and time. She exhibits normal muscle tone. Coordination normal.   Skin: Skin is warm, dry, intact, not diaphoretic and not pale.   Psychiatric: Her speech is normal and behavior is normal. Judgment and thought content normal.   Nursing note and vitals reviewed.      Assessment:       1. Non-recurrent acute suppurative otitis media of right ear without spontaneous rupture of tympanic membrane    2. Acute non-recurrent sinusitis, unspecified location        Plan:       Non-recurrent acute suppurative otitis media of right ear without spontaneous rupture of tympanic membrane  -     amoxicillin-clavulanate (AUGMENTIN) 400-57 mg/5 mL SusR; Take 10.9 mLs (872 mg total) by mouth every 12 (twelve) hours. for 7 days  Dispense: 153 mL; Refill: 0  -     fluticasone propionate (FLONASE) 50 mcg/actuation nasal spray; 1 spray (50 mcg total) by  "Each Nostril route once daily.  Dispense: 9.9 mL; Refill: 0    Acute non-recurrent sinusitis, unspecified location  -     amoxicillin-clavulanate (AUGMENTIN) 400-57 mg/5 mL SusR; Take 10.9 mLs (872 mg total) by mouth every 12 (twelve) hours. for 7 days  Dispense: 153 mL; Refill: 0  -     fluticasone propionate (FLONASE) 50 mcg/actuation nasal spray; 1 spray (50 mcg total) by Each Nostril route once daily.  Dispense: 9.9 mL; Refill: 0      Patient Instructions   Ear Infection:  Take full course of antibiotics as prescribed.  Humidifier use at home.  Warm compresses to affected ear  Elevate head on a pillow at night   Flonase Nasal Spray as directed for nasal congestion  Over the Counter Claritin or Zyrtec (plain) as directed for allergy symptoms  Tylenol or Motrin every 4 - 6 hours as needed for fever or ear pain.  Follow up with your PCP in 1 week of initiating antibiotics or sooner for no improvement in symptoms  Follow up in the ER for any worsening of symptoms such as new fever, increasing ear pain, neck stiffness, shortness of breath, etc.  If you smoke, stop smoking.                                                              Sinusitis   If your condition worsens or fails to improve we recommend that you receive another evaluation at the urgent care/ER immediately or contact your PCP to discuss your concerns. You must understand that you've received an urgent care treatment only and that you may be released before all your medical problems are known or treated. You the patient will arrange for followup care as instructed.     Please take antibiotics to completion.    Flonase (fluticasone) is a nasal spray which is available over the counter and may help with your symptoms     Zyrtec D, Claritin D or allegra D can also help with symptoms of congestion and drainage.     If you have hypertension avoid using the "D" which is the decongestant     If you just have drainage you can take plain zyrtec, claritin or " allegra   If you just have a congested feeling you can take pseudoephedrine (unless you have high blood pressure) which you have to sign for behind the counter. Do not buy the phenylephrine which is on the shelf as it is not effective     Rest and fluids are also important.     Tylenol or ibuprofen can also be used as directed for pain unless you have an allergy to them or medical condition such as stomach ulcers, kidney or liver disease or blood thinners etc for which you should not be taking these type of medications.     If you are flying in the next few days Afrin nose drops for the airplane flight upon take off and landing may help. Other than at those times refrain from using afrin.

## 2023-01-25 NOTE — PROGRESS NOTES
Subjective:       Patient ID: Cydney Patel is a 22 y.o. female.    Vitals:  vitals were not taken for this visit.     Chief Complaint: Otalgia    Patient complains of severe ear pain  Symptoms  ***     Otalgia     HENT:  Positive for ear pain.      Objective:      Physical Exam      Assessment:       No diagnosis found.      Plan:         There are no diagnoses linked to this encounter.

## 2023-01-25 NOTE — PATIENT INSTRUCTIONS
"Ear Infection:  Take full course of antibiotics as prescribed.  Humidifier use at home.  Warm compresses to affected ear  Elevate head on a pillow at night   Flonase Nasal Spray as directed for nasal congestion  Over the Counter Claritin or Zyrtec (plain) as directed for allergy symptoms  Tylenol or Motrin every 4 - 6 hours as needed for fever or ear pain.  Follow up with your PCP in 1 week of initiating antibiotics or sooner for no improvement in symptoms  Follow up in the ER for any worsening of symptoms such as new fever, increasing ear pain, neck stiffness, shortness of breath, etc.  If you smoke, stop smoking.                                                              Sinusitis   If your condition worsens or fails to improve we recommend that you receive another evaluation at the urgent care/ER immediately or contact your PCP to discuss your concerns. You must understand that you've received an urgent care treatment only and that you may be released before all your medical problems are known or treated. You the patient will arrange for followup care as instructed.     Please take antibiotics to completion.    Flonase (fluticasone) is a nasal spray which is available over the counter and may help with your symptoms     Zyrtec D, Claritin D or allegra D can also help with symptoms of congestion and drainage.     If you have hypertension avoid using the "D" which is the decongestant     If you just have drainage you can take plain zyrtec, claritin or allegra   If you just have a congested feeling you can take pseudoephedrine (unless you have high blood pressure) which you have to sign for behind the counter. Do not buy the phenylephrine which is on the shelf as it is not effective     Rest and fluids are also important.     Tylenol or ibuprofen can also be used as directed for pain unless you have an allergy to them or medical condition such as stomach ulcers, kidney or liver disease or blood thinners etc for " which you should not be taking these type of medications.     If you are flying in the next few days Afrin nose drops for the airplane flight upon take off and landing may help. Other than at those times refrain from using afrin.

## 2023-01-31 ENCOUNTER — PATIENT MESSAGE (OUTPATIENT)
Dept: OBSTETRICS AND GYNECOLOGY | Facility: CLINIC | Age: 23
End: 2023-01-31
Payer: MEDICAID

## 2023-02-06 NOTE — PROGRESS NOTES
"[facility]   Physical Therapy Treatment Note     Name: Cydney Patel  Clinic Number: [id number]    Therapy Diagnosis:   Encounter Diagnosis   Name Primary?    Decreased functional mobility and endurance Yes     Physician: Crow Camacho MD    Visit Date: 2/7/2023    Physician Orders: PT Eval and Treat   Medical Diagnosis from Referral: M76.32 (ICD-10-CM) - It band syndrome, left  Evaluation Date: 1/4/2023  Authorization Period Expiration: 10/21/23  Plan of Care Expiration: 2/23/23  Progress Note Due: 2/23/23  Visit # / Visits authorized: 1/ 1, 3/20    FOTO: 4/ 10   PTA Visit #: 1/5     Time In: 8:00 am  Time Out: 8:58 am  Total Billable Time: 58 minutes (TE-4)     Precautions: Standard; NO CUPPING (per patient request)    SUBJECTIVE     Pt reports: Patient reports static standing causing pain starting at 5 minutes, Pain still 10/10 at worse. Patient admits to not sticking to any one thing consistently (including physical therapy) in order to see if something makes knee better.   She was compliant with home exercise program.  Response to previous treatment: no problems.  Functional change: not at this time.    Pain: 3/10  Location: left knee      OBJECTIVE     Objective Measures updated at progress report unless specified.     Treatment     Cydney received the treatments listed below:      therapeutic exercises to develop strength, endurance, ROM, flexibility, posture, and core stabilization for 55 minutes including:      Supine hamstring stretch: 3x30" Bilateral   Straight leg raise: 3x10 Bilateral   Hip abduction straight leg raise: 3x10 Bilateral   Clamshells: 5"x20 Right     Backwards Walking on TM (off) 1' x3   Frankenstein walks 3 Laps (Dynmaic HS stretch)   Squats with Black TB to assist in Concentric Phase 2x10  LAQ NOHEMI with Black TB 5x20"     Previous:   Recumbent bike: 5 minutes level 3 (DC bike and change to Nustep)  Supine iliotibial band stretch: 3x30" Bilateral   Quad " "set with towel roll under ankle: 5"x15 Bilateral   Bridges: 2x10 with 3" holds with blue theraband around knees   Seated Knee extension NOHEMI with Black TB 5x20"   Front plank on elbows 3x30"     Next: (OOT)  Supine Triple flexion with physioball: x20 Bilateral maroon  Theraband hamstring curls: 2x10 red theraband Bilateral      Leg Press: double leg: 3 plates 3x10 sled 5       Patient Education and Home Exercises     Home Exercises Provided and Patient Education Provided     Education provided:   - keep all exercises in a pain free range.    Written Home Exercises Provided: yes. Exercises were reviewed and Cydney was able to demonstrate them prior to the end of the session.  Cydney demonstrated good  understanding of the education provided. See EMR under Patient Instructions for exercises provided during therapy sessions    ASSESSMENT     Cydney is a 22 y.o. female referred to outpatient Physical Therapy with a medical diagnosis of left iliotibial band syndrome. Patient ed on importance of consistent performance of physical therapy exercises to progress towards goals. Patient amb with LLE flexed and PTA palpated HS as both taught and tense. Tx emphasis on facilitation of quads and knee extension ROM/Strength as well as inhibition of hamstrings. Patient tolerates tx very well reporting some R distal ITB pain in concentric squat which was resolved after R sided clams. Patient responds well to tx stating knees feel much more loose post tx and feels very worked out.     Cydney Is progressing well towards her goals.   Pt prognosis is Good.     Pt will continue to benefit from skilled outpatient physical therapy to address the deficits listed in the problem list box on initial evaluation, provide pt/family education and to maximize pt's level of independence in the home and community environment.     Pt's spiritual, cultural and educational needs considered and pt agreeable to plan of care and goals.     Anticipated " barriers to physical therapy: busy work schedule/type of work.     Goals:  Short Term Goals: 3 weeks   1.  Patient will demonstrate Bilateral hamstring strength via MicroFET > 8 kg to improve functional mobility. PROGRESSING; NOT MET  2.  Patient will demonstrate left quad strength via MicroFET of > 14 kg to improve transfers, gait and ADL performance. PROGRESSING; NOT MET  3.  Patient will report worst Bilateral knee pain < 5/10. PROGRESSING; NOT MET     Long Term Goals: 7 weeks   1.  Patient will demonstrate ability to perform > 13 sit <> stand transfers w/o UE use for improved ability to stand from low surfaces. PROGRESSING; NOT MET  2.  Patient will demonstrate ability to negotiate a flight of stairs with 0 HR and reciprocal pattern. PROGRESSING; NOT MET  3.  Patient will demonstrate to PT comprehensive understanding of each HEP component without verbal cueing.  PROGRESSING; NOT MET  4.  Patient will improve FOTO to < 31% to improve ADL performance. PROGRESSING; NOT MET    PLAN     Continue with Plan Of Care and progress toward therapist goals.   Concentration on lateral hip and quad strength    Jamey Buchanan, PTA

## 2023-02-07 ENCOUNTER — CLINICAL SUPPORT (OUTPATIENT)
Dept: REHABILITATION | Facility: HOSPITAL | Age: 23
End: 2023-02-07
Payer: MEDICAID

## 2023-02-07 DIAGNOSIS — Z74.09 DECREASED FUNCTIONAL MOBILITY AND ENDURANCE: Primary | ICD-10-CM

## 2023-02-07 PROCEDURE — 97110 THERAPEUTIC EXERCISES: CPT | Mod: PN,CQ

## 2023-02-24 ENCOUNTER — OFFICE VISIT (OUTPATIENT)
Dept: FAMILY MEDICINE | Facility: HOSPITAL | Age: 23
End: 2023-02-24
Payer: MEDICAID

## 2023-02-24 VITALS
DIASTOLIC BLOOD PRESSURE: 87 MMHG | WEIGHT: 100.06 LBS | HEIGHT: 62 IN | BODY MASS INDEX: 18.41 KG/M2 | SYSTOLIC BLOOD PRESSURE: 95 MMHG | HEART RATE: 95 BPM

## 2023-02-24 DIAGNOSIS — R14.0 BLOATING: Primary | ICD-10-CM

## 2023-02-24 PROCEDURE — 99213 OFFICE O/P EST LOW 20 MIN: CPT | Performed by: STUDENT IN AN ORGANIZED HEALTH CARE EDUCATION/TRAINING PROGRAM

## 2023-02-24 RX ORDER — DICYCLOMINE HYDROCHLORIDE 10 MG/1
10 CAPSULE ORAL 2 TIMES DAILY WITH MEALS
Qty: 60 CAPSULE | Refills: 0 | Status: SHIPPED | OUTPATIENT
Start: 2023-02-24 | End: 2023-03-26

## 2023-02-24 NOTE — PROGRESS NOTES
[facility]  History & Physical    SUBJECTIVE:     Chief Complaint:   Chief Complaint   Patient presents with    GI Problem     Frequent formed soft stools, bloated       History of Present Illness:  22 y.o. female who  has a past medical history of Allergy, Bipolar affective, Depression, Headache(784.0), History of psychiatric hospitalization, Oppositional defiant disorder, Panic attacks, Self-harming behavior, and Suicide attempt. presents to clinic today for increased bowel movements and bloating. Patient reports movements have increased to twice per day from previous once every other day. Patient also describes feeling of bloating and intestinal discomfort through out the day. Patient reports starting a cleanse recently that adds a significant amount of water to her diet. Denies nausea, vomiting, fatigue, and fever.       Allergies:  Review of patient's allergies indicates:   Allergen Reactions    No known drug allergies        Home Medications:  Current Outpatient Medications on File Prior to Visit   Medication Sig    busPIRone (BUSPAR) 15 MG tablet Take 1 tablet po q am    busPIRone (BUSPAR) 30 MG Tab Take 1 tablet po q hs    fluticasone propionate (FLONASE) 50 mcg/actuation nasal spray 1 spray (50 mcg total) by Each Nostril route once daily.    lamoTRIgine (LAMICTAL) 25 MG tablet Take 1 tablet po q am and 2 tablets po q hs     Current Facility-Administered Medications on File Prior to Visit   Medication    etonogestreL subdermal device 68 mg       Past Medical History:   Diagnosis Date    Allergy     Bipolar affective     Depression     Headache(784.0)     migraine    History of psychiatric hospitalization     Oppositional defiant disorder     Panic attacks     Self-harming behavior     Suicide attempt      Past Surgical History:   Procedure Laterality Date    DILATION AND CURETTAGE OF UTERUS  10/25/2021    termination of pregnancy    INSERTION OF CONTRACEPTIVE CAPSULE  2022     Family History    Problem Relation Age of Onset    Stroke Father 43        unknown cause    Hypertension Father     Anxiety disorder Father     ADD / ADHD Sister     Migraines Mother     Anxiety disorder Mother     Heart attack Maternal Grandmother     Migraines Maternal Grandmother     Heart attack Maternal Grandfather     Stroke Paternal Grandmother     Heart attack Paternal Grandmother     Stroke Paternal Grandfather     Diabetes Neg Hx     Early death Neg Hx     Breast cancer Neg Hx     Colon cancer Neg Hx     Ovarian cancer Neg Hx      Social History     Tobacco Use    Smoking status: Every Day     Types: Vaping with nicotine    Smokeless tobacco: Current   Substance Use Topics    Alcohol use: Yes     Comment: socially    Drug use: No        Review of Systems   Constitutional:  Negative for fever and weight loss.   HENT:  Negative for hearing loss and sore throat.    Eyes:  Negative for blurred vision.   Respiratory:  Negative for cough, shortness of breath and wheezing.    Cardiovascular:  Negative for chest pain and leg swelling.   Gastrointestinal:  Positive for abdominal pain. Negative for heartburn, nausea and vomiting.   Genitourinary:  Negative for dysuria.   Musculoskeletal:  Negative for back pain, joint pain and myalgias.   Neurological:  Negative for dizziness, weakness and headaches.      OBJECTIVE:     Vital Signs:  Pulse: 95 (02/24/23 1026)  BP: 95/87 (02/24/23 1026)  Body mass index is 18.31 kg/m².  Physical Exam  HENT:      Head: Normocephalic.      Right Ear: External ear normal.      Left Ear: External ear normal.      Nose: Nose normal.      Mouth/Throat:      Mouth: Mucous membranes are moist.   Eyes:      Extraocular Movements: Extraocular movements intact.      Pupils: Pupils are equal, round, and reactive to light.   Cardiovascular:      Rate and Rhythm: Normal rate.      Pulses: Normal pulses.   Pulmonary:      Effort: Pulmonary effort is normal.   Abdominal:      Palpations: Abdomen is soft.       Tenderness: There is no abdominal tenderness.   Musculoskeletal:      Cervical back: Normal range of motion.   Skin:     General: Skin is warm.      Capillary Refill: Capillary refill takes less than 2 seconds.   Neurological:      General: No focal deficit present.      Mental Status: She is alert and oriented to person, place, and time.       Laboratory:  No results found for: HGBA1C    A/P:  Cydney was seen today for gi problem. Increased hydration has improved stool transit. Stools are not loose. Patient discomfort may be due to gi spasm ns setting of increased motility. Bentyl provided while adjusting to new bowel habits. Encouraged a diet balanced between protein, complex carbohydrates green vegetables and fruit.    Diagnoses and all orders for this visit:    Bloating  -     dicyclomine (BENTYL) 10 MG capsule; Take 1 capsule (10 mg total) by mouth 2 (two) times daily with meals.        Follow up in about 3 months (around 5/24/2023).      Crow Camacho MD  [facility] PGY-2  02/27/2023

## 2023-03-03 ENCOUNTER — PATIENT MESSAGE (OUTPATIENT)
Dept: PSYCHIATRY | Facility: CLINIC | Age: 23
End: 2023-03-03
Payer: MEDICAID

## 2023-03-08 ENCOUNTER — DOCUMENTATION ONLY (OUTPATIENT)
Dept: REHABILITATION | Facility: HOSPITAL | Age: 23
End: 2023-03-08
Payer: MEDICAID

## 2023-03-08 PROBLEM — Z74.09 DECREASED FUNCTIONAL MOBILITY AND ENDURANCE: Status: RESOLVED | Noted: 2023-01-04 | Resolved: 2023-03-08

## 2023-03-08 NOTE — PROGRESS NOTES
PATIENT DISCHARGE:  Patient has broken no show/cancellation policy and cancelled her final visit. She only attended 4 PT visits, with last over 1 month ago. She will be dc'd at this time.

## 2023-04-04 ENCOUNTER — OFFICE VISIT (OUTPATIENT)
Dept: PSYCHIATRY | Facility: CLINIC | Age: 23
End: 2023-04-04
Payer: MEDICAID

## 2023-04-04 DIAGNOSIS — F41.9 ANXIETY: ICD-10-CM

## 2023-04-04 DIAGNOSIS — F31.70 BIPOLAR DISORDER IN PARTIAL REMISSION, MOST RECENT EPISODE UNSPECIFIED TYPE: Primary | ICD-10-CM

## 2023-04-04 DIAGNOSIS — F60.3 BORDERLINE PERSONALITY DISORDER: ICD-10-CM

## 2023-04-04 DIAGNOSIS — F12.10 MARIJUANA ABUSE: ICD-10-CM

## 2023-04-04 PROCEDURE — 1160F RVW MEDS BY RX/DR IN RCRD: CPT | Mod: SA,HB,CPTII,95 | Performed by: NURSE PRACTITIONER

## 2023-04-04 PROCEDURE — 1160F PR REVIEW ALL MEDS BY PRESCRIBER/CLIN PHARMACIST DOCUMENTED: ICD-10-PCS | Mod: SA,HB,CPTII,95 | Performed by: NURSE PRACTITIONER

## 2023-04-04 PROCEDURE — 99214 PR OFFICE/OUTPT VISIT, EST, LEVL IV, 30-39 MIN: ICD-10-PCS | Mod: SA,HB,95, | Performed by: NURSE PRACTITIONER

## 2023-04-04 PROCEDURE — 1159F PR MEDICATION LIST DOCUMENTED IN MEDICAL RECORD: ICD-10-PCS | Mod: SA,HB,CPTII,95 | Performed by: NURSE PRACTITIONER

## 2023-04-04 PROCEDURE — 99214 OFFICE O/P EST MOD 30 MIN: CPT | Mod: SA,HB,95, | Performed by: NURSE PRACTITIONER

## 2023-04-04 PROCEDURE — 1159F MED LIST DOCD IN RCRD: CPT | Mod: SA,HB,CPTII,95 | Performed by: NURSE PRACTITIONER

## 2023-04-04 RX ORDER — LAMOTRIGINE 25 MG/1
TABLET ORAL
Qty: 60 TABLET | Refills: 3 | Status: SHIPPED | OUTPATIENT
Start: 2023-04-04 | End: 2023-08-21 | Stop reason: SDUPTHER

## 2023-04-04 RX ORDER — BUSPIRONE HYDROCHLORIDE 30 MG/1
TABLET ORAL
Qty: 30 TABLET | Refills: 3 | Status: SHIPPED | OUTPATIENT
Start: 2023-04-04 | End: 2023-08-29

## 2023-04-04 NOTE — PROGRESS NOTES
"Outpatient Psychiatry Follow-Up Visit (MD/NP) - Telemedicine Visit    4/4/2023 10:14 AM  Cydney Patel  2000  [id number]      The patient location is: Patient reported that her location at the time of this visit was in the [facility]. States she is in a parked car while she is waiting for her sister and mother who are in the store.       Visit type: audiovisual    Each patient to whom he or she provides medical services by telemedicine is:  (1) informed of the relationship between the physician and patient and the respective role of any other health care provider with respect to management of the patient; and (2) notified that he or she may decline to receive medical services by telemedicine and may withdraw from such care at any time.        Chief Complaint:  Cydney Patel, a 23 y.o. female,who presents today for follow up of mood swings, depression, and anxiety.  Met with patient.      Interim Events/Subjective Report/Content of Current Session:     Pt was last seen by me in this department on 5/12/22. She was to return for follow up in 4-6 weeks and is following up today. She has no-showed multiple appts since her last visit.    Pt reports that the lamotrigine is working well. She is less angry and irritable. States she has changed her mind set and is trying to think more positively now. 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 She is in her own apartment now and has a better relationship with her mother. She no longer has anything to do with her son's father, although, he is allowed to see his son. She states "I did a complete 180". Describes her mood lately as "pretty upbeat".    Pt states she took herself off Cymabalta prior to August because it was making her "too manic". She denies any sxs of zechariah or hypomania today. Only takes Lamictal 50 mg q am and Buspar 30 mg q hs prn anxiety. Sometimes she forgets to take Lamictal for a couple of days. I reminded her that " if she misses 4 or more consecutive days, she will need to start back at 25 mg once a day x 2 weeks otherwise, she risks developing Rodriguez-Danilo Syndrome. She verbalized an understanding. She admits she smokes marijuana daily. Sleeping well. Appetite is good.    Pt denies any SI/HI. Denies AVH, paranoia and delusions. No objective s/sx of psychosis or zechariah.      Psychotherapy:  Target symptoms: mood swings, mood disorder  Why chosen therapy is appropriate versus another modality: relevant to diagnosis, patient responds to this modality  Outcome monitoring methods: self-report, observation  Therapeutic intervention type: supportive psychotherapy  Topics discussed/themes: building skills sets for symptom management  The patient's response to the intervention is accepting. The patient's progress toward treatment goals is good.   Duration of intervention: 10 minutes.      Psychotropic medication review  Previous Trials-  Prozac  Doxepin  Lexapro  Abilify  Vistaril  Cymbalta    Current meds-  Buspar  Lamotrigine      Review of Systems       Review of Systems   Constitutional:  Negative for chills, fever, malaise/fatigue and weight loss.   Respiratory:  Negative for shortness of breath and wheezing.    Cardiovascular:  Negative for chest pain and palpitations.   Gastrointestinal:  Negative for diarrhea and vomiting.   Musculoskeletal:  Negative for falls and myalgias.   Skin:  Negative for rash.   Neurological:  Negative for tremors, seizures and headaches.   Endo/Heme/Allergies:  Does not bruise/bleed easily.   Psychiatric/Behavioral:          See HPI     Past Medical, Family and Social History: The patient's past medical, family and social history have been reviewed and updated as appropriate within the electronic medical record - see encounter notes.    Compliance: no - see HPI    Risk Parameters:  Patient reports no suicidal ideation  Patient reports no homicidal ideation  Patient reports no self-injurious  behavior  Patient reports no violent behavior    Exam (detailed: at least 9 elements; comprehensive: all 15 elements)   Constitutional  Vitals:  Most recent vital signs, dated less than 90 days prior to this appointment, were reviewed.   There were no vitals filed for this visit.     General:  unremarkable, age appropriate, normal weight, well nourished, casually dressed, neatly groomed     Musculoskeletal  Muscle Strength/Tone:  CHAR due to virtual visit   Gait & Station:  CHAR due to virtual visit     Psychiatric      Appearance:  unremarkable, age appropriate, normal weight, well nourished, casually dressed, neatly groomed   Behavior:  normal, friendly and cooperative     Speech:  no latency; no press, spontaneous   Mood & Affect:  euthymic  mood-congruent   Thought Process:  normal and logical   Associations:  intact   Thought Content:  normal, no suicidality, no homicidality, delusions, or paranoia   Insight:  intact   Judgement: behavior is adequate to circumstances, age appropriate   Orientation:  grossly intact   Memory: intact for content of interview   Language: grossly intact   Attention Span & Concentration:  able to focus   Fund of Knowledge:  intact and appropriate to age and level of education     Medications:  Outpatient Encounter Medications as of 4/4/2023   Medication Sig Dispense Refill    busPIRone (BUSPAR) 15 MG tablet Take 1 tablet po q am 30 tablet 2    busPIRone (BUSPAR) 30 MG Tab Take 1 tablet po q hs 30 tablet 2    fluticasone propionate (FLONASE) 50 mcg/actuation nasal spray 1 spray (50 mcg total) by Each Nostril route once daily. 9.9 mL 0    lamoTRIgine (LAMICTAL) 25 MG tablet Take 1 tablet po q am and 2 tablets po q hs 90 tablet 0     Facility-Administered Encounter Medications as of 4/4/2023   Medication Dose Route Frequency Provider Last Rate Last Admin    etonogestreL subdermal device 68 mg  68 mg Implant  Charan Barrett Jr., MD   68 mg at 02/07/22 1415       Allergy:  Review of  patient's allergies indicates:   Allergen Reactions    No known drug allergies          Assessment and Diagnosis   Status/Progress: Based on the examination today, the patient's problem(s) is/are improved and well controlled.  New problems have not been presented today.   Co-morbidities are not complicating management of the primary condition.        General Impression:       ICD-10-CM ICD-9-CM   1. Bipolar disorder in partial remission, most recent episode unspecified type  F31.70 296.80   2. Borderline personality disorder  F60.3 301.83   3. Anxiety  F41.9 300.00   4. Marijuana abuse  F12.10 305.20       Diff Dx:  Bipolar d/o w/rapid cycling  Drug induced mood d/o      Intervention/Counseling/Treatment Plan     Medication Management: The risks and benefits of medication were discussed with the patient.   Continue Buspar 30 mg q hs   Continue lamotrigine 50 mg q am  Labs: reviewed most recent - pt was encouraged to have fasting labs drawn as previously ordered  Lamictal: Discussed with patient the possibility of developing Rodriguez-Danilo Syndrome (SJS) with the use of Lamictal. Pt was told to STOP taking the Lamictal immediately and to notify the clinic if any rash develops. Advised if he experiences a large, rapidly expanding, blistering/ulcering, or perioral rash to immediately go to ER or call 911. Pt was told not to use any new products that may cause a rash (ex. changing laundry detergent and soap, skin creams/lotions, perfume, etc). Also informed the pt of the most common side effects of the medication including dizziness, sedation, sleep disturbance, ataxia, somnolence, headache, blurred vision and nausea. Instructed pt that he may develop SJS if he misses more than 4 days of the medicine and will need to begin titration again at 25 mg. The patient expresses understanding of the above and displays the capacity to agree with this treatment given said understanding. Patient also agrees that, currently, the  benefits outweigh the risks and would like to pursue treatment at this time.   The treatment plan and follow up plan were reviewed with the patient.  Discussed with patient informed consent, risks vs. benefits, alternative treatments, side effect profile and the inherent unpredictability of individual responses to these treatments. The patient expresses understanding of the above and displays the capacity to agree with this current plan and had no other questions.  Encouraged Patient to keep future appointments.   Take medications as prescribed and abstain from substance abuse.   Present to ED or call 911 for SI/HI plan or intent, psychosis, or medical emergency.      Return to Clinic: 3 months, or sooner if needed        Face-to-face time with the patient:  24 minutes  Total time:  29 minutes of total time spent on the encounter, which includes face to face time and non-face to face time preparing to see the patient (eg, review of tests), Obtaining and/or reviewing separately obtained history, Documenting clinical information in the electronic or other health record, Independently interpreting results (not separately reported) and communicating results to the patient/family/caregiver, or Care coordination (not separately reported).       Keysha Maldonado, MSN, APRN, PMHNP-BC [facility]

## 2023-05-15 ENCOUNTER — HOSPITAL ENCOUNTER (EMERGENCY)
Facility: HOSPITAL | Age: 23
Discharge: HOME | End: 2023-05-15
Attending: EMERGENCY MEDICINE
Payer: MEDICAID

## 2023-05-15 VITALS
OXYGEN SATURATION: 99 % | DIASTOLIC BLOOD PRESSURE: 72 MMHG | RESPIRATION RATE: 18 BRPM | SYSTOLIC BLOOD PRESSURE: 128 MMHG | BODY MASS INDEX: 18.03 KG/M2 | WEIGHT: 98 LBS | TEMPERATURE: 98 F | HEART RATE: 88 BPM | HEIGHT: 62 IN

## 2023-05-15 DIAGNOSIS — R21 RASH: Primary | ICD-10-CM

## 2023-05-15 DIAGNOSIS — F41.9 ANXIETY: ICD-10-CM

## 2023-05-15 PROCEDURE — 99284 PR EMERGENCY DEPT VISIT,LEVEL IV: ICD-10-PCS | Mod: ,,, | Performed by: EMERGENCY MEDICINE

## 2023-05-15 PROCEDURE — 99284 EMERGENCY DEPT VISIT MOD MDM: CPT | Mod: ,,, | Performed by: EMERGENCY MEDICINE

## 2023-05-15 PROCEDURE — 93010 ELECTROCARDIOGRAM REPORT: CPT | Mod: ,,, | Performed by: INTERNAL MEDICINE

## 2023-05-15 PROCEDURE — 93005 ELECTROCARDIOGRAM TRACING: CPT

## 2023-05-15 PROCEDURE — 99283 EMERGENCY DEPT VISIT LOW MDM: CPT

## 2023-05-15 PROCEDURE — 93010 EKG 12-LEAD: ICD-10-PCS | Mod: ,,, | Performed by: INTERNAL MEDICINE

## 2023-05-15 RX ORDER — TRIAMCINOLONE ACETONIDE 1 MG/G
OINTMENT TOPICAL 2 TIMES DAILY
Qty: 15 G | Refills: 0 | Status: SHIPPED | OUTPATIENT
Start: 2023-05-15 | End: 2023-06-14

## 2023-05-15 NOTE — DISCHARGE INSTRUCTIONS
As discussed concerning rash would include blistering, noticing rash in mouth or skin sloughing off  You can follow up with PCP reassessment of rash as needed   I have prescribed a topical steroid you can apply to rash.

## 2023-05-15 NOTE — ED NOTES
"Patient states she has a rash that may be from Lamictal, noted small red area to left upper arm and mid chest area. Also reports " really bad anxiety" reports usually has fastheartbeat, shaking with anxiety.  "

## 2023-05-15 NOTE — ED NOTES
Patient identifiers verified and correct for  Ms Patel  C/C:  rash, reporting anxiety SEE NN  APPEARANCE: awake and alert in NAD. PAIN  0/10  SKIN: warm, dry small linear scratch rahul to JENNIFER, small pink color to mid chest area  MUSCULOSKELETAL: Patient moving all extremities spontaneously, no obvious swelling or deformities noted. Ambulates independently.  RESPIRATORY: Denies shortness of breath.Respirations unlabored.   CARDIAC: Denies CP, 2+ distal pulses; no peripheral edema  ABDOMEN: S/ND/NT, Denies nausea  : voids spontaneously, denies difficulty  Neurologic: AAO x 4; follows commands equal strength in all extremities; denies numbness/tingling. Denies dizziness  Denies new weakness,

## 2023-05-15 NOTE — ED PROVIDER NOTES
Encounter Date: 5/15/2023       History     Chief Complaint   Patient presents with    Panic Attack     Suffer with anxiety, denies suicidal /homicidal ideation, my med is at home, usually take vistaril      22 y/o female female PMH of allergies, migraines, panic attack, anxiety and bipolar affective disorder presents with chief complaint of new onset rash today at 11:00 a.m. while in class.  Patient reports noticing a nonpruritic rash on her right upper extremity and chest.  She became concerned as she was taking Lamictal in his aware of secondary rashes due to medication.  While in the ED waiting room it was reported that the patient became anxious.  She reports this does happen due to her anxiety and panic attacks.  She is not experiencing fever chills cough nausea or vomiting.        Review of patient's allergies indicates:   Allergen Reactions    No known drug allergies      Past Medical History:   Diagnosis Date    Allergy     Bipolar affective     Depression     Headache(784.0)     migraine    History of psychiatric hospitalization     Oppositional defiant disorder     Panic attacks     Self-harming behavior     Suicide attempt      Past Surgical History:   Procedure Laterality Date    DILATION AND CURETTAGE OF UTERUS  10/25/2021    termination of pregnancy    INSERTION OF CONTRACEPTIVE CAPSULE  2022     Family History   Problem Relation Age of Onset    Stroke Father 43        unknown cause    Hypertension Father     Anxiety disorder Father     ADD / ADHD Sister     Migraines Mother     Anxiety disorder Mother     Heart attack Maternal Grandmother     Migraines Maternal Grandmother     Heart attack Maternal Grandfather     Stroke Paternal Grandmother     Heart attack Paternal Grandmother     Stroke Paternal Grandfather     Diabetes Neg Hx     Early death Neg Hx     Breast cancer Neg Hx     Colon cancer Neg Hx     Ovarian cancer Neg Hx      Social History     Tobacco Use     Smoking status: Every Day     Types: Vaping with nicotine    Smokeless tobacco: Current   Substance Use Topics    Alcohol use: Yes     Comment: socially    Drug use: No     Review of Systems   Constitutional:  Negative for fever.   HENT:  Negative for congestion.    Musculoskeletal:  Negative for back pain, joint swelling and myalgias.   Skin:  Positive for rash.   Psychiatric/Behavioral:  Negative for behavioral problems, self-injury and suicidal ideas. The patient is nervous/anxious. The patient is not hyperactive.      Physical Exam     Initial Vitals [05/15/23 1208]   BP Pulse Resp Temp SpO2   128/72 (!) 112 18 98.3 °F (36.8 °C) 100 %      MAP       --         Physical Exam    Nursing note and vitals reviewed.  Constitutional: She appears well-developed and well-nourished.   HENT:   Head: Normocephalic and atraumatic.   Eyes: Conjunctivae and EOM are normal. Right eye exhibits no discharge. Left eye exhibits no discharge.   Conjunctiva is not injected   Neck:   Normal range of motion.  Cardiovascular:            Patient initially presented tachycardic likely secondary to anxiety  Repeat normal rate   Pulmonary/Chest: No respiratory distress.   Abdominal: She exhibits no distension.   Musculoskeletal:         General: No tenderness or edema. Normal range of motion.      Cervical back: Normal range of motion.     Neurological: She is alert and oriented to person, place, and time.   Skin:   Light Erythematous blanchable papular rash located on the right forearm in chest.  On examination of the abdomen, back and lower extremities no rash involvement.  Rashes not also extend to the palms  Visualize the oropharynx without abnormalities, no mucosal involvement  There is no blistering, or tenderness to palpation of the rash     Psychiatric: She has a normal mood and affect. Thought content normal.       ED Course   Procedures  Labs Reviewed - No data to display       ECG Results              EKG 12-lead (Final  result)  Result time 05/15/23 15:19:17      Final result by Interface, Lab In [facility] (05/15/23 15:19:17)                   Narrative:    Test Reason : F41.9,    Vent. Rate : 112 BPM     Atrial Rate : 112 BPM     P-R Int : 124 ms          QRS Dur : 102 ms      QT Int : 326 ms       P-R-T Axes : 072 020 054 degrees     QTc Int : 444 ms    Sinus tachycardia  Incomplete right bundle branch block  Borderline Abnormal ECG  When compared with ECG of 25-JUN-2013 22:06,  PREVIOUS ECG IS PRESENT  Confirmed by Bere Fischer MD ([id number]) on 5/15/2023 3:19:10 PM    Referred By: [email or web address]   SELF           Confirmed By:Bere Fischer MD                                  Imaging Results    None          Medications - No data to display  Medical Decision Making:   Differential Diagnosis:   Contact dermatitis, allergic dermatitis also considered skin eruption secondary to rash however is not consistent with patient presentation  ED Management:  23-year-old female is anxious in the emergency department due to new onset rash.  Regarding emergent concerns rash is not consistent with hematologic etiology or drug skin eruption  I discussed with the patient rash itself will likely resolve but prescribed topical corticosteroids to use as needed.  I educated patient on concerning features of rash and what to observed over the next few days.  She can also discussed with her psychiatrist new medication management due to anxiety of rash secondary to Lamictal.  Patient discharged home          Attending Attestation:     Physician Attestation Statement for NP/PA:   I have conducted a face to face encounter with this patient in addition to the NP/PA, due to Medical Complexity    Other NP/PA Attestation Additions:    History of Present Illness: This is a 22 yo female who has multiple comorbidities including panic disorder, anxiety, and bipolar disorder. She presents today as she was sitting in class and noticed mid morning that she had developed a  skin eruption to her bilateral arms, left greater than right. It is now on her chest a bit as well. It doesn't itch or hurt. She has no involvement of her palms or soles. No mucous membrane involvement. No fevers. She presented for evaluation of this rash primarily due to the fact that she is taking lamictal and she knew there was a link between the lamictal and skin eruptions. Otherwise, she has had no vomiting, diarrhea, chest pain, shortness of breath, sensation of throat closing or tongue swelling or any other concerns.    Physical Exam: VS upon arrival: 128/72; 88; 18; 98.3 F oral; 99% RA.   Consititutional: Pt is awake, alert, and oriented x 4. She is anxious.   HEENT: PERRL; EOMI; nares patent; op clear; mmm without lesions.  Neck: Supple with good ROM.  CV: Normal rate; regular rhythm; no mrg. Heart sounds normal. No peripheral edema. 2+ radials bilateral and symmetric.  Respiratory: CTA bilaterally with no focal rales, ronchi, or wheezes.  GI: Abdomen soft, NTND. No rebound. No guarding. BS normal.  MSK: No long bone deformities; no focal joint swellings.  Neuro: MAEW.   Skin: The patient has a small palm sized patch of very faint pink macules on her left mid biceps region and a very fine pink macular type rash in a v-shaped distribution on her chest. There is no desquamation or sloughing. There is no involvement anywhere else. There is no involvement of the palms or soles. The skin eruption is hard to discern and very faint.   Psych: Patient is anxious. No SI.       Medical Decision Making: This does not look like a life threatening skin eruption. It is very subtle and limited to one patch on her left biceps area and some mild skin eruption to the chest. She was out in the sun this weekend and this could even represent sun exposure, particularly the skin eruption on the chest given its distribution. It does not have any concerning characteristics for a life threatening rash. No  sloughing. No blistering.  No involvement of mucosa or palms/soles. I am not concerned for SJS/TEN. I am not concerned for DRESS. She has no other associated symptoms just these areas to her skin. I have asked her to watch the skin eruption closely and have given her strict return precautions. Surely if the eruption progresses she will need to be reevaluated. She knows to look for mucous membrane changes / palm / sole changes / fever / other associated symptoms and to return if they develop. At this point, I do not think we can point the finger at the lamictal as the culprit. She will need follow up with reevaluation in the next couple of days to assure that she is getting better, particularly given her lamictal use. She has voiced understanding of the above instructions. She is discharged in stable condition. We have also prescribed some steroid cream for symptomatic treatment.   ED Diagnosis:  1. Acute skin eruption.   2. Above diagnoses complicated by known bipolar disorder, anxiety, and panic disorder on lamictal.   * I have performed the substantive portion of the MDM on this patient. *                        Clinical Impression:   Final diagnoses:  [F41.9] Anxiety  [R21] Rash (Primary)        ED Disposition Condition    Discharge Stable          ED Prescriptions       Medication Sig Dispense Start Date End Date Auth. Provider    triamcinolone acetonide 0.1% (KENALOG) 0.1 % ointment Apply topically 2 (two) times daily. 15 g 5/15/2023 -- Kerry Acevedo PA-C          Follow-up Information       Follow up With Specialties Details Why Contact Info    Crow Camacho MD Family Medicine   [address] [facility] [address] [facility] [address]  [facility] [id number] [phone]               Kerry Acevedo PA-C  05/15/23 2131       Jennifer Lan MD  05/16/23 2233       Jennifer Lan MD  05/16/23 2311

## 2023-05-25 ENCOUNTER — TELEPHONE (OUTPATIENT)
Dept: PSYCHIATRY | Facility: CLINIC | Age: 23
End: 2023-05-25
Payer: MEDICAID

## 2023-05-25 NOTE — TELEPHONE ENCOUNTER
"----- Message from Christine Kira sent at 5/25/2023  9:07 AM CDT -----  Regarding: Medication  Pt is calling regarding her medication, busPIRone (BUSPAR) 30 MG tablet.  She wants to make sure she is to take it "as needed", in lieu of daily.    She can be reached at [phone].    Thank you.    "

## 2023-05-26 ENCOUNTER — PATIENT MESSAGE (OUTPATIENT)
Dept: PSYCHIATRY | Facility: CLINIC | Age: 23
End: 2023-05-26
Payer: MEDICAID

## 2023-06-12 ENCOUNTER — OFFICE VISIT (OUTPATIENT)
Dept: FAMILY MEDICINE | Facility: HOSPITAL | Age: 23
End: 2023-06-12
Payer: MEDICAID

## 2023-06-12 VITALS
HEIGHT: 62 IN | WEIGHT: 99.88 LBS | DIASTOLIC BLOOD PRESSURE: 77 MMHG | BODY MASS INDEX: 18.38 KG/M2 | SYSTOLIC BLOOD PRESSURE: 125 MMHG | HEART RATE: 99 BPM

## 2023-06-12 DIAGNOSIS — F41.0 PANIC DISORDER: Primary | ICD-10-CM

## 2023-06-12 PROCEDURE — 99213 OFFICE O/P EST LOW 20 MIN: CPT | Performed by: STUDENT IN AN ORGANIZED HEALTH CARE EDUCATION/TRAINING PROGRAM

## 2023-06-12 RX ORDER — PROPRANOLOL HYDROCHLORIDE 10 MG/1
10 TABLET ORAL 3 TIMES DAILY PRN
Qty: 90 TABLET | Refills: 11 | Status: SHIPPED | OUTPATIENT
Start: 2023-06-12 | End: 2023-12-07

## 2023-06-12 NOTE — PROGRESS NOTES
[facility]  History & Physical    SUBJECTIVE:     Chief Complaint:   Chief Complaint   Patient presents with    Palpitations       History of Present Illness:  23 y.o. female who  has a past medical history of Allergy, Bipolar affective, Depression, Headache(784.0), History of psychiatric hospitalization, Oppositional defiant disorder, Panic attacks, Self-harming behavior, and Suicide attempt. presents to clinic today for panic attacks. Patient reports multiple recent episodes of feeling her heart race and feeling short of breath. These episodes last less than an hour. The episodes are random and can occur without a specific trigger. Patient is currently on lamictal for bipolar disorder. Patient was previously on buspar for anxiety and insomnia but has stopped taking it.      Allergies:  Review of patient's allergies indicates:   Allergen Reactions    No known drug allergies        Home Medications:  Current Outpatient Medications on File Prior to Visit   Medication Sig    busPIRone (BUSPAR) 15 MG tablet Take 1 tablet po q am    busPIRone (BUSPAR) 30 MG Tab Take 1 tablet po q hs    lamoTRIgine (LAMICTAL) 25 MG tablet Take 2 tablets (50 mg) po q am    fluticasone propionate (FLONASE) 50 mcg/actuation nasal spray 1 spray (50 mcg total) by Each Nostril route once daily. (Patient not taking: Reported on 6/12/2023)    triamcinolone acetonide 0.1% (KENALOG) 0.1 % ointment Apply topically 2 (two) times daily. (Patient not taking: Reported on 6/12/2023)     Current Facility-Administered Medications on File Prior to Visit   Medication    etonogestreL subdermal device 68 mg       Past Medical History:   Diagnosis Date    Allergy     Bipolar affective     Depression     Headache(784.0)     migraine    History of psychiatric hospitalization     Oppositional defiant disorder     Panic attacks     Self-harming behavior     Suicide attempt      Past Surgical History:   Procedure Laterality Date    DILATION AND CURETTAGE  OF UTERUS  10/25/2021    termination of pregnancy    INSERTION OF CONTRACEPTIVE CAPSULE  2022     Family History   Problem Relation Age of Onset    Stroke Father 43        unknown cause    Hypertension Father     Anxiety disorder Father     ADD / ADHD Sister     Migraines Mother     Anxiety disorder Mother     Heart attack Maternal Grandmother     Migraines Maternal Grandmother     Heart attack Maternal Grandfather     Stroke Paternal Grandmother     Heart attack Paternal Grandmother     Stroke Paternal Grandfather     Diabetes Neg Hx     Early death Neg Hx     Breast cancer Neg Hx     Colon cancer Neg Hx     Ovarian cancer Neg Hx      Social History     Tobacco Use    Smoking status: Every Day     Types: Vaping with nicotine    Smokeless tobacco: Current   Substance Use Topics    Alcohol use: Yes     Comment: socially    Drug use: No        Review of Systems   Cardiovascular:  Positive for palpitations.   Psychiatric/Behavioral:  The patient is nervous/anxious.       OBJECTIVE:     Vital Signs:  Pulse: 99 (06/12/23 1349)  BP: 125/77 (06/12/23 1349)  Body mass index is 18.27 kg/m².  Physical Exam  HENT:      Head: Normocephalic.      Right Ear: External ear normal.      Left Ear: External ear normal.      Nose: Nose normal.   Eyes:      Extraocular Movements: Extraocular movements intact.      Pupils: Pupils are equal, round, and reactive to light.   Cardiovascular:      Rate and Rhythm: Normal rate.   Pulmonary:      Effort: Pulmonary effort is normal.   Abdominal:      Palpations: Abdomen is soft.   Musculoskeletal:         General: Normal range of motion.      Cervical back: Normal range of motion.   Skin:     General: Skin is warm.      Capillary Refill: Capillary refill takes less than 2 seconds.   Neurological:      General: No focal deficit present.      Mental Status: She is alert.   Psychiatric:         Mood and Affect: Mood normal.       Laboratory:  No results found for: HGBA1C    A/P:  Cydney was seen  today for palpitations. Suspect worsening panic attacks in setting of discontinued medication. Patient can not take ssri due to lamictal. At this time recommend trial of propanol for as needed therapy of panic attack symptoms. Patient agreeable to this plan. Will assess effect at next appointment.    Diagnoses and all orders for this visit:    Panic disorder  -     propranoloL (INDERAL) 10 MG tablet; Take 1 tablet (10 mg total) by mouth 3 (three) times daily as needed (Panic Attack).        Follow up in about 1 month (around 7/12/2023).      Crow Camacho MD  [facility] PGY-2  06/14/2023

## 2023-06-14 NOTE — PROGRESS NOTES
I assume primary medical responsibility for this patient. I have reviewed the history, physical, and assessment & treatment plan with the resident and agree that the care is reasonable and necessary. This service has been performed by a resident without the presence of a teaching physician under the primary care exception. If necessary, an addendum of additional findings or evaluation beyond the resident documentation will be noted below.        Aaron Gentile Jr., DO    [facility]

## 2023-06-28 ENCOUNTER — NURSE TRIAGE (OUTPATIENT)
Dept: ADMINISTRATIVE | Facility: CLINIC | Age: 23
End: 2023-06-28
Payer: MEDICAID

## 2023-06-28 ENCOUNTER — HOSPITAL ENCOUNTER (EMERGENCY)
Facility: HOSPITAL | Age: 23
Discharge: HOME | End: 2023-06-28
Attending: EMERGENCY MEDICINE
Payer: MEDICAID

## 2023-06-28 VITALS
TEMPERATURE: 98 F | HEART RATE: 115 BPM | WEIGHT: 100 LBS | OXYGEN SATURATION: 98 % | BODY MASS INDEX: 18.4 KG/M2 | SYSTOLIC BLOOD PRESSURE: 122 MMHG | RESPIRATION RATE: 20 BRPM | HEIGHT: 62 IN | DIASTOLIC BLOOD PRESSURE: 72 MMHG

## 2023-06-28 DIAGNOSIS — S39.012A STRAIN OF LUMBAR REGION, INITIAL ENCOUNTER: Primary | ICD-10-CM

## 2023-06-28 LAB
B-HCG UR QL: NEGATIVE
BILIRUB UR QL STRIP: NEGATIVE
CLARITY UR: CLEAR
COLOR UR: YELLOW
CTP QC/QA: YES
GLUCOSE UR QL STRIP: NEGATIVE
HGB UR QL STRIP: NEGATIVE
KETONES UR QL STRIP: NEGATIVE
LEUKOCYTE ESTERASE UR QL STRIP: NEGATIVE
NITRITE UR QL STRIP: NEGATIVE
PH UR STRIP: 7 [PH] (ref 5–8)
PROT UR QL STRIP: NEGATIVE
SP GR UR STRIP: 1.02 (ref 1–1.03)
URN SPEC COLLECT METH UR: NORMAL
UROBILINOGEN UR STRIP-ACNC: NEGATIVE EU/DL

## 2023-06-28 PROCEDURE — 99282 EMERGENCY DEPT VISIT SF MDM: CPT

## 2023-06-28 PROCEDURE — 81003 URINALYSIS AUTO W/O SCOPE: CPT | Performed by: PHYSICIAN ASSISTANT

## 2023-06-28 PROCEDURE — 81025 URINE PREGNANCY TEST: CPT | Performed by: PHYSICIAN ASSISTANT

## 2023-06-28 NOTE — TELEPHONE ENCOUNTER
Reason for Disposition   [1] Loss of bladder or bowel control (urine or bowel incontinence; wetting self, leaking stool) AND [2] new-onset    Additional Information   Negative: Passed out (i.e., lost consciousness, collapsed and was not responding)   Negative: Shock suspected (e.g., cold/pale/clammy skin, too weak to stand, low BP, rapid pulse)   Negative: Sounds like a life-threatening emergency to the triager   Negative: [1] SEVERE back pain (e.g., excruciating) AND [2] sudden onset AND [3] age > 60 years   Negative: [1] Unable to urinate (or only a few drops) > 4 hours AND [2] bladder feels very full (e.g., palpable bladder or strong urge to urinate)    Protocols used: Back Pain-A-YULY  Spoke with pt who reports that she was seen at  yesterday for back pain, and was told to go to ED for loss of bladder or bowel control. States that after stepping out of tub she noticed fluid running down her leg from vaginal area sates she did attempt to urinate on her own, and was able to do so. Pt. Advised to be seen in Ed now. Verbalized understanding

## 2023-06-29 ENCOUNTER — PATIENT OUTREACH (OUTPATIENT)
Dept: EMERGENCY MEDICINE | Facility: HOSPITAL | Age: 23
End: 2023-06-29
Payer: MEDICAID

## 2023-06-29 NOTE — ED PROVIDER NOTES
Encounter Date: 6/28/2023       History     Chief Complaint   Patient presents with    Back Pain     Lower back pain x 6 days, went to urgent care on yesterday and was told that it was muscular and was prescribed Gabapentin. Was told if she couldn't control her bladder to go to ER and on today she stated you started urinating on herself and couldn't control it. Was told on yesterday that she didn't have a UTI or Kidney issues.     23-year-old female presents to ED with concern of continued lower back pain.  She reports back pain began 6 days ago, described as sharp, worse with activities or movement, improved with rest, intermittently radiating towards gluteal region, severity 3/10.  She did report to urgent care yesterday who prescribed her gabapentin and Medrol Dosepak which she began today.  She does admit she is having significant improvement of her symptoms but she was educated to monitor for signs of cauda equina.  She reports after getting out of bath today she noticed watering down her leg and was unsure if it was water or urine, prompting her to report to ED.  She reports she has not lost sensation of her bladder or bowel along with no saddle anesthesia, numbness, focal weakness.  No fevers, chills, nausea, vomiting, or other urinary or bowel complaints.  No other acute complaints at this time.    The history is provided by the patient.   Review of patient's allergies indicates:   Allergen Reactions    No known drug allergies      Past Medical History:   Diagnosis Date    Allergy     Bipolar affective     Depression     Headache(784.0)     migraine    History of psychiatric hospitalization     Oppositional defiant disorder     Panic attacks     Self-harming behavior     Suicide attempt      Past Surgical History:   Procedure Laterality Date    DILATION AND CURETTAGE OF UTERUS  10/25/2021    termination of pregnancy    INSERTION OF CONTRACEPTIVE CAPSULE  2022     Family History   Problem Relation Age of  Onset    Stroke Father 43        unknown cause    Hypertension Father     Anxiety disorder Father     ADD / ADHD Sister     Migraines Mother     Anxiety disorder Mother     Heart attack Maternal Grandmother     Migraines Maternal Grandmother     Heart attack Maternal Grandfather     Stroke Paternal Grandmother     Heart attack Paternal Grandmother     Stroke Paternal Grandfather     Diabetes Neg Hx     Early death Neg Hx     Breast cancer Neg Hx     Colon cancer Neg Hx     Ovarian cancer Neg Hx      Social History     Tobacco Use    Smoking status: Every Day     Types: Vaping with nicotine    Smokeless tobacco: Current   Substance Use Topics    Alcohol use: Yes     Comment: socially    Drug use: No     Review of Systems   Constitutional:  Negative for chills and fever.   HENT:  Negative for congestion and sore throat.    Eyes:  Negative for visual disturbance.   Respiratory:  Negative for cough and shortness of breath.    Cardiovascular:  Negative for chest pain.   Gastrointestinal:  Negative for abdominal pain, diarrhea, nausea and vomiting.   Genitourinary:  Negative for dysuria, flank pain, hematuria, vaginal bleeding and vaginal discharge.   Musculoskeletal:  Positive for back pain. Negative for neck pain and neck stiffness.   Neurological:  Negative for weakness, numbness and headaches.   Psychiatric/Behavioral:  The patient is nervous/anxious.      Physical Exam     Initial Vitals [06/28/23 1925]   BP Pulse Resp Temp SpO2   122/72 (!) 115 20 98.2 °F (36.8 °C) 98 %      MAP       --         Physical Exam    Nursing note and vitals reviewed.  Constitutional: She appears well-developed and well-nourished. She is active. She does not have a sickly appearance. She does not appear ill. No distress.   HENT:   Head: Normocephalic and atraumatic.   Neck:   Normal range of motion.  Musculoskeletal:      Cervical back: Normal range of motion.      Comments: Minimally reproducible tenderness to lower lumbar paraspinal  muscle.  No midline bony tenderness no bony palpable step-offs.  No overlying skin changes, rashes or bruising.  Appropriate sensation and strength into BLE with stable gait in ED.     Neurological: She is alert. GCS eye subscore is 4. GCS verbal subscore is 5. GCS motor subscore is 6.   Skin: Skin is warm and dry.   Psychiatric: She has a normal mood and affect. Her speech is normal and behavior is normal.       ED Course   Procedures  Labs Reviewed   URINALYSIS, REFLEX TO URINE CULTURE    Narrative:     Specimen Source->Urine   POCT URINE PREGNANCY          Imaging Results    None          Medications - No data to display  Medical Decision Making:   Initial Assessment:   Patient presents with concern of continued lower back pain.  Seen at outside facility yesterday for similar complaint.  Started on gabapentin and Medrol Dosepak with reported improvement.  She is concerned because she is unsure if she urinated on herself or if it was just water running down her leg after she got a bathtub earlier today afebrile arrival.  Patient is anxious but otherwise in no distress.  Mild reproducible tenderness into lower lumbar paraspinal muscle.  No midline bony tenderness.  No neurological deficits noted.  Differential Diagnosis:   Strain, sprain, spasm, radiculopathy, neuropathy, sciatica, less likely diskitis, cauda equina  ED Management:  UPT negative.  UA with no signs of urinary infection or hematuria.  With careful consideration, no current exam findings to suggest acute infectious process, diskitis or cauda equina.  Patient is very well-appearing with no neurological deficits.  Appropriate strength into BLE with stable gait.  No urinary or bowel incontinence.  Stable for discharge with close PCP follow-up.  She will continue her prescribed gabapentin and Medrol Dosepak as instructed with addition of Tylenol/Motrin as needed, stretches and movements as tolerated.  ED return precautions were discussed.  Patient states  her understanding and agrees with plan.                        Clinical Impression:   Final diagnoses:  [S39.012A] Strain of lumbar region, initial encounter (Primary)        ED Disposition Condition    Discharge Stable          ED Prescriptions    None       Follow-up Information       Follow up With Specialties Details Why Contact Info    Crow Camacho MD Family Medicine   [address], [facility] [address]  [facility] [address]  [phone]               Maco Christensen PA-C  06/28/23 2052

## 2023-06-29 NOTE — DISCHARGE INSTRUCTIONS

## 2023-06-30 NOTE — PROGRESS NOTES
Sarah Snyder  ED Navigator  Emergency Department    Project: [facility] ED Navigator  Role: Community Health Worker    Date: 06/30/2023  Patient Name: Cydney Patel  MRN: [id number]  PCP: Crow Camacho MD    Assessment:     Cydney Patel is a 23 y.o. female who has presented to ED for back pain. Patient has visited the ED 2 times in the past 3 months. Patient did not contact PCP.     ED Navigator Initial Assessment    ED Navigator Enrollment Documentation  Consent to Services  Does patient consent to completing the assessment?: Yes  Contact  Method of Initial Contact: Phone  Transportation  Does the patient have issues with Transportation?: No  Does the patient have transportation to and from healthcare appointments?: Yes  Insurance Coverage  Do you have coverage/adequate coverage?: Yes  Type/kind of coverage: HEALTHY BLUE [place] [place]  Is patient able to afford co-pays/deductibles?: Yes  Is patient able to afford HME or supplies?: Yes  Does patient have an established [facility] PCP?: Yes  Able to access?: Yes  Does the patient have a lack of adequate coverage?: No  Specialist Appointment  Did the patient come to the ED to see a specialist?: No  Does the patient have a pending specialist referral?: No  Does the patient have a specialist appointment made?: No  PCP Follow Up Appointment  Has the patient had an appointment with a primary care provider in the past year?: Yes  Approximate date: 6/12/23  Provider: Crow Camacho MD  Does the patient have a follow up appontment with a PCP?: Yes  Upcoming appointment date: 7/5/23  Provider: Crow Camacho MD  When was the last time you saw your PCP?: 6/12/23  Medications  Is patient able to afford medication?: Yes  Is patient unable to get medication due to lack of transportation?: No  Psychological  Does the patient have psycho-social concerns?: Yes  What concerns does the patient have?: Anxiety and/or Depression (Comment: hx depression)  Food  Does the patient have concerns  about food?: No  Communication/Education  Does the patient have limited English proficiency/English not primary language?: No  Does patient have low literacy and/or low health literacy?: Yes  Does patient have concerns with care?: No  Does patient have dissatisfaction with care?: No  Other Financial Concerns  Does the patient have immediate financial distress?: No  Does the patient have general financial concerns?: No  Other Social Barriers/Concerns  Does the patient have any additional barriers or concerns?: None  Primary Barrier  Barriers identified: Cognitive barrier (health literacy, language and communication, etc.)  Root Cause of ED Utilization: Patient Knowledge/Low Health Literacy  Plan to address Patient Knowledge/Low Health Literacy: Provided information for [facility] On [facility] 24/7 Nurse triage line [phone] or 1-866-[facility] ([phone])  Next steps: Provided Education  Was education/educational materials provided surrounding PCP services/creating a medical home?: Yes Was education verbal or written?: Written     Was education/educational materials provided surrounding low cost, healthy foods?: Yes Was education verbal or written?: Written     Was education/educational materials provided surrounding other items? If so, use comment to explain.: Yes Was education verbal or written?: Written   Plan: Provided information for [facility] 24/7 Nurse triage line, [phone] or 1-866-[facility] ([phone])  Expected Date of Follow Up 1: 7/31/23         Social History     Socioeconomic History    Marital status: Single   Tobacco Use    Smoking status: Every Day     Types: Vaping with nicotine    Smokeless tobacco: Current   Substance and Sexual Activity    Alcohol use: Yes     Comment: socially    Drug use: No    Sexual activity: Yes     Partners: Male     Birth control/protection: None   Social History Narrative    Lives with parents and 2 sisters (Parker and Sara).  2 dogs.  In school.      Parents   divorce process     Social Determinants of Health     Financial Resource Strain: Low Risk     Difficulty of Paying Living Expenses: Not hard at all   Food Insecurity: No Food Insecurity    Worried About Running Out of Food in the Last Year: Never true    Ran Out of Food in the Last Year: Never true   Transportation Needs: No Transportation Needs    Lack of Transportation (Medical): No    Lack of Transportation (Non-Medical): No   Stress: No Stress Concern Present    Feeling of Stress : Not at all   Social Connections: Unknown    Frequency of Communication with Friends and Family: Three times a week    Frequency of Social Gatherings with Friends and Family: Three times a week    Marital Status: Never    Housing Stability: Low Risk     Unable to Pay for Housing in the Last Year: No    Number of Places Lived in the Last Year: 1    Unstable Housing in the Last Year: No       Plan:   Patient agreed with enrollment and F/U calls. She has appt with her PCP scheduled for 07/05/23. Patient denies resources for transportation, food, housing/rental assistance, utilities, smoking cessation, and anxiety/stress/depression. Emailed [facility] documents (Right [facility] Right Place form, [place] Virtual Visit Flyer, [facility] PCP scheduling assistance, [facility] on call RN #, and Heart Healthy Diet education). Will F/U with pt on 07/31/23

## 2023-07-05 ENCOUNTER — TELEPHONE (OUTPATIENT)
Dept: NEUROLOGY | Facility: CLINIC | Age: 23
End: 2023-07-05
Payer: MEDICAID

## 2023-07-05 ENCOUNTER — OFFICE VISIT (OUTPATIENT)
Dept: FAMILY MEDICINE | Facility: HOSPITAL | Age: 23
End: 2023-07-05
Payer: MEDICAID

## 2023-07-05 VITALS
WEIGHT: 103.19 LBS | DIASTOLIC BLOOD PRESSURE: 84 MMHG | HEIGHT: 62 IN | SYSTOLIC BLOOD PRESSURE: 131 MMHG | BODY MASS INDEX: 18.99 KG/M2 | HEART RATE: 77 BPM

## 2023-07-05 DIAGNOSIS — G62.9 NEUROPATHY: Primary | ICD-10-CM

## 2023-07-05 PROCEDURE — 99214 OFFICE O/P EST MOD 30 MIN: CPT | Performed by: STUDENT IN AN ORGANIZED HEALTH CARE EDUCATION/TRAINING PROGRAM

## 2023-07-05 RX ORDER — GABAPENTIN 300 MG/1
300 CAPSULE ORAL 3 TIMES DAILY
Qty: 90 CAPSULE | Refills: 11 | Status: SHIPPED | OUTPATIENT
Start: 2023-07-05 | End: 2024-07-04

## 2023-07-05 RX ORDER — METHYLPREDNISOLONE 4 MG/1
TABLET ORAL
COMMUNITY
Start: 2023-06-27 | End: 2023-08-29

## 2023-07-05 RX ORDER — GABAPENTIN 100 MG/1
CAPSULE ORAL
COMMUNITY
Start: 2023-06-27 | End: 2023-07-05

## 2023-07-05 RX ORDER — AMOXICILLIN 250 MG/1
250 CAPSULE ORAL 3 TIMES DAILY
COMMUNITY
Start: 2023-06-15 | End: 2023-08-29

## 2023-07-05 RX ORDER — DEXTROMETHORPHAN HB/DOXYLAMINE 15-6.25/15
1 SOLUTION, ORAL ORAL
COMMUNITY
Start: 2023-06-13 | End: 2023-08-29

## 2023-07-05 NOTE — TELEPHONE ENCOUNTER
Spoke to patient about calling the medicaid line to find a Neurologist that can see her since she has medicaid.

## 2023-07-05 NOTE — TELEPHONE ENCOUNTER
----- Message from Bret Posey sent at 7/5/2023  3:32 PM CDT -----  Type:  Sooner Apoointment Request    Caller is requesting a sooner appointment.  Caller declined first available appointment listed below.  Caller will not accept being placed on the waitlist and is requesting a message be sent to doctor.  Name of Caller:pt  When is the first available appointment?none  Symptoms:Neuropathy [G62.9]  Would the patient rather a call back or a response via [facility]? Call  Best Call Back Number:[phone]  Additional Information:

## 2023-07-05 NOTE — PROGRESS NOTES
[facility]  History & Physical    SUBJECTIVE:     Chief Complaint:   Chief Complaint   Patient presents with    Back Pain       History of Present Illness:  23 y.o. female who  has a past medical history of Allergy, Bipolar affective, Depression, Headache(784.0), History of psychiatric hospitalization, Oppositional defiant disorder, Panic attacks, Self-harming behavior, and Suicide attempt. presents to clinic today for abnormal sensation. Patient reports 2 weeks of feeling a cold sensation that starts in her lower back and radiates down to buttocks and up to thoracic region. Patient says this sensation is not painful and is not associated with any other symptoms. Patient was seen by ED for this problem and started on gabapentin 100mg TID. Patient says this was helpful put only worked for 1-2 hours at a time. Also reports significant anxiety related to this new symptom.      Allergies:  Review of patient's allergies indicates:   Allergen Reactions    No known drug allergies        Home Medications:  Current Outpatient Medications on File Prior to Visit   Medication Sig    amoxicillin (AMOXIL) 250 MG capsule Take 250 mg by mouth 3 (three) times daily.    busPIRone (BUSPAR) 15 MG tablet Take 1 tablet po q am    busPIRone (BUSPAR) 30 MG Tab Take 1 tablet po q hs    lamoTRIgine (LAMICTAL) 25 MG tablet Take 2 tablets (50 mg) po q am    LORATADINE-D  mg per 24 hr tablet Take 1 tablet by mouth.    methylPREDNISolone (MEDROL DOSEPACK) 4 mg tablet TAKE 6 TABLETS ON DAY 1 AS DIRECTED ON PACKAGE AND DECREASE BY 1 TAB EACH DAY FOR A TOTAL OF 6 DAYS    propranoloL (INDERAL) 10 MG tablet Take 1 tablet (10 mg total) by mouth 3 (three) times daily as needed (Panic Attack).     Current Facility-Administered Medications on File Prior to Visit   Medication    etonogestreL subdermal device 68 mg       Past Medical History:   Diagnosis Date    Allergy     Bipolar affective     Depression     Headache(784.0)     migraine     History of psychiatric hospitalization     Oppositional defiant disorder     Panic attacks     Self-harming behavior     Suicide attempt      Past Surgical History:   Procedure Laterality Date    DILATION AND CURETTAGE OF UTERUS  10/25/2021    termination of pregnancy    INSERTION OF CONTRACEPTIVE CAPSULE  2022     Family History   Problem Relation Age of Onset    Stroke Father 43        unknown cause    Hypertension Father     Anxiety disorder Father     ADD / ADHD Sister     Migraines Mother     Anxiety disorder Mother     Heart attack Maternal Grandmother     Migraines Maternal Grandmother     Heart attack Maternal Grandfather     Stroke Paternal Grandmother     Heart attack Paternal Grandmother     Stroke Paternal Grandfather     Diabetes Neg Hx     Early death Neg Hx     Breast cancer Neg Hx     Colon cancer Neg Hx     Ovarian cancer Neg Hx      Social History     Tobacco Use    Smoking status: Every Day     Types: Vaping with nicotine    Smokeless tobacco: Current   Substance Use Topics    Alcohol use: Yes     Comment: socially    Drug use: No        Review of Systems   Constitutional:  Negative for fever and weight loss.   HENT:  Negative for hearing loss and sore throat.    Eyes:  Negative for blurred vision.   Respiratory:  Negative for cough, shortness of breath and wheezing.    Cardiovascular:  Negative for chest pain and leg swelling.   Gastrointestinal:  Negative for heartburn, nausea and vomiting.   Genitourinary:  Negative for dysuria.   Musculoskeletal:  Negative for back pain, joint pain and myalgias.   Neurological:  Positive for sensory change. Negative for dizziness, weakness and headaches.        Cold sensation on back      OBJECTIVE:     Vital Signs:  Pulse: 77 (07/05/23 1118)  BP: 131/84 (07/05/23 1118)  Body mass index is 18.87 kg/m².  Physical Exam  HENT:      Head: Normocephalic.      Right Ear: External ear normal.      Left Ear: External ear normal.      Nose: Nose normal.       Mouth/Throat:      Mouth: Mucous membranes are moist.   Eyes:      Extraocular Movements: Extraocular movements intact.      Pupils: Pupils are equal, round, and reactive to light.   Cardiovascular:      Rate and Rhythm: Normal rate.   Pulmonary:      Effort: Pulmonary effort is normal.   Musculoskeletal:         General: Normal range of motion.      Cervical back: Normal range of motion.   Skin:     General: Skin is warm and dry.      Capillary Refill: Capillary refill takes less than 2 seconds.      Coloration: Skin is not pale.      Findings: No bruising, erythema, lesion or rash.   Neurological:      General: No focal deficit present.      Mental Status: She is alert and oriented to person, place, and time.   Psychiatric:         Mood and Affect: Mood normal.       Laboratory:  No results found for: HGBA1C    A/P:  Cydney was seen today for abnormal sensations. Suspect sensations are possibly atypical presentation of anxiety. Can not rule out more severe neurological pathology such as MS or nerve impingement.     Diagnoses and all orders for this visit:    Neuropathy  -     gabapentin (NEURONTIN) 300 MG capsule; Take 1 capsule (300 mg total) by mouth 3 (three) times daily.  -     Ambulatory referral/consult to Neurology; Future        Follow up in about 3 months (around 10/5/2023).      Crow Camacho MD  [facility] PGY-2  07/06/2023

## 2023-07-17 ENCOUNTER — OFFICE VISIT (OUTPATIENT)
Dept: URGENT CARE | Facility: CLINIC | Age: 23
End: 2023-07-17
Payer: MEDICAID

## 2023-07-17 VITALS
TEMPERATURE: 98 F | WEIGHT: 103 LBS | HEIGHT: 62 IN | HEART RATE: 75 BPM | RESPIRATION RATE: 18 BRPM | BODY MASS INDEX: 18.95 KG/M2 | DIASTOLIC BLOOD PRESSURE: 78 MMHG | OXYGEN SATURATION: 98 % | SYSTOLIC BLOOD PRESSURE: 122 MMHG

## 2023-07-17 DIAGNOSIS — R21 RASH AND OTHER NONSPECIFIC SKIN ERUPTION: Primary | ICD-10-CM

## 2023-07-17 PROCEDURE — 99213 PR OFFICE/OUTPT VISIT, EST, LEVL III, 20-29 MIN: ICD-10-PCS | Mod: S$GLB,,, | Performed by: NURSE PRACTITIONER

## 2023-07-17 PROCEDURE — 99213 OFFICE O/P EST LOW 20 MIN: CPT | Mod: S$GLB,,, | Performed by: NURSE PRACTITIONER

## 2023-07-17 RX ORDER — TRIAMCINOLONE ACETONIDE 1 MG/G
CREAM TOPICAL 2 TIMES DAILY PRN
Qty: 15 G | Refills: 0 | Status: SHIPPED | OUTPATIENT
Start: 2023-07-17 | End: 2023-08-29

## 2023-07-17 NOTE — PROGRESS NOTES
"Subjective:      Patient ID: Cydney Patel is a 23 y.o. female.    Vitals:  height is 5' 2" (1.575 m) and weight is 46.7 kg (103 lb). Her oral temperature is 98.3 °F (36.8 °C). Her blood pressure is 122/78 and her pulse is 75. Her respiration is 18 and oxygen saturation is 98%.     Chief Complaint: Rash (Chest and back area)    23 year old female patient presents with rash on chest and back area she states has been there for 2-3 weeks.  Denies using any different products or detergent, denies exposure to nodes, patient reports itching at times,denies fever, body aches or chills, denies cough, wheezing or shortness of breath, denies nausea, vomiting, diarrhea or abdominal pain, denies chest pain or dizziness positional lightheadedness, denies sore throat or trouble swallowing, denies loss of taste or smell, or any other symptoms        Rash  This is a new problem. The current episode started 1 to 4 weeks ago (2-3 weeks). The problem is unchanged. The affected locations include the chest and back. The rash is characterized by redness. She was exposed to nothing. Pertinent negatives include no congestion, cough, facial edema or sore throat. Past treatments include nothing. There is no history of asthma or eczema.     HENT:  Negative for congestion and sore throat.    Respiratory:  Negative for cough.    Skin:  Positive for rash. Negative for erythema.    Objective:     Physical Exam   Constitutional: She is oriented to person, place, and time. She appears well-developed.   HENT:   Head: Normocephalic and atraumatic. Head is without abrasion, without contusion and without laceration.   Ears:   Right Ear: External ear normal.   Left Ear: External ear normal.   Nose: Nose normal.   Mouth/Throat: Oropharynx is clear and moist and mucous membranes are normal.   Eyes: Conjunctivae, EOM and lids are normal. Pupils are equal, round, and reactive to light.   Neck: Trachea normal and phonation normal. Neck supple. "   Cardiovascular: Normal rate, regular rhythm and normal heart sounds.   Pulmonary/Chest: Effort normal and breath sounds normal. No stridor. No respiratory distress.   Musculoskeletal: Normal range of motion.         General: Normal range of motion.   Neurological: She is alert and oriented to person, place, and time.   Skin: Skin is warm, dry, intact and rash (diffuse erythematous rash noted to chest and back, no vesicular or pustular lesion noted, no rash to bilateral upper and lower extremities, no rsh noted to neck or face). Capillary refill takes less than 2 seconds. No abrasion, No burn, No bruising, No erythema and No ecchymosis   Psychiatric: Her speech is normal and behavior is normal. Judgment and thought content normal.   Nursing note and vitals reviewed.      Patient in no acute distress.  Vitals reassuring.  Discussed results/diagnosis/plan in depth with patient in clinic. Strict precautions given to patient to monitor for worsening signs and symptoms. Advised to follow up with primary.All questions answered. Strict ER precautions given. If your symptoms worsens or fail to improve you should go to the Emergency Room. Discharge and follow-up instructions given verbally/printed. Discharge and follow-up instructions discussed with the patient who expressed understanding and willingness to comply with my recommendations.Patient voiced understanding and in agreement with current treatment plan.     Please be advised this text was dictated with [employer] software and may contain errors due to translation.    Assessment:     1. Rash and other nonspecific skin eruption        Plan:       Rash and other nonspecific skin eruption    Other orders  -     triamcinolone acetonide 0.1% (KENALOG) 0.1 % cream; Apply topically 2 (two) times daily as needed (for itching).  Dispense: 15 g; Refill: 0                  Patient Instructions   If your condition worsens or fails to improve we recommend that you receive another  evaluation at the ER immediately or contact your PCP to discuss your concerns or return here. You must understand that you've received an urgent care treatment only and that you may be released before all your medical problems are known or treated. You the patient will arrange for followup care as instructed.    If you were prescribed antibiotics, please take them to completion.  If you were prescribed a narcotic medication, do not drive or operate heavy equipment or machinery while taking these medications.  Please follow up with your primary care doctor or specialist as needed.  If you  smoke, please stop smoking.

## 2023-07-19 ENCOUNTER — NURSE TRIAGE (OUTPATIENT)
Dept: ADMINISTRATIVE | Facility: CLINIC | Age: 23
End: 2023-07-19
Payer: MEDICAID

## 2023-07-19 NOTE — TELEPHONE ENCOUNTER
Patient states she has a Nexplanon contraceptive implant in place and states placement was completed in January, 2023. Patient states she woke this AM with mild abdominal cramping and back pain and vaginal spotting. Patient states spotting was minimal. Patient states history of amenorrhea since nexplanon placement and is concerned about spotting.    Care Advice given per Contraception - Implant Symptoms and Questions (Adult). Patient states understanding at this time and cites no additional concerns.     Reason for Disposition   Irregular vaginal bleeding or spotting and has a birth control implant    Additional Information   Negative: Sounds like a life-threatening emergency to the triager   Negative: Abdominal pain and pregnant < 20 weeks   Negative: Vaginal bleeding and pregnant <  20 weeks   Negative: Vaginal discharge is main symptom   Negative: SEVERE abdominal pain (e.g., excruciating) and present > 1 hour   Negative: SEVERE vaginal bleeding (e.g., soaking 2 pads or tampons per hour and present 2 or more hours; 1 menstrual cup every 2 hours)   Negative: SEVERE dizziness (e.g., unable to stand, requires support to walk, feels like passing out now)   Negative: Patient sounds very sick or weak to the triager   Negative: MODERATE vaginal bleeding (e.g., soaking 1 pad or tampon per hour and present > 6 hours; 1 menstrual cup every 6 hours)   Negative: Vaginal bleeding is WORSE than normal (e.g., heavier) and dizziness or lightheadedness   Negative: Constant abdominal pain and present > 2 hours   Negative: Caller has URGENT question and triager unable to answer question   Negative: Implant site looks infected (spreading redness, red streak, or pus) and NO fever   Negative: Implant looks like it is coming out   Negative: Pregnant   Negative: Vaginal bleeding with > 6 soaked pads or tampons per day and lasts > 7 days   Negative: Implant feels bent or broken   Negative: Patient wants to be seen   Negative: Vaginal  bleeding with > 6 soaked pads or tampons per day, BUT lasts 7 days or less   Negative: Vaginal bleeding lasts > 7 days   Negative: Has an Implanon or Nexplanon implant and more than 3 years since it was placed   Negative: Has a Jadelle or Norplant implant and more than 5 years since it was placed   Negative: Has birth control implant AND can't feel implant under the skin   Negative: Wants to get birth control implant removed   Negative: Birth Control Implants, questions about   Negative: No monthly bleeding and has a birth control implant    Protocols used: Contraception - Implant Symptoms and [id number], Vaginal Symptoms-A-OH

## 2023-07-22 ENCOUNTER — HOSPITAL ENCOUNTER (EMERGENCY)
Facility: HOSPITAL | Age: 23
Discharge: HOME | End: 2023-07-22
Attending: EMERGENCY MEDICINE
Payer: MEDICAID

## 2023-07-22 VITALS
TEMPERATURE: 98 F | RESPIRATION RATE: 18 BRPM | WEIGHT: 100 LBS | HEART RATE: 87 BPM | OXYGEN SATURATION: 100 % | SYSTOLIC BLOOD PRESSURE: 122 MMHG | BODY MASS INDEX: 18.29 KG/M2 | DIASTOLIC BLOOD PRESSURE: 68 MMHG

## 2023-07-22 DIAGNOSIS — R10.2 LEFT ADNEXAL TENDERNESS: Primary | ICD-10-CM

## 2023-07-22 LAB
AMORPH CRY URNS QL MICRO: ABNORMAL
B-HCG UR QL: NEGATIVE
BACTERIA #/AREA URNS HPF: ABNORMAL /HPF
BILIRUB UR QL STRIP: NEGATIVE
CLARITY UR: ABNORMAL
COLOR UR: YELLOW
CTP QC/QA: YES
GLUCOSE UR QL STRIP: NEGATIVE
HGB UR QL STRIP: ABNORMAL
KETONES UR QL STRIP: NEGATIVE
LEUKOCYTE ESTERASE UR QL STRIP: NEGATIVE
MICROSCOPIC COMMENT: ABNORMAL
NITRITE UR QL STRIP: NEGATIVE
PH UR STRIP: 8 [PH] (ref 5–8)
PROT UR QL STRIP: NEGATIVE
RBC #/AREA URNS HPF: 1 /HPF (ref 0–4)
SP GR UR STRIP: 1.01 (ref 1–1.03)
SQUAMOUS #/AREA URNS HPF: 1 /HPF
URN SPEC COLLECT METH UR: ABNORMAL
UROBILINOGEN UR STRIP-ACNC: NEGATIVE EU/DL

## 2023-07-22 PROCEDURE — 81025 URINE PREGNANCY TEST: CPT

## 2023-07-22 PROCEDURE — 81000 URINALYSIS NONAUTO W/SCOPE: CPT

## 2023-07-22 PROCEDURE — 25000003 PHARM REV CODE 250

## 2023-07-22 PROCEDURE — 99284 EMERGENCY DEPT VISIT MOD MDM: CPT | Mod: 25

## 2023-07-22 RX ORDER — IBUPROFEN 400 MG/1
800 TABLET ORAL
Status: COMPLETED | OUTPATIENT
Start: 2023-07-22 | End: 2023-07-22

## 2023-07-22 RX ADMIN — IBUPROFEN 800 MG: 400 TABLET ORAL at 12:07

## 2023-07-22 NOTE — DISCHARGE INSTRUCTIONS
Rotate tylenol and ibuprofen for pain. Be sure to follow up with OBGYN. Thank you for choosing [facility]! I hope you feel better soon. You may return to the ER at any time for any new or concerning symptoms, worsening condition, or failure to improve.    Our goal in the ER is to always give you outstanding care and exceptional service. You may receive a survey by mail or email in the next week about your experience in our ED. We would greatly appreciate you completing and returning the survey. Your feedback provides us with a way to recognize our staff who give very good care and it helps us learn how to improve when your experience was below our aspiration of excellence.     Sincerely,     Radha Adam PA-C  Emergency Room Physician Assistant  [facility]

## 2023-07-22 NOTE — ED PROVIDER NOTES
Encounter Date: 7/22/2023       History     Chief Complaint   Patient presents with    Abdominal Pain     Pt has lower abdominal pain that she went to urgent care for this morning. They suggested the ER for further eval and treatment. Pt denies nausea, vomiting or diarrhea. Pt also denies dysuria or vaginal discharge. Pt is currently on her period after not having one for about a year due to birth control. Pain started with the menstrual cycle.      23-year-old female with PMH headache, panic attacks, psych hospitalization, bipolar, presents to the ED with lower abdominal pain, worse to LLQ.  Patient is on Nexplanon.  She reports she has not had a menstrual cycle in about a year.  She started with menstrual bleeding on Thursday with associated lower abdominal pain.  Symptoms continued Friday.  Today reports vaginal bleeding is very light, but abdominal pain continues.  She has been taking Midol for pain which is mildly relieving.  Some nausea.  No vomiting, fever, dysuria, hematuria, vaginal discharge/pruritus.    The history is provided by the patient.   Review of patient's allergies indicates:   Allergen Reactions    No known drug allergies      Past Medical History:   Diagnosis Date    Allergy     Bipolar affective     Depression     Headache(784.0)     migraine    History of psychiatric hospitalization     Oppositional defiant disorder     Panic attacks     Self-harming behavior     Suicide attempt      Past Surgical History:   Procedure Laterality Date    DILATION AND CURETTAGE OF UTERUS  10/25/2021    termination of pregnancy    INSERTION OF CONTRACEPTIVE CAPSULE  2022     Family History   Problem Relation Age of Onset    Stroke Father 43        unknown cause    Hypertension Father     Anxiety disorder Father     ADD / ADHD Sister     Migraines Mother     Anxiety disorder Mother     Heart attack Maternal Grandmother     Migraines Maternal Grandmother     Heart attack Maternal Grandfather     Stroke Paternal  Grandmother     Heart attack Paternal Grandmother     Stroke Paternal Grandfather     Diabetes Neg Hx     Early death Neg Hx     Breast cancer Neg Hx     Colon cancer Neg Hx     Ovarian cancer Neg Hx      Social History     Tobacco Use    Smoking status: Every Day     Types: Vaping with nicotine    Smokeless tobacco: Current   Substance Use Topics    Alcohol use: Yes     Comment: socially    Drug use: No     Review of Systems   Constitutional:  Negative for chills and fever.   HENT:  Negative for congestion.    Respiratory:  Negative for shortness of breath.    Cardiovascular:  Negative for chest pain.   Gastrointestinal:  Positive for abdominal pain. Negative for nausea and vomiting.   Genitourinary:  Positive for vaginal bleeding. Negative for dysuria and vaginal discharge.   Musculoskeletal:  Negative for neck pain.   Skin:  Negative for color change.   Neurological:  Negative for headaches.   Psychiatric/Behavioral:  Negative for agitation and confusion. The patient is nervous/anxious.      Physical Exam     Initial Vitals [07/22/23 1025]   BP Pulse Resp Temp SpO2   111/62 94 18 98.1 °F (36.7 °C) 99 %      MAP       --         Physical Exam    Nursing note and vitals reviewed.  Constitutional: She appears well-developed and well-nourished. She is not diaphoretic.  Non-toxic appearance. No distress.   HENT:   Head: Normocephalic and atraumatic.   Right Ear: Hearing and external ear normal.   Left Ear: Hearing and external ear normal.   Eyes: EOM are normal.   Neck: Neck supple.   Normal range of motion.  Cardiovascular:  Normal rate.           Pulmonary/Chest: No respiratory distress.   Abdominal: Abdomen is soft. She exhibits no distension. There is abdominal tenderness in the suprapubic area and left lower quadrant. There is no rebound and no guarding.   Musculoskeletal:         General: Normal range of motion.      Cervical back: Normal range of motion and neck supple. Normal range of motion.     Neurological:  She is alert and oriented to person, place, and time. GCS score is 15. GCS eye subscore is 4. GCS verbal subscore is 5. GCS motor subscore is 6.   Skin: Skin is warm and dry.   Palpable implant to left upper extremity.  No signs of infection.  No cracks or bends to implant.   Psychiatric: She has a normal mood and affect. Her behavior is normal. Judgment and thought content normal.       ED Course   Procedures  Labs Reviewed   URINALYSIS, REFLEX TO URINE CULTURE - Abnormal; Notable for the following components:       Result Value    Appearance, UA Cloudy (*)     Occult Blood UA 3+ (*)     All other components within normal limits    Narrative:     Specimen Source->Urine   URINALYSIS MICROSCOPIC - Abnormal; Notable for the following components:    Bacteria Few (*)     All other components within normal limits    Narrative:     Specimen Source->Urine   POCT URINE PREGNANCY          Imaging Results              US Pelvis Comp with Transvag NON-OB (xpd (Final result)  Result time 07/22/23 12:14:13      Final result by Tato Marcelo MD (07/22/23 12:14:13)                   Impression:      1. Right ovarian simple cyst.  2. Unremarkable appearance of uterus and left ovary.      Electronically signed by: Tato Marcelo MD  Date:    07/22/2023  Time:    12:14               Narrative:    EXAMINATION:  US PELVIS COMP WITH TRANSVAG NON-OB (XPD)    CLINICAL HISTORY:  left adnexal pain;    TECHNIQUE:  Transabdominal and transvaginal pelvic ultrasound.    COMPARISON:  None    FINDINGS:  Uterus: Measures 7.2 x 3.5 x 5.1 cm.  Endometrial echo complex measures 2 mm, unremarkable.  Appearance is unremarkable.    Right ovary: Measures 3.9 x 2.6 x 4.0 cm.  Ovarian blood flow is present.  There is a 3.0 x 1.4 x 2.6 cm simple cyst.    Left ovary: Measures 3.2 x 2.2 x 2.7 cm.  Appearance is unremarkable.  Blood flow is present.    Miscellaneous: No free fluid.                                       Medications   ibuprofen tablet 800 mg  (800 mg Oral Given 7/22/23 1219)     Medical Decision Making:   Initial Assessment:   Well appearing 23-year-old female with vaginal bleeding, lower abdominal pain, worse to left lower quadrant.  Has Nexplanon implant and has not had a menstrual cycle in about a year, but has had vaginal bleeding for 3 days.  Patient concerned for ectopic pregnancy though negative UPT at urgent care today. No evidence of hernia.  Abdomen soft, mildly tender to suprapubic and left lower quadrant.  Pain could be due to normal menstruation/cyst, will evaluate with pelvic ultrasound.  Ibuprofen for pain  Differential Diagnosis:   Appendicitis, cholecystitis, cholangitis, pancreatitis, hepatitis, abdominal aortic aneurysm, diabetic ketoacidosis, bowel obstruction, intra-abdominal perforation, intra-abdominal infection vs. abscess, nephrolithiasis, pyelonephritis, urinary tract infection, electrolyte and/or metabolic abnormality, testicular/ovarian torsion, shingles.    ED Management:  Urine pregnancy test negative.  Urinalysis without evidence of infection.  Ultrasound grossly unremarkable, right ovarian simple cyst noted.  Patient reassured with results.  All questions were answered.  Patient instructed to follow up with OBGYN.  Rotate Midol/ibuprofen for pain.  ED return precautions discussed for any worsening of condition or failure to improve.  Patient voiced understanding.    Discussed patient with Dr. Wise.           ED Course as of 07/22/23 2251   Sat Jul 22, 2023   1209 Preg Test, Ur: Negative [CS]   1209 Urinalysis, Reflex to Urine Culture Urine, Clean Catch(!)  No evidence of infection. 3+ blood not unexpected due to vaginal bleeding [CS]   1217 US Pelvis Comp with Transvag NON-OB (xpd  1. Right ovarian simple cyst.  2. Unremarkable appearance of uterus and left ovary. [CS]      ED Course User Index  [CS] Radha Adam PA-C                   Clinical Impression:   Final diagnoses:  [R10.2] Left adnexal tenderness  (Primary)        ED Disposition Condition    Discharge Stable          ED Prescriptions    None       Follow-up Information       Follow up With Specialties Details Why Contact Info    ESTHER Medina MD Obstetrics and Gynecology, Obstetrics, Gynecology Go to   [address] [place] [id number]  [phone]               Radha Adam PA-C  07/22/23 0180

## 2023-07-22 NOTE — ED NOTES
Patient identifiers verified by spelling and stated name on armband along with .    Review of patient's allergies indicates:   Allergen Reactions    No known drug allergies        APPEARANCE: Alert, oriented x 4, and in no acute distress.  NEURO: 5/5 strength major flexors/extensors bilaterally. Sensory intact to light touch bilaterally. [place] coma scale: eyes open spontaneously-4, oriented & converses-5, obeys commands-6. No neurological abnormalities. Denies HA, dizziness, photophobia.  CARDIAC: Normal rate and rhythm through apical/radial pulse, S1 and S2 noted, denies CP.   RESPIRATORY:Normal rate and effort, breath sounds clear bilaterally throughout chest anterior and posterior. Respirations are equal and unlabored, no obvious signs of distress. No SOB reported.  PERIPHERAL VASCULAR: +2 peripheral pulses present. Normal cap refill <3sec. No edema. Warm to touch.   GASTRO: Abdomen soft, flat, bowel sounds normal and active in all 4 quadrants, no tenderness, no abdominal distention. Denies NVD.  MUSC: Full ROM. No bony tenderness or soft tissue tenderness. No obvious deformity.  SKIN: Skin is warm and dry, normal skin turgor, mucous membranes moist.   GENITOURINARY: +spotting, left pelvic pain, Voids spontaneously, denies itching, burning.    Patient changed into hospital gown.

## 2023-07-31 ENCOUNTER — PATIENT OUTREACH (OUTPATIENT)
Dept: EMERGENCY MEDICINE | Facility: HOSPITAL | Age: 23
End: 2023-07-31
Payer: MEDICAID

## 2023-08-03 ENCOUNTER — TELEPHONE (OUTPATIENT)
Dept: FAMILY MEDICINE | Facility: HOSPITAL | Age: 23
End: 2023-08-03
Payer: MEDICAID

## 2023-08-03 NOTE — TELEPHONE ENCOUNTER
Called patient and confirmed identity. Discussed recent issue with vaginal spotting and abdominal pain. Pain as resolved. Discussed vaginal spotting being a common event on Nexplanon, that is more likely as the implant ages. Patient confirmed understanding. All questions and answered and concerns addressed.

## 2023-08-21 DIAGNOSIS — F60.3 BORDERLINE PERSONALITY DISORDER: ICD-10-CM

## 2023-08-21 RX ORDER — LAMOTRIGINE 25 MG/1
TABLET ORAL
Qty: 60 TABLET | Refills: 0 | Status: SHIPPED | OUTPATIENT
Start: 2023-08-21 | End: 2023-09-14 | Stop reason: SDUPTHER

## 2023-08-29 ENCOUNTER — OFFICE VISIT (OUTPATIENT)
Dept: OBSTETRICS AND GYNECOLOGY | Facility: CLINIC | Age: 23
End: 2023-08-29
Payer: MEDICAID

## 2023-08-29 ENCOUNTER — LAB VISIT (OUTPATIENT)
Dept: LAB | Facility: HOSPITAL | Age: 23
End: 2023-08-29
Attending: OBSTETRICS & GYNECOLOGY
Payer: MEDICAID

## 2023-08-29 VITALS
DIASTOLIC BLOOD PRESSURE: 64 MMHG | SYSTOLIC BLOOD PRESSURE: 100 MMHG | BODY MASS INDEX: 19.47 KG/M2 | WEIGHT: 105.81 LBS | HEIGHT: 62 IN

## 2023-08-29 DIAGNOSIS — Z01.419 WELL WOMAN EXAM WITH ROUTINE GYNECOLOGICAL EXAM: Primary | ICD-10-CM

## 2023-08-29 DIAGNOSIS — Z11.3 SCREEN FOR STD (SEXUALLY TRANSMITTED DISEASE): ICD-10-CM

## 2023-08-29 LAB
HBV SURFACE AG SERPL QL IA: NORMAL
HCV AB SERPL QL IA: NORMAL
HIV 1+2 AB+HIV1 P24 AG SERPL QL IA: NORMAL

## 2023-08-29 PROCEDURE — 87340 HEPATITIS B SURFACE AG IA: CPT | Performed by: OBSTETRICS & GYNECOLOGY

## 2023-08-29 PROCEDURE — 99395 PR PREVENTIVE VISIT,EST,18-39: ICD-10-PCS | Mod: S$PBB,,, | Performed by: OBSTETRICS & GYNECOLOGY

## 2023-08-29 PROCEDURE — 81514 NFCT DS BV&VAGINITIS DNA ALG: CPT | Performed by: OBSTETRICS & GYNECOLOGY

## 2023-08-29 PROCEDURE — 1159F PR MEDICATION LIST DOCUMENTED IN MEDICAL RECORD: ICD-10-PCS | Mod: CPTII,,, | Performed by: OBSTETRICS & GYNECOLOGY

## 2023-08-29 PROCEDURE — 3074F PR MOST RECENT SYSTOLIC BLOOD PRESSURE < 130 MM HG: ICD-10-PCS | Mod: CPTII,,, | Performed by: OBSTETRICS & GYNECOLOGY

## 2023-08-29 PROCEDURE — 3008F PR BODY MASS INDEX (BMI) DOCUMENTED: ICD-10-PCS | Mod: CPTII,,, | Performed by: OBSTETRICS & GYNECOLOGY

## 2023-08-29 PROCEDURE — 99395 PREV VISIT EST AGE 18-39: CPT | Mod: S$PBB,,, | Performed by: OBSTETRICS & GYNECOLOGY

## 2023-08-29 PROCEDURE — 1160F PR REVIEW ALL MEDS BY PRESCRIBER/CLIN PHARMACIST DOCUMENTED: ICD-10-PCS | Mod: CPTII,,, | Performed by: OBSTETRICS & GYNECOLOGY

## 2023-08-29 PROCEDURE — 87389 HIV-1 AG W/HIV-1&-2 AB AG IA: CPT | Performed by: OBSTETRICS & GYNECOLOGY

## 2023-08-29 PROCEDURE — 86592 SYPHILIS TEST NON-TREP QUAL: CPT | Performed by: OBSTETRICS & GYNECOLOGY

## 2023-08-29 PROCEDURE — 86803 HEPATITIS C AB TEST: CPT | Performed by: OBSTETRICS & GYNECOLOGY

## 2023-08-29 PROCEDURE — 1160F RVW MEDS BY RX/DR IN RCRD: CPT | Mod: CPTII,,, | Performed by: OBSTETRICS & GYNECOLOGY

## 2023-08-29 PROCEDURE — 1159F MED LIST DOCD IN RCRD: CPT | Mod: CPTII,,, | Performed by: OBSTETRICS & GYNECOLOGY

## 2023-08-29 PROCEDURE — 99999 PR PBB SHADOW E&M-EST. PATIENT-LVL III: CPT | Mod: PBBFAC,,, | Performed by: OBSTETRICS & GYNECOLOGY

## 2023-08-29 PROCEDURE — 87491 CHLMYD TRACH DNA AMP PROBE: CPT | Performed by: OBSTETRICS & GYNECOLOGY

## 2023-08-29 PROCEDURE — 87591 N.GONORRHOEAE DNA AMP PROB: CPT | Performed by: OBSTETRICS & GYNECOLOGY

## 2023-08-29 PROCEDURE — 3078F DIAST BP <80 MM HG: CPT | Mod: CPTII,,, | Performed by: OBSTETRICS & GYNECOLOGY

## 2023-08-29 PROCEDURE — 3078F PR MOST RECENT DIASTOLIC BLOOD PRESSURE < 80 MM HG: ICD-10-PCS | Mod: CPTII,,, | Performed by: OBSTETRICS & GYNECOLOGY

## 2023-08-29 PROCEDURE — 36415 COLL VENOUS BLD VENIPUNCTURE: CPT | Performed by: OBSTETRICS & GYNECOLOGY

## 2023-08-29 PROCEDURE — 3008F BODY MASS INDEX DOCD: CPT | Mod: CPTII,,, | Performed by: OBSTETRICS & GYNECOLOGY

## 2023-08-29 PROCEDURE — 99213 OFFICE O/P EST LOW 20 MIN: CPT | Mod: PBBFAC,PO | Performed by: OBSTETRICS & GYNECOLOGY

## 2023-08-29 PROCEDURE — 99999 PR PBB SHADOW E&M-EST. PATIENT-LVL III: ICD-10-PCS | Mod: PBBFAC,,, | Performed by: OBSTETRICS & GYNECOLOGY

## 2023-08-29 PROCEDURE — 3074F SYST BP LT 130 MM HG: CPT | Mod: CPTII,,, | Performed by: OBSTETRICS & GYNECOLOGY

## 2023-08-29 NOTE — PROGRESS NOTES
"  OBSTETRIC HISTORY:   OB History          2    Para   1    Term   1            AB   1    Living   1         SAB        IAB        Ectopic        Multiple   0    Live Births   1                  COMPREHENSIVE GYN HISTORY:  PAP History: Denies abnormal Paps.  Infection History: Denies STDs. Denies PID.  Benign History: Denies uterine fibroids. Denies ovarian cysts. Denies endometriosis.   Cancer History: Denies cervical cancer. Denies uterine cancer or hyperplasia. Denies ovarian cancer. Denies vulvar cancer or pre-cancer. Denies vaginal cancer or pre-cancer. Denies breast cancer. Denies colon cancer.  Sexual Activity History:   reports being sexually active and has had partner(s) who are male. She reports using the following method of birth control/protection: None. Nexplanon currently  Menstrual History: Currently has irregular periods with Nexplanon  Dysmenorrhea History: Reports no dysmenorrhea.  Contraception: Nexplanon    HPI:   23 y.o.  Patient's last menstrual period was 2023.   Patient is  here for her annual gynecologic exam.  She has no GYN complaints except purplish lesion on vulva from shaving and F/U of cyst.. She denies bladder, breast and bowel complaints.    ROS:  GENERAL: Denies weight gain or weight loss. Feeling well overall.   SKIN: Denies rash or lesions.   HEAD: Denies headache.   NODES: Denies enlarged lymph nodes.   CHEST: Denies shortness of breath.   ABDOMEN: No abdominal pain, constipation, diarrhea, nausea, vomiting or rectal bleeding.   URINARY: No frequency, dysuria, hematuria, or burning on urination.  REPRODUCTIVE: See HPI.   BREASTS: The patient denies pain, lumps, or nipple discharge.   HEMATOLOGIC: No easy bruisability.   MUSCULOSKELETAL: Denies joint pain or back pain.   NEUROLOGIC: Denies weakness.   PSYCHIATRIC: Denies depression, anxiety or mood swings.    PE:   /64   Ht 5' 2" (1.575 m)   Wt 48 kg (105 lb 12.8 oz)   LMP 2023   BMI 19.35 kg/m² "   APPEARANCE: Well nourished, well developed, in no acute distress.  NECK: Neck symmetric without  thyromegaly.  NODES: No inguinal, cervical lymph node enlargement.  CHEST: Lungs clear to auscultation.  HEART: Regular rate and rhythm, no murmurs, rubs or gallops.  ABDOMEN: Soft. No tenderness or masses. No hernias.  BREASTS: Symmetrical, no skin changes or visible lesions. No palpable masses, nipple discharge or adenopathy bilaterally.  PELVIC:   VULVA: No lesions except very tiny hemangioma. Normal female genitalia.  URETHRAL MEATUS: Normal size and location, no lesions, no prolapse.  URETHRA: No masses, tenderness, prolapse or scarring.  VAGINA: Moist and well rugated, no discharge, no significant cystocele or rectocele.  CERVIX: No lesions and discharge.  UTERUS: Normal size, regular shape, mobile, non-tender, bladder base nontender.  ADNEXA: Right ovarian cyst. No tenderness.    PROCEDURES:  Pap smear--Normal 2/2022--defer 2025  STD testing-- Affirm, GC/CT, HIV, RPR, Hepatitis B and C         Assessment:  Normal Gynecologic Exam  Simple right ovarian cyst 7/2023--no follow up required  If has anymore AUB-- can do OCP, provera, estradiol    Plan:  Mammogram and Colonoscopy if indicated by current recommendations.  Return to clinic in one year or for any problems or complaints.    Counseling:  Patient was counseled today on A.C.S. Pap guidelines and recommendations for yearly pelvic exams and monthly self breast exams; to see her PCP for other health maintenance. Regular exercise and healthy diet.     As of April 1, 2021, the Cures Act has been passed nationally. This new law requires that all doctors progress notes, lab results, pathology reports and radiology reports be released IMMEDIATELY to the patient in the patient portal. That means that the results are released to you at the EXACT same time they are released to me. Therefore, with all of the patients that I have I am not able to reply to each patient  exactly when the results come in. So there will be a delay from when you see the results to when I see them and have time to come up with a response to send you. Also I only see these results when I am on the computer at work. So if the results come in over the weekend or after 5 pm of a work day, I will not see them until the next business day. As you can tell, this is a challenge as a physician to give every patient the quick response they hope for and deserve. So please be patient!     Thanks for understanding,

## 2023-08-30 ENCOUNTER — PATIENT MESSAGE (OUTPATIENT)
Dept: OBSTETRICS AND GYNECOLOGY | Facility: CLINIC | Age: 23
End: 2023-08-30
Payer: MEDICAID

## 2023-08-30 ENCOUNTER — HOSPITAL ENCOUNTER (EMERGENCY)
Facility: HOSPITAL | Age: 23
Discharge: HOME | End: 2023-08-30
Attending: EMERGENCY MEDICINE
Payer: MEDICAID

## 2023-08-30 VITALS
WEIGHT: 105 LBS | HEART RATE: 103 BPM | BODY MASS INDEX: 19.2 KG/M2 | RESPIRATION RATE: 18 BRPM | TEMPERATURE: 98 F | SYSTOLIC BLOOD PRESSURE: 138 MMHG | OXYGEN SATURATION: 99 % | DIASTOLIC BLOOD PRESSURE: 62 MMHG

## 2023-08-30 DIAGNOSIS — R21 RASH AND NONSPECIFIC SKIN ERUPTION: ICD-10-CM

## 2023-08-30 DIAGNOSIS — L70.9 ACNE, MILD: Primary | ICD-10-CM

## 2023-08-30 LAB
B-HCG UR QL: NEGATIVE
BACTERIAL VAGINOSIS DNA: NEGATIVE
BILIRUB UR QL STRIP: NEGATIVE
C TRACH DNA SPEC QL NAA+PROBE: NOT DETECTED
CANDIDA GLABRATA DNA: NEGATIVE
CANDIDA KRUSEI DNA: NEGATIVE
CANDIDA RRNA VAG QL PROBE: POSITIVE
CLARITY UR: ABNORMAL
COLOR UR: YELLOW
CTP QC/QA: YES
GLUCOSE UR QL STRIP: NEGATIVE
HGB UR QL STRIP: NEGATIVE
KETONES UR QL STRIP: NEGATIVE
LEUKOCYTE ESTERASE UR QL STRIP: NEGATIVE
N GONORRHOEA DNA SPEC QL NAA+PROBE: NOT DETECTED
NITRITE UR QL STRIP: NEGATIVE
PH UR STRIP: 8 [PH] (ref 5–8)
POCT GLUCOSE: 95 MG/DL (ref 70–110)
PROT UR QL STRIP: NEGATIVE
RPR SER QL: NORMAL
SP GR UR STRIP: 1.01 (ref 1–1.03)
T VAGINALIS RRNA GENITAL QL PROBE: NEGATIVE
URN SPEC COLLECT METH UR: ABNORMAL
UROBILINOGEN UR STRIP-ACNC: NEGATIVE EU/DL

## 2023-08-30 PROCEDURE — 81003 URINALYSIS AUTO W/O SCOPE: CPT | Performed by: NURSE PRACTITIONER

## 2023-08-30 PROCEDURE — 81025 URINE PREGNANCY TEST: CPT | Performed by: NURSE PRACTITIONER

## 2023-08-30 PROCEDURE — 99282 EMERGENCY DEPT VISIT SF MDM: CPT

## 2023-08-30 PROCEDURE — 82962 GLUCOSE BLOOD TEST: CPT

## 2023-08-30 RX ORDER — FLUCONAZOLE 100 MG/1
100 TABLET ORAL
Qty: 3 TABLET | Refills: 0 | Status: SHIPPED | OUTPATIENT
Start: 2023-08-30 | End: 2023-09-06

## 2023-08-30 NOTE — FIRST PROVIDER EVALUATION
Emergency Department TeleTriage Encounter Note      CHIEF COMPLAINT    Chief Complaint   Patient presents with    Rash     Pt reports she saw her gyn yesterday and was told she has a yeast infection. Pt concerned because she also has a rash on her chest. Pt reports she would like to be checked. Pt did not  her prescription.        VITAL SIGNS   Initial Vitals [08/30/23 1505]   BP Pulse Resp Temp SpO2   138/62 103 18 98.3 °F (36.8 °C) 99 %      MAP       --            ALLERGIES    Review of patient's allergies indicates:   Allergen Reactions    No known drug allergies        PROVIDER TRIAGE NOTE  Patient presents with complaint of recent vaginal yeast infection. Also reports rash to chest. Wants to make sure yeast is not in her blood.      Phy:   Constitutional: well nourished, well developed, appearing stated age, NAD   HEENT: NCAT, symmetrical lids, No obvious facial deformity.  Normal phonation. Normal Conjunctiva   Neck: NAROM   Respiratory: Normal effort.  No obvious use of accessory muscles   Musculoskeletal: Moved upper extremities well   Neuro: Alert, answers questions appropriately    Psych: appropriate mood and affect      Initial orders will be placed and care will be transferred to an alternate provider when patient is roomed for a full evaluation. Any additional orders and the final disposition will be determined by that provider.        ORDERS  Labs Reviewed - No data to display    ED Orders (720h ago, onward)      None              Virtual Visit Note: The provider triage portion of this emergency department evaluation and documentation was performed via [employer], a HIPAA-compliant telemedicine application, in concert with a tele-presenter in the room. A face to face patient evaluation with one of my colleagues will occur once the patient is placed in an emergency department room.      DISCLAIMER: This note was prepared with [employer] voice recognition transcription software. Garbled syntax,  mangled pronouns, and other bizarre constructions may be attributed to that software system.

## 2023-08-30 NOTE — ED PROVIDER NOTES
Encounter Date: 8/30/2023       History     Chief Complaint   Patient presents with    Rash     Pt reports she saw her gyn yesterday and was told she has a yeast infection. Pt concerned because she also has a rash on her chest. Pt reports she would like to be checked. Pt did not  her prescription.      Patient is a 23 year old female who currently presents with concerns regarding rash on her chest and upper back for several months. Rash consists of a few small acneform pustules.  There is not associated fever.  Patient/family denies associated itching.  There is not a recent history of new medications or foods.  Patient has been using witch hazel on chest, which has not improved the rash.        The history is provided by the patient.     Review of patient's allergies indicates:   Allergen Reactions    No known drug allergies      Past Medical History:   Diagnosis Date    Allergy     Bipolar affective     Depression     Headache(784.0)     migraine    History of psychiatric hospitalization     Oppositional defiant disorder     Panic attacks     Self-harming behavior     Suicide attempt      Past Surgical History:   Procedure Laterality Date    DILATION AND CURETTAGE OF UTERUS  10/25/2021    termination of pregnancy    INSERTION OF CONTRACEPTIVE CAPSULE  2022     Family History   Problem Relation Age of Onset    Stroke Father 43        unknown cause    Hypertension Father     Anxiety disorder Father     ADD / ADHD Sister     Migraines Mother     Anxiety disorder Mother     Heart attack Maternal Grandmother     Migraines Maternal Grandmother     Heart attack Maternal Grandfather     Stroke Paternal Grandmother     Heart attack Paternal Grandmother     Stroke Paternal Grandfather     Diabetes Neg Hx     Early death Neg Hx     Breast cancer Neg Hx     Colon cancer Neg Hx     Ovarian cancer Neg Hx      Social History     Tobacco Use    Smoking status: Every Day     Types: Vaping with nicotine    Smokeless tobacco:  Current   Substance Use Topics    Alcohol use: Yes     Comment: socially    Drug use: No     Review of Systems   Constitutional:  Negative for fatigue and fever.   Respiratory:  Negative for shortness of breath.    Cardiovascular:  Negative for chest pain.   Gastrointestinal:  Negative for nausea and vomiting.   Musculoskeletal:  Negative for back pain.   Skin:  Positive for rash (mild acneiform rash on chest and upper back.). Negative for color change.   Neurological:  Negative for weakness.   Hematological:  Does not bruise/bleed easily.       Physical Exam     Initial Vitals [08/30/23 1505]   BP Pulse Resp Temp SpO2   138/62 103 18 98.3 °F (36.8 °C) 99 %      MAP       --         Physical Exam    Constitutional: She appears well-developed and well-nourished.   HENT:   Head: Normocephalic and atraumatic.   Eyes: EOM are normal.   Neck:   Normal range of motion.  Cardiovascular:  Normal rate and regular rhythm.           Pulmonary/Chest: Breath sounds normal.   Abdominal: Abdomen is soft.   Musculoskeletal:      Cervical back: Normal range of motion.     Neurological: She is alert and oriented to person, place, and time.   Skin: Rash (mild acneform eruption noted on chest and upper back.) noted. No bruising, no burn and no ecchymosis noted.   Photos in chart.There are no concerning features to suggest acute infection, cellulitis, abscess, or necrotizing fasciitis. The appearance does not suggest Lyme/tick disease, syphilis, scabies/bedbugs, or fungal infection. No necrosis, bullae, or crepitance, negative nikolsky, no mucosal involvement.          ED Course   Procedures  Labs Reviewed   URINALYSIS, REFLEX TO URINE CULTURE - Abnormal; Notable for the following components:       Result Value    Appearance, UA Hazy (*)     All other components within normal limits    Narrative:     Specimen Source->Urine   POCT URINE PREGNANCY   POCT GLUCOSE          Imaging Results    None          Medications - No data to  display  Medical Decision Making  After complete evaluation, including thorough history and physical exam, the patient's symptoms are most consistent with non-specific acneiform eruption. The exact etiology of the patient's rash is currently unknown, but appears to be benign at this time. There are no concerning features to suggest acute infection, cellulitis, abscess, or necrotizing fasciitis. The appearance does not suggest Lyme/tick disease, syphilis, scabies/bedbugs, or fungal infection. Low suspicion for necrotizing infection (including SJS/TEN) given no necrosis, bullae, or crepitance, negative nikolsky, no mucosal involvement.     At this time, I feel there is no emergent condition requiring admission or further testing. Findings and clinical impression discussed with patient.. They were given strict return precautions including to return if rash worsens, fever>100.4, severe pain or any other concerns. Patient instructed to follow up outpatient for ongoing care.  Patient instructed to follow-up with their PCP or dermatology for follow up.  Questions were answered, and patient stated understanding     Amount and/or Complexity of Data Reviewed  Labs: ordered. Decision-making details documented in ED Course.  Discussion of management or test interpretation with external provider(s): Patient care discussed with Dr. Leung, who is agreeable to the plan and patient's disposition.                 ED Course as of 08/30/23 1716   Wed Aug 30, 2023   1643 POCT urine pregnancy [DT]   1643 POCT glucose [DT]   1655 Patient examined and assessed. Very mild acneiform rash noted on chest and upper back. Photos in chart. Patient afebrile and non-toxic appearing. No skin sloughing.  [OB]      ED Course User Index  [DT] Rola Guajardo NP  [OB] Indira Chadwick PA-C                    Clinical Impression:   Final diagnoses:  [R21] Rash and nonspecific skin eruption  [L70.9] Acne, mild (Primary)        ED Disposition  Condition    Discharge Stable          ED Prescriptions    None       Follow-up Information    None          Indira Chadwick PA-C  08/30/23 7443

## 2023-08-30 NOTE — DISCHARGE INSTRUCTIONS
Thank you for letting me care for you today! It was nice meeting you and I hope you feel better soon. Please come back to [facility] for all of your future medical needs.     Our goal in the ER is to always give you outstanding care and exceptional service. You may receive a survey by mail or email in the next week about your experience in our ED. We would greatly appreciate you completing and returning the survey. Your feedback provides us with a way to recognize our staff who give very good care and it helps us learn how to improve when your experience was below our aspiration of excellence.     Sincerely,     Indira Chadwick, MPH, PA-C  Emergency Department Physician Assistant  [facility]

## 2023-08-31 ENCOUNTER — TELEPHONE (OUTPATIENT)
Dept: OBSTETRICS AND GYNECOLOGY | Facility: CLINIC | Age: 23
End: 2023-08-31
Payer: MEDICAID

## 2023-08-31 NOTE — TELEPHONE ENCOUNTER
----- Message from Juve Worthington sent at 8/30/2023  2:25 PM CDT -----  Contact: pt  Type: Requesting to speak with nurse        Who Called: PT  Regarding: discuss results   Would the patient rather a call back or a response via [facility]? Call back  Best Call Back Number: [phone]  Additional Information:

## 2023-09-02 ENCOUNTER — HOSPITAL ENCOUNTER (EMERGENCY)
Facility: OTHER | Age: 23
Discharge: HOME | End: 2023-09-02
Attending: EMERGENCY MEDICINE
Payer: MEDICAID

## 2023-09-02 VITALS
BODY MASS INDEX: 19.32 KG/M2 | WEIGHT: 105 LBS | OXYGEN SATURATION: 100 % | RESPIRATION RATE: 18 BRPM | HEIGHT: 62 IN | TEMPERATURE: 98 F | DIASTOLIC BLOOD PRESSURE: 62 MMHG | SYSTOLIC BLOOD PRESSURE: 115 MMHG | HEART RATE: 84 BPM

## 2023-09-02 DIAGNOSIS — R10.32 LEFT LOWER QUADRANT ABDOMINAL PAIN: Primary | ICD-10-CM

## 2023-09-02 LAB
ALBUMIN SERPL BCP-MCNC: 4.6 G/DL (ref 3.5–5.2)
ALP SERPL-CCNC: 59 U/L (ref 55–135)
ALT SERPL W/O P-5'-P-CCNC: 15 U/L (ref 10–44)
AMORPH CRY URNS QL MICRO: ABNORMAL
ANION GAP SERPL CALC-SCNC: 10 MMOL/L (ref 8–16)
AST SERPL-CCNC: 18 U/L (ref 10–40)
B-HCG UR QL: NEGATIVE
BACTERIA #/AREA URNS HPF: ABNORMAL /HPF
BASOPHILS # BLD AUTO: 0.05 K/UL (ref 0–0.2)
BASOPHILS NFR BLD: 0.8 % (ref 0–1.9)
BILIRUB SERPL-MCNC: 0.6 MG/DL (ref 0.1–1)
BILIRUB UR QL STRIP: NEGATIVE
BUN SERPL-MCNC: 7 MG/DL (ref 6–20)
CALCIUM SERPL-MCNC: 10.2 MG/DL (ref 8.7–10.5)
CHLORIDE SERPL-SCNC: 105 MMOL/L (ref 95–110)
CLARITY UR: ABNORMAL
CO2 SERPL-SCNC: 25 MMOL/L (ref 23–29)
COLOR UR: YELLOW
CREAT SERPL-MCNC: 0.8 MG/DL (ref 0.5–1.4)
CTP QC/QA: YES
DIFFERENTIAL METHOD: ABNORMAL
EOSINOPHIL # BLD AUTO: 0.7 K/UL (ref 0–0.5)
EOSINOPHIL NFR BLD: 10.6 % (ref 0–8)
ERYTHROCYTE [DISTWIDTH] IN BLOOD BY AUTOMATED COUNT: 11.8 % (ref 11.5–14.5)
EST. GFR  (NO RACE VARIABLE): >60 ML/MIN/1.73 M^2
GLUCOSE SERPL-MCNC: 88 MG/DL (ref 70–110)
GLUCOSE UR QL STRIP: NEGATIVE
HCT VFR BLD AUTO: 40.8 % (ref 37–48.5)
HGB BLD-MCNC: 14.3 G/DL (ref 12–16)
HGB UR QL STRIP: NEGATIVE
HYALINE CASTS #/AREA URNS LPF: 0 /LPF
IMM GRANULOCYTES # BLD AUTO: 0.02 K/UL (ref 0–0.04)
IMM GRANULOCYTES NFR BLD AUTO: 0.3 % (ref 0–0.5)
KETONES UR QL STRIP: NEGATIVE
LEUKOCYTE ESTERASE UR QL STRIP: NEGATIVE
LIPASE SERPL-CCNC: 60 U/L (ref 4–60)
LYMPHOCYTES # BLD AUTO: 1.1 K/UL (ref 1–4.8)
LYMPHOCYTES NFR BLD: 16.9 % (ref 18–48)
MCH RBC QN AUTO: 31 PG (ref 27–31)
MCHC RBC AUTO-ENTMCNC: 35 G/DL (ref 32–36)
MCV RBC AUTO: 89 FL (ref 82–98)
MICROSCOPIC COMMENT: ABNORMAL
MONOCYTES # BLD AUTO: 0.7 K/UL (ref 0.3–1)
MONOCYTES NFR BLD: 10.3 % (ref 4–15)
NEUTROPHILS # BLD AUTO: 4.1 K/UL (ref 1.8–7.7)
NEUTROPHILS NFR BLD: 61.1 % (ref 38–73)
NITRITE UR QL STRIP: NEGATIVE
NRBC BLD-RTO: 0 /100 WBC
PH UR STRIP: >8 [PH] (ref 5–8)
PLATELET # BLD AUTO: 277 K/UL (ref 150–450)
PMV BLD AUTO: 9.6 FL (ref 9.2–12.9)
POTASSIUM SERPL-SCNC: 3.8 MMOL/L (ref 3.5–5.1)
PROT SERPL-MCNC: 7.7 G/DL (ref 6–8.4)
PROT UR QL STRIP: ABNORMAL
RBC # BLD AUTO: 4.61 M/UL (ref 4–5.4)
RBC #/AREA URNS HPF: 4 /HPF (ref 0–4)
SODIUM SERPL-SCNC: 140 MMOL/L (ref 136–145)
SP GR UR STRIP: 1.02 (ref 1–1.03)
SQUAMOUS #/AREA URNS HPF: 8 /HPF
URN SPEC COLLECT METH UR: ABNORMAL
UROBILINOGEN UR STRIP-ACNC: NEGATIVE EU/DL
WBC # BLD AUTO: 6.63 K/UL (ref 3.9–12.7)
WBC #/AREA URNS HPF: 47 /HPF (ref 0–5)

## 2023-09-02 PROCEDURE — 25000003 PHARM REV CODE 250: Performed by: PHYSICIAN ASSISTANT

## 2023-09-02 PROCEDURE — 96374 THER/PROPH/DIAG INJ IV PUSH: CPT

## 2023-09-02 PROCEDURE — 81025 URINE PREGNANCY TEST: CPT | Performed by: PHYSICIAN ASSISTANT

## 2023-09-02 PROCEDURE — 99284 EMERGENCY DEPT VISIT MOD MDM: CPT | Mod: 25

## 2023-09-02 PROCEDURE — 85025 COMPLETE CBC W/AUTO DIFF WBC: CPT | Performed by: PHYSICIAN ASSISTANT

## 2023-09-02 PROCEDURE — 81000 URINALYSIS NONAUTO W/SCOPE: CPT | Performed by: PHYSICIAN ASSISTANT

## 2023-09-02 PROCEDURE — 87086 URINE CULTURE/COLONY COUNT: CPT | Performed by: PHYSICIAN ASSISTANT

## 2023-09-02 PROCEDURE — 83690 ASSAY OF LIPASE: CPT | Performed by: PHYSICIAN ASSISTANT

## 2023-09-02 PROCEDURE — 80053 COMPREHEN METABOLIC PANEL: CPT | Performed by: PHYSICIAN ASSISTANT

## 2023-09-02 PROCEDURE — 63600175 PHARM REV CODE 636 W HCPCS

## 2023-09-02 PROCEDURE — 96361 HYDRATE IV INFUSION ADD-ON: CPT

## 2023-09-02 RX ORDER — ONDANSETRON 4 MG/1
4 TABLET, ORALLY DISINTEGRATING ORAL EVERY 8 HOURS PRN
Qty: 15 TABLET | Refills: 0 | Status: SHIPPED | OUTPATIENT
Start: 2023-09-02 | End: 2023-09-07

## 2023-09-02 RX ORDER — KETOROLAC TROMETHAMINE 10 MG/1
10 TABLET, FILM COATED ORAL EVERY 6 HOURS PRN
Qty: 20 TABLET | Refills: 0 | Status: SHIPPED | OUTPATIENT
Start: 2023-09-02 | End: 2023-09-07

## 2023-09-02 RX ORDER — KETOROLAC TROMETHAMINE 30 MG/ML
15 INJECTION, SOLUTION INTRAMUSCULAR; INTRAVENOUS
Status: COMPLETED | OUTPATIENT
Start: 2023-09-02 | End: 2023-09-02

## 2023-09-02 RX ORDER — ONDANSETRON 2 MG/ML
4 INJECTION INTRAMUSCULAR; INTRAVENOUS
Status: DISCONTINUED | OUTPATIENT
Start: 2023-09-02 | End: 2023-09-02 | Stop reason: HOSPADM

## 2023-09-02 RX ORDER — DICYCLOMINE HYDROCHLORIDE 20 MG/1
20 TABLET ORAL 2 TIMES DAILY PRN
Qty: 14 TABLET | Refills: 0 | Status: SHIPPED | OUTPATIENT
Start: 2023-09-02 | End: 2023-09-09

## 2023-09-02 RX ADMIN — SODIUM CHLORIDE 1000 ML: 0.9 INJECTION, SOLUTION INTRAVENOUS at 02:09

## 2023-09-02 RX ADMIN — KETOROLAC TROMETHAMINE 15 MG: 30 INJECTION, SOLUTION INTRAMUSCULAR; INTRAVENOUS at 03:09

## 2023-09-02 NOTE — FIRST PROVIDER EVALUATION
Emergency Department TeleTriage Encounter Note      CHIEF COMPLAINT    Chief Complaint   Patient presents with    Abdominal Pain     Reports L sided abd pain onset 3 days ago. Denies other symptoms.        VITAL SIGNS   Initial Vitals [09/02/23 1355]   BP Pulse Resp Temp SpO2   (!) 140/63 109 18 98.1 °F (36.7 °C) 99 %      MAP       --            ALLERGIES    Review of patient's allergies indicates:   Allergen Reactions    No known drug allergies        PROVIDER TRIAGE NOTE  Patient presents with left sided abdominal pain and nausea for 3 days. No diarrhea or urinary symptoms.       ORDERS  Labs Reviewed - No data to display    ED Orders (720h ago, onward)      None              Virtual Visit Note: The provider triage portion of this emergency department evaluation and documentation was performed via [employer], a HIPAA-compliant telemedicine application, in concert with a tele-presenter in the room. A face to face patient evaluation with one of my colleagues will occur once the patient is placed in an emergency department room.      DISCLAIMER: This note was prepared with [employer] voice recognition transcription software. Garbled syntax, mangled pronouns, and other bizarre constructions may be attributed to that software system.

## 2023-09-02 NOTE — ED PROVIDER NOTES
"Encounter Date: 9/2/2023       History     Chief Complaint   Patient presents with    Abdominal Pain     Reports L sided abd pain onset 3 days ago. Denies other symptoms.      Cydney Patel is a 23 y.o. female with history of self reported IBS and ovarian cysts presenting to the emergency department for evaluation of intermittent, left, lower abdominal pain that began 3 days ago and worsened this morning. She describes the pain as a "sharp cramp". Temporary relief with ibuprofen. She was nauseous this morning but reports symptom has resolved.  States that she had softer stools yesterday.  She denies fever, chills, vomiting, diarrhea, constipation, blood in stool, dysuria, hematuria, urinary frequency, urinary urgency, vaginal bleeding, or vaginal discharge. She does have Nexplanon in place and states her cycles are irregular. States that she was evaluated by her GYN a week ago and had screening tests completed, which were all negative. She does vape with nicotine. She reports occasional use of alcohol. She denies use of illicit substances.      The history is provided by the patient.     Review of patient's allergies indicates:   Allergen Reactions    No known drug allergies      Past Medical History:   Diagnosis Date    Allergy     Bipolar affective     Depression     Headache(784.0)     migraine    History of psychiatric hospitalization     Oppositional defiant disorder     Panic attacks     Self-harming behavior     Suicide attempt      Past Surgical History:   Procedure Laterality Date    DILATION AND CURETTAGE OF UTERUS  10/25/2021    termination of pregnancy    INSERTION OF CONTRACEPTIVE CAPSULE  2022     Family History   Problem Relation Age of Onset    Stroke Father 43        unknown cause    Hypertension Father     Anxiety disorder Father     ADD / ADHD Sister     Migraines Mother     Anxiety disorder Mother     Heart attack Maternal Grandmother     Migraines Maternal Grandmother     Heart attack " Maternal Grandfather     Stroke Paternal Grandmother     Heart attack Paternal Grandmother     Stroke Paternal Grandfather     Diabetes Neg Hx     Early death Neg Hx     Breast cancer Neg Hx     Colon cancer Neg Hx     Ovarian cancer Neg Hx      Social History     Tobacco Use    Smoking status: Every Day     Types: Vaping with nicotine    Smokeless tobacco: Current   Substance Use Topics    Alcohol use: Yes     Comment: socially    Drug use: No     Review of Systems   Constitutional:  Negative for chills and fever.   HENT:  Negative for congestion, rhinorrhea and sore throat.    Respiratory:  Negative for cough and shortness of breath.    Cardiovascular:  Negative for chest pain.   Gastrointestinal:  Positive for abdominal pain and nausea (resolved). Negative for blood in stool, constipation, diarrhea and vomiting.   Genitourinary:  Negative for dysuria, frequency, hematuria, urgency, vaginal bleeding and vaginal discharge.   Musculoskeletal:  Negative for back pain.   Skin:  Negative for rash.   Neurological:  Negative for dizziness and headaches.   Psychiatric/Behavioral:  Negative for confusion.        Physical Exam     Initial Vitals [09/02/23 1355]   BP Pulse Resp Temp SpO2   (!) 140/63 109 18 98.1 °F (36.7 °C) 99 %      MAP       --         Physical Exam    Nursing note and vitals reviewed.  Constitutional: She appears well-developed and well-nourished. No distress.   HENT:   Head: Normocephalic and atraumatic.   Nose: Nose normal.   Mouth/Throat: Oropharynx is clear and moist.   Eyes: Conjunctivae and EOM are normal.   Neck: Neck supple.   Normal range of motion.  Cardiovascular:  Normal rate, regular rhythm, normal heart sounds and intact distal pulses.           Pulmonary/Chest: Breath sounds normal. No respiratory distress. She has no wheezes. She has no rhonchi. She has no rales.   Abdominal: Abdomen is soft. Bowel sounds are normal. She exhibits no distension and no mass. There is abdominal tenderness.    Minimal ttp of left lower quadrant.  There is no rebound and no guarding.   Musculoskeletal:         General: Normal range of motion.      Cervical back: Normal range of motion and neck supple.     Neurological: She is alert and oriented to person, place, and time. She has normal strength.   Skin: Skin is warm and dry.   Psychiatric: She has a normal mood and affect. Her behavior is normal. Judgment and thought content normal.         ED Course   Procedures  Labs Reviewed   CBC W/ AUTO DIFFERENTIAL - Abnormal; Notable for the following components:       Result Value    Eos # 0.7 (*)     Lymph % 16.9 (*)     Eosinophil % 10.6 (*)     All other components within normal limits   URINALYSIS, REFLEX TO URINE CULTURE - Abnormal; Notable for the following components:    Appearance, UA Cloudy (*)     pH, UA >8.0 (*)     Protein, UA 1+ (*)     All other components within normal limits    Narrative:     Specimen Source->Urine   URINALYSIS MICROSCOPIC - Abnormal; Notable for the following components:    WBC, UA 47 (*)     Bacteria Moderate (*)     Amorphous, UA Many (*)     All other components within normal limits    Narrative:     Specimen Source->Urine   CULTURE, URINE   COMPREHENSIVE METABOLIC PANEL   LIPASE   POCT URINE PREGNANCY          Imaging Results    None          Medications   sodium chloride 0.9% bolus 1,000 mL 1,000 mL (0 mLs Intravenous Stopped 9/2/23 1622)   ketorolac injection 15 mg (15 mg Intravenous Given 9/2/23 1522)     Medical Decision Making  Risk  Prescription drug management.                          Medical Decision Making:   Initial Assessment:   Urgent evaluation of 23-year-old female with history of self-reported IBS who presents with left lower abdominal pain for 3 days that worsened this morning.  She denies nausea, vomiting, diarrhea, constipation, urinary symptoms, vaginal bleeding, or vaginal discharge.  Plan for labs.  We will give Toradol.  Clinical Tests:   Lab Tests: Ordered and  Reviewed  ED Management:  On review of labs, no leukocytosis.  H&H is stable.  CMP is grossly unremarkable.  Lipase is within normal limits.  No nitrites or leukocytes on UA.  UPT is negative.  I updated the patient on all results.  On reassessment, she reports her pain is very minimal at this time.  Will give Bentyl.  I explained to her symptoms are likely due to IBS.  Recommended that she follow-up with GI for further management.  Will provide ambulatory referral to GI. Will discharge home with Bentyl and ibuprofen.  Patient verbalized understanding and agreement with this plan of care. She was given specific return precautions. Advised to follow up with PCP as needed. All questions and concerns addressed. She is stable for discharge.       Clinical Impression:   Final diagnoses:  [R10.32] Left lower quadrant abdominal pain (Primary)        ED Disposition Condition    Discharge Stable          ED Prescriptions       Medication Sig Dispense Start Date End Date Auth. Provider    ketorolac (TORADOL) 10 mg tablet Take 1 tablet (10 mg total) by mouth every 6 (six) hours as needed for Pain. 20 tablet 9/2/2023 9/7/2023 Pavan Arias PA-C    dicyclomine (BENTYL) 20 mg tablet Take 1 tablet (20 mg total) by mouth 2 (two) times daily as needed (for abdominal pain/cramping). 14 tablet 9/2/2023 9/9/2023 Pavan Arias PA-C    ondansetron (ZOFRAN-ODT) 4 MG TbDL Take 1 tablet (4 mg total) by mouth every 8 (eight) hours as needed (for nausea). 15 tablet 9/2/2023 9/7/2023 Pavan Arias PA-C          Follow-up Information    None          Pavan Arias PA-C  09/02/23 2201       Pavan Arias PA-C  09/02/23 2201

## 2023-09-02 NOTE — DISCHARGE INSTRUCTIONS
Please take Toradol and Bentyl as needed for pain.  Please take Zofran as needed for nausea.  Make sure you stay hydrated.  Please follow-up with your GI provider next week.

## 2023-09-04 LAB — BACTERIA UR CULT: NORMAL

## 2023-09-14 ENCOUNTER — OFFICE VISIT (OUTPATIENT)
Dept: PSYCHIATRY | Facility: CLINIC | Age: 23
End: 2023-09-14
Payer: MEDICAID

## 2023-09-14 DIAGNOSIS — F31.70 BIPOLAR DISORDER IN PARTIAL REMISSION, MOST RECENT EPISODE UNSPECIFIED TYPE: Primary | ICD-10-CM

## 2023-09-14 DIAGNOSIS — F60.3 BORDERLINE PERSONALITY DISORDER: ICD-10-CM

## 2023-09-14 DIAGNOSIS — F41.9 ANXIETY: ICD-10-CM

## 2023-09-14 DIAGNOSIS — F12.10 MARIJUANA ABUSE: ICD-10-CM

## 2023-09-14 PROCEDURE — 1159F PR MEDICATION LIST DOCUMENTED IN MEDICAL RECORD: ICD-10-PCS | Mod: CPTII,95,, | Performed by: NURSE PRACTITIONER

## 2023-09-14 PROCEDURE — 1160F PR REVIEW ALL MEDS BY PRESCRIBER/CLIN PHARMACIST DOCUMENTED: ICD-10-PCS | Mod: CPTII,95,, | Performed by: NURSE PRACTITIONER

## 2023-09-14 PROCEDURE — 99214 OFFICE O/P EST MOD 30 MIN: CPT | Mod: SA,HB,95, | Performed by: NURSE PRACTITIONER

## 2023-09-14 PROCEDURE — 1160F RVW MEDS BY RX/DR IN RCRD: CPT | Mod: CPTII,95,, | Performed by: NURSE PRACTITIONER

## 2023-09-14 PROCEDURE — 1159F MED LIST DOCD IN RCRD: CPT | Mod: CPTII,95,, | Performed by: NURSE PRACTITIONER

## 2023-09-14 PROCEDURE — 99214 PR OFFICE/OUTPT VISIT, EST, LEVL IV, 30-39 MIN: ICD-10-PCS | Mod: SA,HB,95, | Performed by: NURSE PRACTITIONER

## 2023-09-14 RX ORDER — LAMOTRIGINE 25 MG/1
TABLET ORAL
Qty: 90 TABLET | Refills: 3 | Status: SHIPPED | OUTPATIENT
Start: 2023-09-14 | End: 2023-12-07 | Stop reason: SDUPTHER

## 2023-09-14 RX ORDER — BUSPIRONE HYDROCHLORIDE 15 MG/1
TABLET ORAL
Qty: 30 TABLET | Refills: 3 | Status: SHIPPED | OUTPATIENT
Start: 2023-09-14 | End: 2023-12-07 | Stop reason: SDUPTHER

## 2023-09-14 NOTE — PROGRESS NOTES
"Outpatient Psychiatry Follow-Up Visit (MD/NP) - Telemedicine Visit    9/14/2023 10:14 AM  Cydney Patel  2000  [id number]      The patient location is: Patient reported that her location at the time of this visit was in the [facility]. States she is in a parked car while she is waiting for her sister and mother who are in the store.       Visit type: audiovisual    Each patient to whom he or she provides medical services by telemedicine is:  (1) informed of the relationship between the physician and patient and the respective role of any other health care provider with respect to management of the patient; and (2) notified that he or she may decline to receive medical services by telemedicine and may withdraw from such care at any time.        Chief Complaint:  Cydney Patel, a 23 y.o. female,who presents today for follow up of mood swings, depression, and anxiety.  Met with patient.      Interim Events/Subjective Report/Content of Current Session:     Pt reports that she continues to do well. 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 She is working her way up there. 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 Her son Burton, is 3 yo and is doing well. She is no longer with her boyfriend and is not currently in a romantic relationship. She is getting along well with her mom.    She denies depressed mood, amotivation, anhedonia, and hopelessness. She also denies any sxs of zechariah or hypomania. She does note that when she started taking lamotrigine, she felt "less triggered" and "more emotionally intact". She still feels like the medication is effective, but she feels like it wears off before the day is over and she would like to try increasing the dose today. She has been to the ED once since her last visit for eval of a rash. She states she is very nervous about the possibility of developing SJS, but she " would like to increase the dose of lamotrigine today. She would only like to increase the dose slightly.    She uses Buspar prn anxiety and finds it to be very effective. She states he anxiety is well controlled.    States she continues to be sober from marijuana and other substances.    Sleeping ok. Appetite is good.    Pt is future oriented and denies any SI/HI. Denies AVH, paranoia and delusions. No objective s/sx of psychosis, zechariah, or hypomania.      Psychotherapy:  Target symptoms: mood swings  Why chosen therapy is appropriate versus another modality: relevant to diagnosis, patient responds to this modality  Outcome monitoring methods: self-report, observation  Therapeutic intervention type: supportive psychotherapy  Topics discussed/themes: building skills sets for symptom management  The patient's response to the intervention is accepting. The patient's progress toward treatment goals is good.   Duration of intervention: 5 minutes.      Psychotropic medication review  Previous Trials-  Prozac  Doxepin  Lexapro  Abilify  Vistaril  Cymbalta    Current meds-  Buspar  Lamotrigine      Review of Systems       Review of Systems   Constitutional:  Negative for chills, fever, malaise/fatigue and weight loss.   Respiratory:  Negative for shortness of breath and wheezing.    Cardiovascular:  Negative for chest pain and palpitations.   Gastrointestinal:  Negative for diarrhea and vomiting.   Musculoskeletal:  Negative for falls and myalgias.   Skin:  Negative for rash.   Neurological:  Negative for tremors, seizures and headaches.   Endo/Heme/Allergies:  Does not bruise/bleed easily.   Psychiatric/Behavioral:          See HPI       Past Medical, Family and Social History: The patient's past medical, family and social history have been reviewed and updated as appropriate within the electronic medical record - see encounter notes.    Compliance: yes    Risk Parameters:  Patient reports no suicidal ideation  Patient  reports no homicidal ideation  Patient reports no self-injurious behavior  Patient reports no violent behavior    Exam (detailed: at least 9 elements; comprehensive: all 15 elements)   Constitutional  Vitals:  Most recent vital signs, dated less than 90 days prior to this appointment, were reviewed.   There were no vitals filed for this visit.       Musculoskeletal  Muscle Strength/Tone:  CHAR due to virtual visit   Gait & Station:  CHAR due to virtual visit     Psychiatric      Appearance:  unremarkable, age appropriate, normal weight, well nourished, casually dressed, neatly groomed   Behavior:  normal, friendly and cooperative     Speech:  no latency; no press, spontaneous   Mood & Affect:  euthymic  mood-congruent   Thought Process:  normal and logical   Associations:  intact   Thought Content:  normal, no suicidality, no homicidality, delusions, or paranoia   Insight:  intact   Judgement: behavior is adequate to circumstances, age appropriate   Orientation:  grossly intact   Memory: intact for content of interview   Language: grossly intact   Attention Span & Concentration:  able to focus   Fund of Knowledge:  intact and appropriate to age and level of education     Medications:  Outpatient Encounter Medications as of 2023   Medication Sig Dispense Refill    busPIRone (BUSPAR) 15 MG tablet Take 1 tablet po q am 30 tablet 2    [] dicyclomine (BENTYL) 20 mg tablet Take 1 tablet (20 mg total) by mouth 2 (two) times daily as needed (for abdominal pain/cramping). 14 tablet 0    [] fluconazole (DIFLUCAN) 100 MG tablet Take 1 tablet (100 mg total) by mouth Every 3 (three) days. for 3 doses 3 tablet 0    gabapentin (NEURONTIN) 300 MG capsule Take 1 capsule (300 mg total) by mouth 3 (three) times daily. 90 capsule 11    [] ketorolac (TORADOL) 10 mg tablet Take 1 tablet (10 mg total) by mouth every 6 (six) hours as needed for Pain. 20 tablet 0    lamoTRIgine (LAMICTAL) 25 MG tablet Take 2 tablets  (50 mg) po q am 60 tablet 0    [] ondansetron (ZOFRAN-ODT) 4 MG TbDL Take 1 tablet (4 mg total) by mouth every 8 (eight) hours as needed (for nausea). 15 tablet 0    propranoloL (INDERAL) 10 MG tablet Take 1 tablet (10 mg total) by mouth 3 (three) times daily as needed (Panic Attack). 90 tablet 11    [DISCONTINUED] amoxicillin (AMOXIL) 250 MG capsule Take 250 mg by mouth 3 (three) times daily.      [DISCONTINUED] busPIRone (BUSPAR) 30 MG Tab Take 1 tablet po q hs 30 tablet 3    [DISCONTINUED] lamoTRIgine (LAMICTAL) 25 MG tablet Take 2 tablets (50 mg) po q am 60 tablet 3    [DISCONTINUED] LORATADINE-D  mg per 24 hr tablet Take 1 tablet by mouth.      [DISCONTINUED] methylPREDNISolone (MEDROL DOSEPACK) 4 mg tablet TAKE 6 TABLETS ON DAY 1 AS DIRECTED ON PACKAGE AND DECREASE BY 1 TAB EACH DAY FOR A TOTAL OF 6 DAYS      [DISCONTINUED] triamcinolone acetonide 0.1% (KENALOG) 0.1 % cream Apply topically 2 (two) times daily as needed (for itching). (Patient not taking: Reported on 2023) 15 g 0     Facility-Administered Encounter Medications as of 2023   Medication Dose Route Frequency Provider Last Rate Last Admin    etonogestreL subdermal device 68 mg  68 mg Implant  Charan Barrett Jr., MD   68 mg at 22 1415       Allergy:  Review of patient's allergies indicates:   Allergen Reactions    No known drug allergies          Assessment and Diagnosis   Status/Progress: Based on the examination today, the patient's problem(s) is/are well controlled.  New problems have not been presented today.   Co-morbidities are not complicating management of the primary condition.        General Impression:       ICD-10-CM ICD-9-CM   1. Bipolar disorder in partial remission, most recent episode unspecified type  F31.70 296.80   2. Borderline personality disorder  F60.3 301.83   3. Anxiety  F41.9 300.00   4. Marijuana abuse  F12.10 305.20       Diff Dx:  Bipolar d/o w/rapid cycling  Drug induced mood  d/o      Intervention/Counseling/Treatment Plan     Medication Management: The risks and benefits of medication were discussed with the patient.   Continue Buspar 30 mg q hs   Increase lamotrigine to 75 mg q day  Labs: reviewed most recent   Lamictal: Discussed with patient the possibility of developing Rodriguez-Danilo Syndrome (SJS) with the use of Lamictal. Pt was told to STOP taking the Lamictal immediately and to notify the clinic if any rash develops. Advised if he experiences a large, rapidly expanding, blistering/ulcering, or perioral rash to immediately go to ER or call 911. Pt was told not to use any new products that may cause a rash (ex. changing laundry detergent and soap, skin creams/lotions, perfume, etc). Also informed the pt of the most common side effects of the medication including dizziness, sedation, sleep disturbance, ataxia, somnolence, headache, blurred vision and nausea. Instructed pt that he may develop SJS if he misses more than 4 days of the medicine and will need to begin titration again at 25 mg. The patient expresses understanding of the above and displays the capacity to agree with this treatment given said understanding. Patient also agrees that, currently, the benefits outweigh the risks and would like to pursue treatment at this time.   The treatment plan and follow up plan were reviewed with the patient.  Discussed with patient informed consent, risks vs. benefits, alternative treatments, side effect profile and the inherent unpredictability of individual responses to these treatments. The patient expresses understanding of the above and displays the capacity to agree with this current plan and had no other questions.  Encouraged Patient to keep future appointments.   Take medications as prescribed and abstain from substance abuse.   Present to ED or call 911 for SI/HI plan or intent, psychosis, or medical emergency.      Return to Clinic: 3 months, or sooner if  needed        Face-to-face time with the patient:  15 minutes  Total time:  25 minutes of total time spent on the encounter, which includes face to face time and non-face to face time preparing to see the patient (eg, review of tests), Obtaining and/or reviewing separately obtained history, Documenting clinical information in the electronic or other health record, Independently interpreting results (not separately reported) and communicating results to the patient/family/caregiver, or Care coordination (not separately reported).       Keysha Maldonado, MSN, APRN, PMHNP-BC  [facility]

## 2023-10-25 ENCOUNTER — PATIENT OUTREACH (OUTPATIENT)
Dept: EMERGENCY MEDICINE | Facility: HOSPITAL | Age: 23
End: 2023-10-25
Payer: MEDICAID

## 2023-10-28 ENCOUNTER — PATIENT MESSAGE (OUTPATIENT)
Dept: PSYCHIATRY | Facility: CLINIC | Age: 23
End: 2023-10-28
Payer: MEDICAID

## 2023-10-31 ENCOUNTER — PATIENT MESSAGE (OUTPATIENT)
Dept: OBSTETRICS AND GYNECOLOGY | Facility: CLINIC | Age: 23
End: 2023-10-31
Payer: MEDICAID

## 2023-11-05 ENCOUNTER — NURSE TRIAGE (OUTPATIENT)
Dept: ADMINISTRATIVE | Facility: CLINIC | Age: 23
End: 2023-11-05
Payer: MEDICAID

## 2023-11-05 NOTE — TELEPHONE ENCOUNTER
Reason for Disposition   Warts (Genital) or the HPV Vaccine, questions about    Protocols used: STD Ivboqtgis-Q-SJ  Pt states her boyfriend just told her he has HPV. Pt denies symptoms. Advised  pt to see her OB-GYN. She refused appointment information and states she'll call    her doctor. Instructed to call [facility] back if symptoms develop. She verbalized understanding.

## 2023-11-11 ENCOUNTER — PATIENT MESSAGE (OUTPATIENT)
Dept: OBSTETRICS AND GYNECOLOGY | Facility: CLINIC | Age: 23
End: 2023-11-11
Payer: MEDICAID

## 2023-11-29 ENCOUNTER — OFFICE VISIT (OUTPATIENT)
Dept: FAMILY MEDICINE | Facility: HOSPITAL | Age: 23
End: 2023-11-29
Payer: MEDICAID

## 2023-11-29 VITALS
HEART RATE: 86 BPM | SYSTOLIC BLOOD PRESSURE: 97 MMHG | HEIGHT: 62 IN | WEIGHT: 110.44 LBS | DIASTOLIC BLOOD PRESSURE: 62 MMHG | BODY MASS INDEX: 20.32 KG/M2

## 2023-11-29 DIAGNOSIS — Z23 VACCINE FOR STREPTOCOCCUS PNEUMONIAE AND INFLUENZA: Primary | ICD-10-CM

## 2023-11-29 DIAGNOSIS — Z30.09 BIRTH CONTROL COUNSELING: ICD-10-CM

## 2023-11-29 DIAGNOSIS — M25.552 ACUTE PAIN OF LEFT HIP: ICD-10-CM

## 2023-11-29 PROCEDURE — 90677 PCV20 VACCINE IM: CPT

## 2023-11-29 PROCEDURE — 99214 OFFICE O/P EST MOD 30 MIN: CPT | Performed by: STUDENT IN AN ORGANIZED HEALTH CARE EDUCATION/TRAINING PROGRAM

## 2023-11-29 RX ORDER — CETIRIZINE HYDROCHLORIDE 10 MG/1
10 TABLET ORAL
COMMUNITY
Start: 2023-11-04 | End: 2024-02-15

## 2023-11-29 RX ORDER — FLUTICASONE PROPIONATE 50 MCG
1 SPRAY, SUSPENSION (ML) NASAL
COMMUNITY
Start: 2023-11-05

## 2023-11-29 NOTE — PROGRESS NOTES
[facility]  History & Physical    SUBJECTIVE:     Chief Complaint:   Chief Complaint   Patient presents with    Abdominal Pain     Feels a knot in LLQ x2 weeks       History of Present Illness:  23 y.o. female who  has a past medical history of Allergy, Bipolar affective, Depression, Headache(784.0), History of psychiatric hospitalization, Oppositional defiant disorder, Panic attacks, Self-harming behavior, and Suicide attempt. presents to clinic today for abdominal/ hip pain and birth control discussion. Patient reports pain in her left hip abdomen or pelvis that started about 2 weeks ago. Patient was practicing self defense techniques and was struck in the effect spot. Initial pain was severe and has improved over the 2 week period. Pain responds to NSAIDS, Heat and ice. Patient denies instability in hip or gait, numbness or radiation of pain down leg.       Allergies:  Review of patient's allergies indicates:   Allergen Reactions    No known drug allergies        Home Medications:  Current Outpatient Medications on File Prior to Visit   Medication Sig    busPIRone (BUSPAR) 15 MG tablet Take 1 tablet po q am    cetirizine (ZYRTEC) 10 MG tablet Take 10 mg by mouth.    fluticasone propionate (FLONASE) 50 mcg/actuation nasal spray 1 spray by Each Nostril route.    gabapentin (NEURONTIN) 300 MG capsule Take 1 capsule (300 mg total) by mouth 3 (three) times daily.    lamoTRIgine (LAMICTAL) 25 MG tablet Take 3 tablets (75 mg) po q am    propranoloL (INDERAL) 10 MG tablet Take 1 tablet (10 mg total) by mouth 3 (three) times daily as needed (Panic Attack).     Current Facility-Administered Medications on File Prior to Visit   Medication    etonogestreL subdermal device 68 mg       Past Medical History:   Diagnosis Date    Allergy     Bipolar affective     Depression     Headache(784.0)     migraine    History of psychiatric hospitalization     Oppositional defiant disorder     Panic attacks     Self-harming  behavior     Suicide attempt      Past Surgical History:   Procedure Laterality Date    DILATION AND CURETTAGE OF UTERUS  10/25/2021    termination of pregnancy    INSERTION OF CONTRACEPTIVE CAPSULE  2022     Family History   Problem Relation Age of Onset    Stroke Father 43        unknown cause    Hypertension Father     Anxiety disorder Father     ADD / ADHD Sister     Migraines Mother     Anxiety disorder Mother     Heart attack Maternal Grandmother     Migraines Maternal Grandmother     Heart attack Maternal Grandfather     Stroke Paternal Grandmother     Heart attack Paternal Grandmother     Stroke Paternal Grandfather     Diabetes Neg Hx     Early death Neg Hx     Breast cancer Neg Hx     Colon cancer Neg Hx     Ovarian cancer Neg Hx      Social History     Tobacco Use    Smoking status: Every Day     Types: Vaping with nicotine    Smokeless tobacco: Current   Substance Use Topics    Alcohol use: Yes     Comment: socially    Drug use: No        Review of Systems   Constitutional:  Negative for fever and weight loss.   HENT:  Negative for hearing loss and sore throat.    Eyes:  Negative for blurred vision.   Respiratory:  Negative for cough, shortness of breath and wheezing.    Cardiovascular:  Negative for chest pain and leg swelling.   Gastrointestinal:  Positive for abdominal pain. Negative for heartburn, nausea and vomiting.        Pain located over the left pelvis rather than abdomen   Genitourinary:  Negative for dysuria.   Musculoskeletal:  Positive for myalgias. Negative for back pain.   Neurological:  Negative for dizziness, weakness and headaches.        OBJECTIVE:     Vital Signs:  Pulse: 86 (11/29/23 0857)  BP: 97/62 (11/29/23 0857)  Body mass index is 20.2 kg/m².  Physical Exam  Vitals reviewed.   HENT:      Head: Normocephalic.      Right Ear: External ear normal.      Left Ear: External ear normal.      Nose: Nose normal.      Mouth/Throat:      Mouth: Mucous membranes are moist.   Eyes:       "Extraocular Movements: Extraocular movements intact.      Pupils: Pupils are equal, round, and reactive to light.   Cardiovascular:      Rate and Rhythm: Normal rate.   Pulmonary:      Effort: Pulmonary effort is normal.   Abdominal:      General: Abdomen is flat.   Musculoskeletal:         General: Tenderness present. Normal range of motion.      Cervical back: Normal range of motion.      Comments: Tender to palpation at left side of pelvis medial to ASIS   Skin:     General: Skin is warm.      Capillary Refill: Capillary refill takes less than 2 seconds.   Neurological:      General: No focal deficit present.      Mental Status: She is alert and oriented to person, place, and time.         Laboratory:  No results found for: "HGBA1C"    A/P:  Cydney was seen today for abdominal pain. Suspect pain is muscle pain and bruising secondary to injury. Recommend NSAIDs, heat and Ice to continued to address pain and inflammation. Provided counseling on Birth control options. Patient currently has Nexplanon implant. Patient would like to switch to alternative method but has had bad experiences with Depe-Provera, and Mirena. Patient consider OCP vs ParaGard.     Diagnoses and all orders for this visit:    Vaccine for streptococcus pneumoniae and influenza  -     (In Office Administered) Pneumococcal Conjugate Vaccine (20 Valent) (IM) (Preferred)    Acute pain of left hip    Birth control counseling        Follow up in about 3 months (around 2/29/2024).      Crow Camacho MD  [facility] PGY-3  11/29/2023        "

## 2023-12-06 ENCOUNTER — PATIENT OUTREACH (OUTPATIENT)
Dept: EMERGENCY MEDICINE | Facility: HOSPITAL | Age: 23
End: 2023-12-06
Payer: MEDICAID

## 2023-12-07 ENCOUNTER — OFFICE VISIT (OUTPATIENT)
Dept: PSYCHIATRY | Facility: CLINIC | Age: 23
End: 2023-12-07
Payer: MEDICAID

## 2023-12-07 DIAGNOSIS — F31.70 BIPOLAR DISORDER IN PARTIAL REMISSION, MOST RECENT EPISODE UNSPECIFIED TYPE: Primary | ICD-10-CM

## 2023-12-07 DIAGNOSIS — F41.9 ANXIETY: ICD-10-CM

## 2023-12-07 DIAGNOSIS — F12.10 MARIJUANA ABUSE: ICD-10-CM

## 2023-12-07 DIAGNOSIS — F60.3 BORDERLINE PERSONALITY DISORDER: ICD-10-CM

## 2023-12-07 PROCEDURE — 1159F MED LIST DOCD IN RCRD: CPT | Mod: CPTII,95,, | Performed by: NURSE PRACTITIONER

## 2023-12-07 PROCEDURE — 1159F PR MEDICATION LIST DOCUMENTED IN MEDICAL RECORD: ICD-10-PCS | Mod: CPTII,95,, | Performed by: NURSE PRACTITIONER

## 2023-12-07 PROCEDURE — 1160F PR REVIEW ALL MEDS BY PRESCRIBER/CLIN PHARMACIST DOCUMENTED: ICD-10-PCS | Mod: CPTII,95,, | Performed by: NURSE PRACTITIONER

## 2023-12-07 PROCEDURE — 99214 PR OFFICE/OUTPT VISIT, EST, LEVL IV, 30-39 MIN: ICD-10-PCS | Mod: SA,HB,95, | Performed by: NURSE PRACTITIONER

## 2023-12-07 PROCEDURE — 99214 OFFICE O/P EST MOD 30 MIN: CPT | Mod: SA,HB,95, | Performed by: NURSE PRACTITIONER

## 2023-12-07 PROCEDURE — 1160F RVW MEDS BY RX/DR IN RCRD: CPT | Mod: CPTII,95,, | Performed by: NURSE PRACTITIONER

## 2023-12-07 RX ORDER — LAMOTRIGINE 25 MG/1
TABLET ORAL
Qty: 90 TABLET | Refills: 3 | Status: SHIPPED | OUTPATIENT
Start: 2023-12-07

## 2023-12-07 RX ORDER — BUSPIRONE HYDROCHLORIDE 15 MG/1
TABLET ORAL
Qty: 30 TABLET | Refills: 3 | Status: SHIPPED | OUTPATIENT
Start: 2023-12-07

## 2023-12-07 NOTE — PROGRESS NOTES
Outpatient Psychiatry Follow-Up Visit (MD/NP) - Telemedicine Visit    12/7/2023 10:14 AM  Cydney Patel  2000  [id number]      The patient location is: Patient reported that her location at the time of this visit was in the [facility]. States she is in a parked car while she is waiting for her sister and mother who are in the store.       Visit type: audiovisual    Each patient to whom he or she provides medical services by telemedicine is:  (1) informed of the relationship between the physician and patient and the respective role of any other health care provider with respect to management of the patient; and (2) notified that he or she may decline to receive medical services by telemedicine and may withdraw from such care at any time.        Chief Complaint:  Cydney Patel, a 23 y.o. female,who presents today for follow up of mood swings, depression, and anxiety.  Met with patient.      Interim Events/Subjective Report/Content of Current Session:     Pt reports that she continues to do well. She is now working at a new salon.   She denies depressed mood, amotivation, anhedonia, and hopelessness. She also denies any sxs of zechariah or hypomania.   She missed two days of lamotrigine 2/2 problems at her pharmacy. She is taking 50 mg q day and plans to increase back up to 75 mg daily next month. States she is very worried about SJS so she wants to increase the dose extremely slowly.    She uses Buspar BID anxiety and finds it to be very effective. She states he anxiety is well controlled.    States she continues to be sober from marijuana and other substances.    Sleeping ok. Appetite is good.    Pt is future oriented and denies any SI/HI. Denies AVH, paranoia and delusions. No objective s/sx of psychosis, zechariah, or hypomania.      Psychotherapy:  Target symptoms: mood swings  Why chosen therapy is appropriate versus another modality: relevant to diagnosis, patient responds to this modality  Outcome  monitoring methods: self-report, observation  Therapeutic intervention type: supportive psychotherapy  Topics discussed/themes: building skills sets for symptom management  The patient's response to the intervention is accepting. The patient's progress toward treatment goals is good.   Duration of intervention: 4 minutes.      Psychotropic medication review  Previous Trials-  Prozac  Doxepin  Lexapro  Abilify  Vistaril  Cymbalta    Current meds-  Buspar  Lamotrigine      Review of Systems       Review of Systems   Constitutional:  Negative for chills, fever, malaise/fatigue and weight loss.   Respiratory:  Negative for shortness of breath and wheezing.    Cardiovascular:  Negative for chest pain and palpitations.   Gastrointestinal:  Negative for diarrhea and vomiting.   Musculoskeletal:  Negative for falls and myalgias.   Skin:  Negative for rash.   Neurological:  Negative for tremors, seizures and headaches.   Endo/Heme/Allergies:  Does not bruise/bleed easily.   Psychiatric/Behavioral:          See HPI       Past Medical, Family and Social History: The patient's past medical, family and social history have been reviewed and updated as appropriate within the electronic medical record - see encounter notes.    Compliance: yes    Risk Parameters:  Patient reports no suicidal ideation  Patient reports no homicidal ideation  Patient reports no self-injurious behavior  Patient reports no violent behavior    Exam (detailed: at least 9 elements; comprehensive: all 15 elements)   Constitutional  Vitals:  Most recent vital signs, dated less than 90 days prior to this appointment, were reviewed.   There were no vitals filed for this visit.       Musculoskeletal  Muscle Strength/Tone:  CHAR due to virtual visit   Gait & Station:  CHAR due to virtual visit     Psychiatric      Appearance:  unremarkable, age appropriate, normal weight, well nourished, casually dressed, neatly groomed   Behavior:  normal, friendly and cooperative      Speech:  no latency; no press, spontaneous   Mood & Affect:  euthymic  mood-congruent   Thought Process:  normal and logical   Associations:  intact   Thought Content:  normal, no suicidality, no homicidality, delusions, or paranoia   Insight:  intact   Judgement: behavior is adequate to circumstances, age appropriate   Orientation:  grossly intact   Memory: intact for content of interview   Language: grossly intact   Attention Span & Concentration:  able to focus   Fund of Knowledge:  intact and appropriate to age and level of education     Medications:  Outpatient Encounter Medications as of 12/7/2023   Medication Sig Dispense Refill    busPIRone (BUSPAR) 15 MG tablet Take 1 tablet po q am 30 tablet 3    cetirizine (ZYRTEC) 10 MG tablet Take 10 mg by mouth.      fluticasone propionate (FLONASE) 50 mcg/actuation nasal spray 1 spray by Each Nostril route.      gabapentin (NEURONTIN) 300 MG capsule Take 1 capsule (300 mg total) by mouth 3 (three) times daily. 90 capsule 11    lamoTRIgine (LAMICTAL) 25 MG tablet Take 3 tablets (75 mg) po q am 90 tablet 3    propranoloL (INDERAL) 10 MG tablet Take 1 tablet (10 mg total) by mouth 3 (three) times daily as needed (Panic Attack). 90 tablet 11     Facility-Administered Encounter Medications as of 12/7/2023   Medication Dose Route Frequency Provider Last Rate Last Admin    etonogestreL subdermal device 68 mg  68 mg Implant  Charan Barrett Jr., MD   68 mg at 02/07/22 1415       Allergy:  Review of patient's allergies indicates:   Allergen Reactions    No known drug allergies          Assessment and Diagnosis   Status/Progress: Based on the examination today, the patient's problem(s) is/are well controlled.  New problems have not been presented today.   Co-morbidities are not complicating management of the primary condition.        General Impression:       ICD-10-CM ICD-9-CM   1. Bipolar disorder in partial remission, most recent episode unspecified type  F31.70 296.80    2. Borderline personality disorder  F60.3 301.83   3. Anxiety  F41.9 300.00   4. Marijuana abuse  F12.10 305.20       Diff Dx:  Bipolar d/o w/rapid cycling  Drug induced mood d/o      Intervention/Counseling/Treatment Plan     Medication Management: The risks and benefits of medication were discussed with the patient.   Continue Buspar 15 mg BID   Continue lamotrigine 50 mg q day with plan to increase back up to 75 mg q day in the next few weeks  Labs: reviewed most recent   Lamictal: Discussed with patient the possibility of developing Rodriguez-Danilo Syndrome (SJS) with the use of Lamictal. Pt was told to STOP taking the Lamictal immediately and to notify the clinic if any rash develops. Advised if he experiences a large, rapidly expanding, blistering/ulcering, or perioral rash to immediately go to ER or call 911. Pt was told not to use any new products that may cause a rash (ex. changing laundry detergent and soap, skin creams/lotions, perfume, etc). Also informed the pt of the most common side effects of the medication including dizziness, sedation, sleep disturbance, ataxia, somnolence, headache, blurred vision and nausea. Instructed pt that he may develop SJS if he misses more than 4 days of the medicine and will need to begin titration again at 25 mg. The patient expresses understanding of the above and displays the capacity to agree with this treatment given said understanding. Patient also agrees that, currently, the benefits outweigh the risks and would like to pursue treatment at this time.   The treatment plan and follow up plan were reviewed with the patient.  Discussed with patient informed consent, risks vs. benefits, alternative treatments, side effect profile and the inherent unpredictability of individual responses to these treatments. The patient expresses understanding of the above and displays the capacity to agree with this current plan and had no other questions.  Encouraged Patient to keep  future appointments.   Take medications as prescribed and abstain from substance abuse.   Present to ED or call 911 for SI/HI plan or intent, psychosis, or medical emergency.      Return to Clinic: 3 months, or sooner if needed        Face-to-face time with the patient:  16 minutes  Total time:  25 minutes of total time spent on the encounter, which includes face to face time and non-face to face time preparing to see the patient (eg, review of tests), Obtaining and/or reviewing separately obtained history, Documenting clinical information in the electronic or other health record, Independently interpreting results (not separately reported) and communicating results to the patient/family/caregiver, or Care coordination (not separately reported).       Keysha Maldonado, MSN, APRN, PMHNP-BC  [facility]

## 2024-01-23 ENCOUNTER — OFFICE VISIT (OUTPATIENT)
Dept: OBSTETRICS AND GYNECOLOGY | Facility: CLINIC | Age: 24
End: 2024-01-23
Payer: MEDICAID

## 2024-01-23 VITALS
BODY MASS INDEX: 21.05 KG/M2 | WEIGHT: 115.06 LBS | SYSTOLIC BLOOD PRESSURE: 112 MMHG | DIASTOLIC BLOOD PRESSURE: 70 MMHG

## 2024-01-23 DIAGNOSIS — Z71.1 WORRIED WELL: Primary | ICD-10-CM

## 2024-01-23 PROCEDURE — 1159F MED LIST DOCD IN RCRD: CPT | Mod: CPTII,,, | Performed by: OBSTETRICS & GYNECOLOGY

## 2024-01-23 PROCEDURE — 3008F BODY MASS INDEX DOCD: CPT | Mod: CPTII,,, | Performed by: OBSTETRICS & GYNECOLOGY

## 2024-01-23 PROCEDURE — 99999 PR PBB SHADOW E&M-EST. PATIENT-LVL III: CPT | Mod: PBBFAC,,, | Performed by: OBSTETRICS & GYNECOLOGY

## 2024-01-23 PROCEDURE — 1160F RVW MEDS BY RX/DR IN RCRD: CPT | Mod: CPTII,,, | Performed by: OBSTETRICS & GYNECOLOGY

## 2024-01-23 PROCEDURE — 3078F DIAST BP <80 MM HG: CPT | Mod: CPTII,,, | Performed by: OBSTETRICS & GYNECOLOGY

## 2024-01-23 PROCEDURE — 3074F SYST BP LT 130 MM HG: CPT | Mod: CPTII,,, | Performed by: OBSTETRICS & GYNECOLOGY

## 2024-01-23 PROCEDURE — 99213 OFFICE O/P EST LOW 20 MIN: CPT | Mod: PBBFAC,PO | Performed by: OBSTETRICS & GYNECOLOGY

## 2024-01-23 PROCEDURE — 99212 OFFICE O/P EST SF 10 MIN: CPT | Mod: S$PBB,,, | Performed by: OBSTETRICS & GYNECOLOGY

## 2024-01-23 NOTE — PROGRESS NOTES
OBSTETRIC HISTORY:   OB History          2    Para   1    Term   1            AB   1    Living   1         SAB        IAB        Ectopic        Multiple   0    Live Births   1                  COMPREHENSIVE GYN HISTORY:  PAP History: Denies abnormal Paps.  Infection History: Denies STDs. Denies PID.  Benign History: Denies uterine fibroids. Denies ovarian cysts. Denies endometriosis.   Cancer History: Denies cervical cancer. Denies uterine cancer or hyperplasia. Denies ovarian cancer. Denies vulvar cancer or pre-cancer. Denies vaginal cancer or pre-cancer. Denies breast cancer. Denies colon cancer.  Sexual Activity History:   reports being sexually active and has had partner(s) who are male. She reports using the following method of birth control/protection: None. Nexplanon currently  Menstrual History: Currently has irregular periods with Nexplanon  Dysmenorrhea History: Reports no dysmenorrhea.  Contraception: Nexplanon     HPI:   23 y.o.  Patient's last menstrual period was 2024.   Patient is  here because she saw her vulva and she was not sure if it was normal what she was seeing.    ROS:  GENERAL: No weight gain or weight loss.   CHEST: No shortness of breath.   ABDOMEN: No abdominal pain, constipation, diarrhea, nausea, vomiting or rectal bleeding.   URINARY: No frequency, dysuria, hematuria, or burning on urination.  REPRODUCTIVE: See HPI.   BREASTS: No breast pain, lumps, or nipple discharge.   PSYCHIATRIC: No depression or anxiety.        PE:   /70   Wt 52.2 kg (115 lb 1.3 oz)   LMP 2024   BMI 21.05 kg/m²   APPEARANCE: Well nourished, well developed, in no acute distress.  ABDOMEN: Soft. No tenderness or masses. No hernias.  PELVIC:   VULVA: No lesions. Normal female genitalia.  URETHRAL MEATUS: Normal size and location, no lesions, no prolapse.  URETHRA: No masses, tenderness, prolapse or scarring.      ASSESSMENT/PLAN:  Normal female anatomy-patient given mirror to  point out area of concern--reviewed all areas of anatomy  RTO prn

## 2024-02-09 ENCOUNTER — NURSE TRIAGE (OUTPATIENT)
Dept: ADMINISTRATIVE | Facility: CLINIC | Age: 24
End: 2024-02-09
Payer: MEDICAID

## 2024-02-09 NOTE — TELEPHONE ENCOUNTER
[place]    PCP:  Dr. Crow Camacho    C/O pain with passing stool with bright red blood ~ 1 Tbsp after passing stool (she reports 2 BM's a day for the last 2 wks with rectal bleeding with every passing stool), burning with urination, and lower back pain.  She reports symptoms started after having anal intercourse.  Denies taking blood thinners, abdominal pain, N/V, dizziness, fever, frequency/urgency/dribbling, SOB, and CP.  H/O IBS.  Per protocol, care advised is go to ED now.  Pt VU.  Advised to call for worsening/questions/concerns.  VU.    Reason for Disposition   SEVERE rectal bleeding (large blood clots; constant or on and off bleeding)    Additional Information   Negative: Passed out (i.e., fainted, collapsed and was not responding)   Negative: Shock suspected (e.g., cold/pale/clammy skin, too weak to stand, low BP, rapid pulse)   Negative: Vomiting red blood or black (coffee ground) material   Negative: Sounds like a life-threatening emergency to the triager    Protocols used: Rectal Bleeding-A-OH

## 2024-02-10 ENCOUNTER — HOSPITAL ENCOUNTER (EMERGENCY)
Facility: HOSPITAL | Age: 24
Discharge: HOME | End: 2024-02-10
Attending: EMERGENCY MEDICINE
Payer: MEDICAID

## 2024-02-10 VITALS
SYSTOLIC BLOOD PRESSURE: 118 MMHG | BODY MASS INDEX: 20.67 KG/M2 | HEART RATE: 63 BPM | RESPIRATION RATE: 16 BRPM | TEMPERATURE: 98 F | WEIGHT: 113 LBS | DIASTOLIC BLOOD PRESSURE: 60 MMHG | OXYGEN SATURATION: 99 %

## 2024-02-10 DIAGNOSIS — F07.81 CONCUSSION SYNDROME: Primary | ICD-10-CM
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
## 2024-02-10 LAB
B-HCG UR QL: NEGATIVE
CTP QC/QA: YES

## 2024-02-10 PROCEDURE — 81025 URINE PREGNANCY TEST: CPT

## 2024-02-10 PROCEDURE — 99284 EMERGENCY DEPT VISIT MOD MDM: CPT | Mod: 25
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
## 2024-02-13 ENCOUNTER — NURSE TRIAGE (OUTPATIENT)
Dept: ADMINISTRATIVE | Facility: CLINIC | Age: 24
End: 2024-02-13
Payer: MEDICAID

## 2024-02-13 NOTE — TELEPHONE ENCOUNTER
"Pt states she was in Ed Saturday  for concussion syndrome had Ct and was told CT was negative pt reports she is still experiencing lightheadedness. Per protocol instructed pt to be seen by pcp within 24 hrs.   Reason for Disposition   Taking a medicine that could cause dizziness (e.g., blood pressure medications, diuretics)    Additional Information   Negative: SEVERE difficulty breathing (e.g., struggling for each breath, speaks in single words)   Negative: [1] Difficulty breathing or swallowing AND [2] started suddenly after medicine, an allergic food or bee sting   Negative: Shock suspected (e.g., cold/pale/clammy skin, too weak to stand, low BP, rapid pulse)   Negative: Difficult to awaken or acting confused (e.g., disoriented, slurred speech)   Negative: [1] Weakness (i.e., paralysis, loss of muscle strength) of the face, arm or leg on one side of the body AND [2] sudden onset AND [3] present now   Negative: [1] Numbness (i.e., loss of sensation) of the face, arm or leg on one side of the body AND [2] sudden onset AND [3] present now   Negative: [1] Loss of speech or garbled speech AND [2] sudden onset AND [3] present now   Negative: Overdose (accidental or intentional) of medications   Negative: [1] Fainted > 15 minutes ago AND [2] still feels too weak or dizzy to stand   Negative: Heart beating < 50 beats per minute OR > 140 beats per minute   Negative: Sounds like a life-threatening emergency to the triager   Negative: Difficulty breathing   Negative: SEVERE dizziness (e.g., unable to stand, requires support to walk, feels like passing out now)   Negative: Extra heartbeats, irregular heart beating, or heart is beating very fast  (i.e., "palpitations")   Negative: [1] Drinking very little AND [2] dehydration suspected (e.g., no urine > 12 hours, very dry mouth, very lightheaded)   Negative: [1] Weakness (i.e., paralysis, loss of muscle strength) of the face, arm / hand, or leg / foot on one side of the body " AND [2] sudden onset AND [3] brief (now gone)   Negative: [1] Numbness (i.e., loss of sensation) of the face, arm / hand, or leg / foot on one side of the body AND [2] sudden onset AND [3] brief (now gone)   Negative: [1] Loss of speech or garbled speech AND [2] sudden onset AND [3] brief (now gone)   Negative: Loss of vision or double vision  (Exception: Similar to previous migraines.)   Negative: Patient sounds very sick or weak to the triager   Negative: [1] Dizziness caused by heat exposure, sudden standing, or poor fluid intake AND [2] no improvement after 2 hours of rest and fluids   Negative: [1] Fever > 103 F (39.4 C) AND [2] not able to get the fever down using Fever Care Advice   Negative: [1] Fever > 101 F (38.3 C) AND [2] age > 60 years   Negative: [1] Fever > 100.0 F (37.8 C) AND [2] bedridden (e.g., CVA, chronic illness, recovering from surgery)   Negative: [1] Fever > 100.0 F (37.8 C) AND [2] diabetes mellitus or weak immune system (e.g., HIV positive, cancer chemo, splenectomy, organ transplant, chronic steroids)   Negative: [1] MODERATE dizziness (e.g., interferes with normal activities) AND [2] has NOT been evaluated by doctor (or NP/PA) for this  (Exception: Dizziness caused by heat exposure, sudden standing, or poor fluid intake.)   Negative: Fever present > 3 days (72 hours)    Protocols used: Dizziness - [id number]

## 2024-02-15 ENCOUNTER — OFFICE VISIT (OUTPATIENT)
Dept: FAMILY MEDICINE | Facility: HOSPITAL | Age: 24
End: 2024-02-15
Payer: MEDICAID

## 2024-02-15 VITALS
HEIGHT: 62 IN | TEMPERATURE: 99 F | HEART RATE: 74 BPM | DIASTOLIC BLOOD PRESSURE: 57 MMHG | SYSTOLIC BLOOD PRESSURE: 92 MMHG | WEIGHT: 113.56 LBS | BODY MASS INDEX: 20.9 KG/M2

## 2024-02-15 DIAGNOSIS — M79.18 CERVICAL MYOFASCIAL PAIN SYNDROME: ICD-10-CM

## 2024-02-15 DIAGNOSIS — J32.9 RHINOSINUSITIS: Primary | ICD-10-CM

## 2024-02-15 PROCEDURE — 99213 OFFICE O/P EST LOW 20 MIN: CPT

## 2024-02-15 RX ORDER — CETIRIZINE HYDROCHLORIDE 10 MG/1
10 TABLET ORAL DAILY
Qty: 90 TABLET | Refills: 1 | Status: SHIPPED | OUTPATIENT
Start: 2024-02-15 | End: 2024-08-13
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
## 2024-03-16 ENCOUNTER — HOSPITAL ENCOUNTER (EMERGENCY)
Facility: HOSPITAL | Age: 24
Discharge: HOME | End: 2024-03-16
Attending: EMERGENCY MEDICINE
Payer: MEDICAID

## 2024-03-16 VITALS
RESPIRATION RATE: 18 BRPM | TEMPERATURE: 98 F | OXYGEN SATURATION: 100 % | SYSTOLIC BLOOD PRESSURE: 105 MMHG | HEART RATE: 68 BPM | DIASTOLIC BLOOD PRESSURE: 63 MMHG

## 2024-03-16 DIAGNOSIS — R10.9 ABDOMINAL PAIN: ICD-10-CM

## 2024-03-16 DIAGNOSIS — R10.32 LEFT LOWER QUADRANT ABDOMINAL PAIN: Primary | ICD-10-CM

## 2024-03-16 LAB
B-HCG UR QL: NEGATIVE
BILIRUB UR QL STRIP: NEGATIVE
CLARITY UR: CLEAR
COLOR UR: YELLOW
CTP QC/QA: YES
GLUCOSE UR QL STRIP: NEGATIVE
HGB UR QL STRIP: NEGATIVE
KETONES UR QL STRIP: NEGATIVE
LEUKOCYTE ESTERASE UR QL STRIP: NEGATIVE
NITRITE UR QL STRIP: NEGATIVE
PH UR STRIP: 6 [PH] (ref 5–8)
PROT UR QL STRIP: ABNORMAL
SP GR UR STRIP: 1.02 (ref 1–1.03)
URN SPEC COLLECT METH UR: ABNORMAL
UROBILINOGEN UR STRIP-ACNC: NEGATIVE EU/DL

## 2024-03-16 PROCEDURE — 63600175 PHARM REV CODE 636 W HCPCS: Performed by: EMERGENCY MEDICINE

## 2024-03-16 PROCEDURE — 81025 URINE PREGNANCY TEST: CPT

## 2024-03-16 PROCEDURE — 99284 EMERGENCY DEPT VISIT MOD MDM: CPT | Mod: 25

## 2024-03-16 PROCEDURE — 81003 URINALYSIS AUTO W/O SCOPE: CPT | Performed by: EMERGENCY MEDICINE

## 2024-03-16 PROCEDURE — 96372 THER/PROPH/DIAG INJ SC/IM: CPT | Performed by: EMERGENCY MEDICINE

## 2024-03-16 RX ORDER — KETOROLAC TROMETHAMINE 30 MG/ML
30 INJECTION, SOLUTION INTRAMUSCULAR; INTRAVENOUS
Status: COMPLETED | OUTPATIENT
Start: 2024-03-16 | End: 2024-03-16

## 2024-03-16 RX ORDER — METOCLOPRAMIDE HYDROCHLORIDE 5 MG/ML
10 INJECTION INTRAMUSCULAR; INTRAVENOUS
Status: DISCONTINUED | OUTPATIENT
Start: 2024-03-16 | End: 2024-03-16

## 2024-03-16 RX ORDER — LACTULOSE 10 G/15ML
10 SOLUTION ORAL EVERY 8 HOURS PRN
Qty: 150 ML | Refills: 0 | Status: SHIPPED | OUTPATIENT
Start: 2024-03-16

## 2024-03-16 RX ORDER — DICYCLOMINE HYDROCHLORIDE 20 MG/1
20 TABLET ORAL 3 TIMES DAILY PRN
Qty: 14 TABLET | Refills: 0 | Status: SHIPPED | OUTPATIENT
Start: 2024-03-16 | End: 2024-04-15

## 2024-03-16 RX ORDER — PROCHLORPERAZINE EDISYLATE 5 MG/ML
10 INJECTION INTRAMUSCULAR; INTRAVENOUS
Status: COMPLETED | OUTPATIENT
Start: 2024-03-16 | End: 2024-03-16

## 2024-03-16 RX ORDER — ALUMINUM HYDROXIDE, MAGNESIUM HYDROXIDE, AND SIMETHICONE 1200; 120; 1200 MG/30ML; MG/30ML; MG/30ML
30 SUSPENSION ORAL ONCE
Status: DISCONTINUED | OUTPATIENT
Start: 2024-03-16 | End: 2024-03-16

## 2024-03-16 RX ADMIN — PROCHLORPERAZINE EDISYLATE 10 MG: 5 INJECTION INTRAMUSCULAR; INTRAVENOUS at 11:03

## 2024-03-16 RX ADMIN — KETOROLAC TROMETHAMINE 30 MG: 30 INJECTION, SOLUTION INTRAMUSCULAR; INTRAVENOUS at 11:03

## 2024-03-16 NOTE — ED PROVIDER NOTES
Encounter Date: 3/16/2024       History     Chief Complaint   Patient presents with    Abdominal Pain     Pt presents to ED today c/o LLQ pain x 2 days reports hx of IBS   Denies taking medication for sx  + N/V/D/C      23-year-old female presents to the emergency department complaining of abdominal pain.  Describes a cramping and aching left lower quadrant abdominal pain that has gradually worsened over the past few days.  Symptoms were initially intermittent but has been constant today.  No relief with over-the-counter medicines.  Notes nausea with 1 episode of nonbloody, nonbilious emesis.  Notes intermittent constipation and diarrhea, states she is history of IBS.  Denies any fever, dysuria, frequency, urgency.  No other symptoms reported at this time.      Review of patient's allergies indicates:   Allergen Reactions    No known drug allergies      Past Medical History:   Diagnosis Date    Allergy     Bipolar affective     Depression     Headache(784.0)     migraine    History of psychiatric hospitalization     Oppositional defiant disorder     Panic attacks     Self-harming behavior     Suicide attempt      Past Surgical History:   Procedure Laterality Date    DILATION AND CURETTAGE OF UTERUS  10/25/2021    termination of pregnancy    INSERTION OF CONTRACEPTIVE CAPSULE  2022     Family History   Problem Relation Age of Onset    Stroke Father 43        unknown cause    Hypertension Father     Anxiety disorder Father     ADD / ADHD Sister     Migraines Mother     Anxiety disorder Mother     Heart attack Maternal Grandmother     Migraines Maternal Grandmother     Heart attack Maternal Grandfather     Stroke Paternal Grandmother     Heart attack Paternal Grandmother     Stroke Paternal Grandfather     Diabetes Neg Hx     Early death Neg Hx     Breast cancer Neg Hx     Colon cancer Neg Hx     Ovarian cancer Neg Hx      Social History     Tobacco Use    Smoking status: Every Day     Types: Vaping with nicotine     Smokeless tobacco: Current   Substance Use Topics    Alcohol use: Yes     Comment: socially    Drug use: No     Review of Systems   Constitutional:  Negative for chills and fever.   HENT:  Negative for congestion.    Respiratory:  Negative for cough and shortness of breath.    Cardiovascular:  Negative for chest pain.   Gastrointestinal:  Positive for abdominal pain.   Musculoskeletal:  Negative for back pain.   Neurological:  Negative for light-headedness and headaches.       Physical Exam     Initial Vitals [03/16/24 0922]   BP Pulse Resp Temp SpO2   (!) 102/58 66 16 98.1 °F (36.7 °C) 100 %      MAP       --         Physical Exam    Nursing note and vitals reviewed.  Constitutional: She appears well-developed and well-nourished. No distress.   HENT:   Head: Normocephalic and atraumatic.   Eyes: Conjunctivae and EOM are normal. Pupils are equal, round, and reactive to light.   Neck: Neck supple. No tracheal deviation present.   Normal range of motion.  Cardiovascular:  Normal rate and intact distal pulses.           Pulmonary/Chest: No respiratory distress.   Abdominal: Abdomen is soft. She exhibits no distension. There is abdominal tenderness (Left lower quadrant). There is no rebound and no guarding.   Musculoskeletal:         General: No tenderness or edema. Normal range of motion.      Cervical back: Normal range of motion and neck supple.     Neurological: She is alert and oriented to person, place, and time. She has normal strength. No cranial nerve deficit. GCS score is 15. GCS eye subscore is 4. GCS verbal subscore is 5. GCS motor subscore is 6.   Skin: Skin is warm and dry.         ED Course   Procedures  Labs Reviewed   URINALYSIS - Abnormal; Notable for the following components:       Result Value    Protein, UA Trace (*)     All other components within normal limits   POCT URINE PREGNANCY              X-Rays:   Independently Interpreted Readings:   Other Readings:  X-ray abdomen flat and erect  independently interpreted by me pending radiology review: Nonobstructive bowel gas pattern, no free air    Imaging Results              X-Ray Abdomen Flat And Erect (Final result)  Result time 03/16/24 11:11:53      Final result by Joe Muro MD (03/16/24 11:11:53)                   Impression:      Nonobstructive bowel gas pattern.    Possible foreign body in the left pelvis/abdomen.  Further evaluation could be performed with CT, if indicated.      Electronically signed by: Joe Muro MD  Date:    03/16/2024  Time:    11:11               Narrative:    EXAMINATION:  XR ABDOMEN FLAT AND ERECT    CLINICAL HISTORY:  Unspecified abdominal pain    TECHNIQUE:  Flat and erect AP views of the abdomen were performed.    COMPARISON:  05/28/2008    FINDINGS:  No dilated loops of small bowel identified.No differential air fluid levels.  No findings to suggest free air.  No pathologic calcifications. Possible foreign body in the left pelvis/abdomen.                                      Medications   ketorolac injection 30 mg (30 mg Intramuscular Given 3/16/24 1124)   prochlorperazine injection Soln 10 mg (10 mg Intramuscular Given 3/16/24 1125)     Medical Decision Making  23-year-old female presents emergency department complaining of abdominal pain      Differential: Diverticulitis, cholecystitis, pancreatitis, appendicitis, obstruction, constipation UTI, pyelonephritis, kidney stone, gastroenteritis, pregnancy, threatened versus inevitable versus missed miscarriage, ectopic      Patient given IM Toradol and Compazine.  On re-evaluation she reports significant improvement in symptoms and vital signs remained stable.  Informed her of results as well as plan to discharge with prescriptions for Bentyl and lactulose, instructed on home management, over-the-counter medications, follow up with the primary care physician, strict return precautions given.  Patient expressed good understanding and is comfortable with discharge  at this time.  Vital signs stable.    Problems Addressed:  Left lower quadrant abdominal pain: acute illness or injury    Amount and/or Complexity of Data Reviewed  External Data Reviewed: notes.     Details: Reviewed most recent PCP note documenting baseline medications and past medical history  Labs: ordered.     Details: UPT negative; UA without overt signs of infection  Radiology: ordered and independent interpretation performed. Decision-making details documented in ED Course.    Risk  OTC drugs.  Prescription drug management.  Parenteral controlled substances.                                      Clinical Impression:  Final diagnoses:  [R10.9] Abdominal pain  [R10.32] Left lower quadrant abdominal pain (Primary)          ED Disposition Condition    Discharge Stable          ED Prescriptions       Medication Sig Dispense Start Date End Date Auth. Provider    dicyclomine (BENTYL) 20 mg tablet Take 1 tablet (20 mg total) by mouth 3 (three) times daily as needed (for abdominal cramping). 14 tablet 3/16/2024 4/15/2024 Troy Chiu MD    lactulose (CHRONULAC) 20 gram/30 mL Soln Take 15 mLs (10 g total) by mouth every 8 (eight) hours as needed (Constipation). 150 mL 3/16/2024 -- Troy Chiu MD          Follow-up Information       Follow up With Specialties Details Why Contact Info    Crow Camacho MD Family Medicine Schedule an appointment as soon as possible for a visit   [address] [address] [phone]               Troy Chiu MD  03/16/24 9260

## 2024-04-26 ENCOUNTER — OFFICE VISIT (OUTPATIENT)
Dept: URGENT CARE | Facility: CLINIC | Age: 24
End: 2024-04-26
Payer: MEDICAID

## 2024-04-26 VITALS
HEART RATE: 72 BPM | SYSTOLIC BLOOD PRESSURE: 112 MMHG | DIASTOLIC BLOOD PRESSURE: 70 MMHG | TEMPERATURE: 98 F | WEIGHT: 113 LBS | RESPIRATION RATE: 20 BRPM | OXYGEN SATURATION: 98 % | BODY MASS INDEX: 20.8 KG/M2 | HEIGHT: 62 IN

## 2024-04-26 DIAGNOSIS — Z11.3 SCREEN FOR STD (SEXUALLY TRANSMITTED DISEASE): ICD-10-CM

## 2024-04-26 DIAGNOSIS — N30.01 ACUTE CYSTITIS WITH HEMATURIA: Primary | ICD-10-CM

## 2024-04-26 LAB
B-HCG UR QL: NEGATIVE
BILIRUB UR QL STRIP: NEGATIVE
CTP QC/QA: YES
GLUCOSE UR QL STRIP: NEGATIVE
KETONES UR QL STRIP: NEGATIVE
LEUKOCYTE ESTERASE UR QL STRIP: POSITIVE
PH, POC UA: 6.5 (ref 5–8)
POC BLOOD, URINE: POSITIVE
POC NITRATES, URINE: NEGATIVE
PROT UR QL STRIP: NEGATIVE
SP GR UR STRIP: 1.02 (ref 1–1.03)
UROBILINOGEN UR STRIP-ACNC: ABNORMAL (ref 0.1–1.1)

## 2024-04-26 PROCEDURE — 87491 CHLMYD TRACH DNA AMP PROBE: CPT | Performed by: NURSE PRACTITIONER

## 2024-04-26 PROCEDURE — 81003 URINALYSIS AUTO W/O SCOPE: CPT | Mod: QW,S$GLB,, | Performed by: NURSE PRACTITIONER

## 2024-04-26 PROCEDURE — 81514 NFCT DS BV&VAGINITIS DNA ALG: CPT | Performed by: NURSE PRACTITIONER

## 2024-04-26 PROCEDURE — 99213 OFFICE O/P EST LOW 20 MIN: CPT | Mod: S$GLB,,, | Performed by: NURSE PRACTITIONER

## 2024-04-26 PROCEDURE — 81025 URINE PREGNANCY TEST: CPT | Mod: S$GLB,,, | Performed by: NURSE PRACTITIONER

## 2024-04-26 RX ORDER — NITROFURANTOIN 25; 75 MG/1; MG/1
100 CAPSULE ORAL 2 TIMES DAILY
Qty: 10 CAPSULE | Refills: 0 | Status: SHIPPED | OUTPATIENT
Start: 2024-04-26 | End: 2024-05-01

## 2024-04-26 NOTE — PROGRESS NOTES
"Subjective:      Patient ID: Cydney Patel is a 24 y.o. female.    Vitals:  height is 5' 2" (1.575 m) and weight is 51.3 kg (113 lb). Her oral temperature is 98 °F (36.7 °C). Her blood pressure is 112/70 and her pulse is 72. Her respiration is 20 and oxygen saturation is 98%.     Chief Complaint: Urinary Tract Infection and STD CHECK    Pt states that 3 days ago she started with dysuria, frequency,back pain , denies fever, she denies any discharge, she has a new partner.  She also is requesting std screening  No previous std ex  Provider note below:  This is a 24 y.o. female who presents today with a chief complaint of dysuria, frequency and back pain started 2-3 days ago, denies flank pain, denies urinary urgency, denies any vaginal pain or discharge but would like to get tested for STD as she has a new partner, denies fever, body aches or chills, denies cough, wheezing or shortness of breath, denies nausea, vomiting, diarrhea or abdominal pain, denies chest pain or dizziness positional lightheadedness, denies sore throat or trouble swallowing, denies loss of taste or smell, or any other symptoms       Urinary Tract Infection   This is a new problem. The current episode started in the past 7 days. The problem occurs every urination. The problem has been gradually worsening. The patient is experiencing no pain. There has been no fever. She is Sexually active. There is No history of pyelonephritis. Associated symptoms include frequency and urgency. Pertinent negatives include no discharge. She has tried increased fluids for the symptoms.       Genitourinary:  Positive for frequency and urgency.      Objective:     Physical Exam   Constitutional: She is oriented to person, place, and time. She appears well-developed.   HENT:   Head: Normocephalic and atraumatic.   Ears:   Right Ear: External ear normal.   Left Ear: External ear normal.   Nose: Nose normal. No nasal deformity. No epistaxis.   Mouth/Throat: " Oropharynx is clear and moist and mucous membranes are normal.   Eyes: Lids are normal.   Neck: Trachea normal and phonation normal. Neck supple.   Cardiovascular: Normal rate, regular rhythm and normal pulses.   Pulmonary/Chest: Effort normal and breath sounds normal.   Abdominal: Normal appearance and bowel sounds are normal. She exhibits no distension. Soft. There is no abdominal tenderness. There is no left CVA tenderness and no right CVA tenderness.   Neurological: She is alert and oriented to person, place, and time.   Skin: Skin is warm, dry and intact.   Psychiatric: Her speech is normal and behavior is normal.   Nursing note and vitals reviewed.    Results for orders placed or performed in visit on 04/26/24   POCT Urinalysis, Dipstick, Automated, W/O Scope   Result Value Ref Range    POC Blood, Urine Positive (A) Negative    POC Bilirubin, Urine Negative Negative    POC Urobilinogen, Urine Norm 0.1 - 1.1    POC Ketones, Urine Negative Negative    POC Protein, Urine Negative Negative    POC Nitrates, Urine Negative Negative    POC Glucose, Urine Negative Negative    pH, UA 6.5 5 - 8    POC Specific Gravity, Urine 1.020 1.003 - 1.029    POC Leukocytes, Urine Positive (A) Negative   POCT urine pregnancy   Result Value Ref Range    POC Preg Test, Ur Negative Negative     Acceptable Yes          Patient in no acute distress.  Vitals reassuring.  Urinalysis and UPT as above.  Will treat for cystitis.  No CVA tenderness.  Will call Back results in 3-5 days.  STD precautions discussed with patient in detail  Discussed results/diagnosis/plan in depth with patient in clinic. Strict precautions given to patient to monitor for worsening signs and symptoms. Advised to follow up with primary.All questions answered. Strict ER precautions given. If your symptoms worsens or fail to improve you should go to the Emergency Room. Discharge and follow-up instructions given verbally/printed. Discharge and follow-up  instructions discussed with the patient who expressed understanding and willingness to comply with my recommendations.Patient voiced understanding and in agreement with current treatment plan.     Please be advised this text was dictated with [employer] software and may contain errors due to translation.     Assessment:     1. Acute cystitis with hematuria    2. Screen for STD (sexually transmitted disease)        Plan:       Acute cystitis with hematuria  -     POCT Urinalysis, Dipstick, Automated, W/O Scope  -     POCT urine pregnancy  -     nitrofurantoin, macrocrystal-monohydrate, (MACROBID) 100 MG capsule; Take 1 capsule (100 mg total) by mouth 2 (two) times daily. for 5 days  Dispense: 10 capsule; Refill: 0    Screen for STD (sexually transmitted disease)  -     Vaginosis Screen by DNA Probe  -     C. trachomatis/N. gonorrhoeae by AMP DNA                  Patient Instructions   PLEASE READ YOUR DISCHARGE INSTRUCTIONS ENTIRELY AS IT CONTAINS IMPORTANT INFORMATION.      Take the antibiotics to completion.     Drink plenty of fluids, wipe front to back, take showers not baths, no scented soaps, wear breathable cotton underwear, urinate after sexual intercourse.     Please go to the ER for worsening symptoms including fever, worsening flank pain, vomiting, etc.       Please return or see your primary care doctor if you develop new or worsening symptoms.     Please arrange follow up with your primary medical clinic as soon as possible. You must understand that you've received an Urgent Care treatment only and that you may be released before all of your medical problems are known or treated. You, the patient, will arrange for follow up as instructed. If your symptoms worsen or fail to improve you should go to the Emergency Room.  WE CANNOT RULE OUT ALL POSSIBLE CAUSES OF YOUR SYMPTOMS IN THE URGENT CARE SETTING PLEASE GO TO THE ER IF YOU FEELS YOUR CONDITION IS WORSENING OR YOU WOULD LIKE EMERGENT EVALUATION.

## 2024-04-27 ENCOUNTER — TELEPHONE (OUTPATIENT)
Dept: URGENT CARE | Facility: CLINIC | Age: 24
End: 2024-04-27
Payer: MEDICAID

## 2024-04-27 LAB
BACTERIAL VAGINOSIS DNA: NEGATIVE
CANDIDA GLABRATA DNA: NEGATIVE
CANDIDA KRUSEI DNA: NEGATIVE
CANDIDA RRNA VAG QL PROBE: NEGATIVE
T VAGINALIS RRNA GENITAL QL PROBE: NEGATIVE

## 2024-04-29 ENCOUNTER — PATIENT MESSAGE (OUTPATIENT)
Dept: OBSTETRICS AND GYNECOLOGY | Facility: CLINIC | Age: 24
End: 2024-04-29
Payer: MEDICAID

## 2024-04-30 LAB
C TRACH DNA SPEC QL NAA+PROBE: NOT DETECTED
N GONORRHOEA DNA SPEC QL NAA+PROBE: NOT DETECTED

## 2024-04-30 NOTE — TELEPHONE ENCOUNTER
The antibiotic can change the color of the urine. She needs to increase her water intake especially since she had a bladder infection

## 2024-05-02 ENCOUNTER — OFFICE VISIT (OUTPATIENT)
Dept: OBSTETRICS AND GYNECOLOGY | Facility: CLINIC | Age: 24
End: 2024-05-02
Payer: MEDICAID

## 2024-05-02 ENCOUNTER — LAB VISIT (OUTPATIENT)
Dept: LAB | Facility: HOSPITAL | Age: 24
End: 2024-05-02
Attending: OBSTETRICS & GYNECOLOGY
Payer: MEDICAID

## 2024-05-02 VITALS
BODY MASS INDEX: 21.23 KG/M2 | WEIGHT: 116.06 LBS | DIASTOLIC BLOOD PRESSURE: 70 MMHG | SYSTOLIC BLOOD PRESSURE: 117 MMHG

## 2024-05-02 DIAGNOSIS — Z01.419 WELL WOMAN EXAM WITH ROUTINE GYNECOLOGICAL EXAM: Primary | ICD-10-CM

## 2024-05-02 DIAGNOSIS — Z72.89 OTHER PROBLEMS RELATED TO LIFESTYLE: ICD-10-CM

## 2024-05-02 DIAGNOSIS — Z12.4 ROUTINE CERVICAL SMEAR: ICD-10-CM

## 2024-05-02 DIAGNOSIS — Z11.3 ENCOUNTER FOR SCREENING FOR INFECTIONS WITH PREDOMINANTLY SEXUAL MODE OF TRANSMISSION: ICD-10-CM

## 2024-05-02 PROCEDURE — 36415 COLL VENOUS BLD VENIPUNCTURE: CPT | Performed by: OBSTETRICS & GYNECOLOGY

## 2024-05-02 PROCEDURE — 3074F SYST BP LT 130 MM HG: CPT | Mod: CPTII,,, | Performed by: OBSTETRICS & GYNECOLOGY

## 2024-05-02 PROCEDURE — 1159F MED LIST DOCD IN RCRD: CPT | Mod: CPTII,,, | Performed by: OBSTETRICS & GYNECOLOGY

## 2024-05-02 PROCEDURE — 88175 CYTOPATH C/V AUTO FLUID REDO: CPT | Performed by: OBSTETRICS & GYNECOLOGY

## 2024-05-02 PROCEDURE — 86593 SYPHILIS TEST NON-TREP QUANT: CPT | Performed by: OBSTETRICS & GYNECOLOGY

## 2024-05-02 PROCEDURE — 99999 PR PBB SHADOW E&M-EST. PATIENT-LVL III: CPT | Mod: PBBFAC,,, | Performed by: OBSTETRICS & GYNECOLOGY

## 2024-05-02 PROCEDURE — 3078F DIAST BP <80 MM HG: CPT | Mod: CPTII,,, | Performed by: OBSTETRICS & GYNECOLOGY

## 2024-05-02 PROCEDURE — 99213 OFFICE O/P EST LOW 20 MIN: CPT | Mod: PBBFAC,PO | Performed by: OBSTETRICS & GYNECOLOGY

## 2024-05-02 PROCEDURE — 87340 HEPATITIS B SURFACE AG IA: CPT | Performed by: OBSTETRICS & GYNECOLOGY

## 2024-05-02 PROCEDURE — 87389 HIV-1 AG W/HIV-1&-2 AB AG IA: CPT | Performed by: OBSTETRICS & GYNECOLOGY

## 2024-05-02 PROCEDURE — 1160F RVW MEDS BY RX/DR IN RCRD: CPT | Mod: CPTII,,, | Performed by: OBSTETRICS & GYNECOLOGY

## 2024-05-02 PROCEDURE — 3008F BODY MASS INDEX DOCD: CPT | Mod: CPTII,,, | Performed by: OBSTETRICS & GYNECOLOGY

## 2024-05-02 PROCEDURE — 99395 PREV VISIT EST AGE 18-39: CPT | Mod: S$PBB,,, | Performed by: OBSTETRICS & GYNECOLOGY

## 2024-05-02 PROCEDURE — 86803 HEPATITIS C AB TEST: CPT | Performed by: OBSTETRICS & GYNECOLOGY

## 2024-05-02 NOTE — PROGRESS NOTES
OBSTETRIC HISTORY:   OB History          2    Para   1    Term   1            AB   1    Living   1         SAB        IAB        Ectopic        Multiple   0    Live Births   1                COMPREHENSIVE GYN HISTORY:  PAP History: Denies abnormal Paps.  Infection History: Denies STDs. Denies PID.  Benign History: Denies uterine fibroids. Denies ovarian cysts. Denies endometriosis.   Cancer History: Denies cervical cancer. Denies uterine cancer or hyperplasia. Denies ovarian cancer. Denies vulvar cancer or pre-cancer. Denies vaginal cancer or pre-cancer. Denies breast cancer. Denies colon cancer.  Sexual Activity History:   reports being sexually active and has had partner(s) who are male. She reports using the following method of birth control/protection: None. Nexplanon currently  Menstrual History: Currently has irregular periods with Nexplanon  Dysmenorrhea History: Reports no dysmenorrhea.  Contraception: Nexplanon inserted 22    HPI:   24 y.o.  No LMP recorded (lmp unknown). Patient has had an implant.   Patient is  here for her annual gynecologic exam.  She has a white bump on right vulva (had an ingrown hair she pulled out). She denies bladder, breast and bowel complaints.    ROS:  GENERAL: No weight gain or weight loss.   CHEST: No shortness of breath.   ABDOMEN: No abdominal pain, constipation, diarrhea, nausea, vomiting or rectal bleeding.   URINARY: No frequency, dysuria, hematuria, or burning on urination.  REPRODUCTIVE: See HPI.   BREASTS: No breast pain, lumps, or nipple discharge.   PSYCHIATRIC: No depression or anxiety.    PE:   /70   Wt 52.7 kg (116 lb 1.2 oz)   LMP  (LMP Unknown) Comment: implant /  BMI 21.23 kg/m²   APPEARANCE: Well nourished, well developed, in no acute distress.  NECK: Neck symmetric without  thyromegaly.  NODES: No inguinal, cervical lymph node enlargement.  CHEST: Lungs clear to auscultation.  HEART: Regular rate and rhythm, no murmurs, rubs or  gallops.  ABDOMEN: Soft. No tenderness or masses. No hernias.  BREASTS: Symmetrical, no skin changes or visible lesions. No palpable masses, nipple discharge or adenopathy bilaterally.  PELVIC:   VULVA: Small EIC. Normal female genitalia.  URETHRAL MEATUS: Normal size and location, no lesions, no prolapse.  URETHRA: No masses, tenderness, prolapse or scarring.  VAGINA: Moist and well rugated, no discharge, no significant cystocele or rectocele.  CERVIX: No lesions and discharge.  UTERUS: Normal size, regular shape, mobile, non-tender, bladder base nontender.  ADNEXA: No masses or tenderness.    PROCEDURES:  Pap smear  STD testing-- HIV, syphilis, Hepatitis B and C   Recent negative GC/CT    Assessment:  Normal Gynecologic Exam    Plan:  Mammogram and Colonoscopy if indicated by current recommendations.  Return to clinic in one year or for any problems or complaints.    Counseling:  Patient was counseled today on A.C.S. Pap guidelines and recommendations for yearly pelvic exams and monthly self breast exams; to see her PCP for other health maintenance. Regular exercise and healthy diet.     As of April 1, 2021, the Cures Act has been passed nationally. This new law requires that all doctors progress notes, lab results, pathology reports and radiology reports be released IMMEDIATELY to the patient in the patient portal. That means that the results are released to you at the EXACT same time they are released to me. Therefore, with all of the patients that I have I am not able to reply to each patient exactly when the results come in. So there will be a delay from when you see the results to when I see them and have time to come up with a response to send you. Also I only see these results when I am on the computer at work. So if the results come in over the weekend or after 5 pm of a work day, I will not see them until the next business day. As you can tell, this is a challenge as a physician to give every patient the  quick response they hope for and deserve. So please be patient!     Thanks for understanding,

## 2024-05-03 ENCOUNTER — TELEPHONE (OUTPATIENT)
Dept: OBSTETRICS AND GYNECOLOGY | Facility: CLINIC | Age: 24
End: 2024-05-03
Payer: MEDICAID

## 2024-05-03 LAB
HBV SURFACE AG SERPL QL IA: NORMAL
HCV AB SERPL QL IA: NORMAL
HIV 1+2 AB+HIV1 P24 AG SERPL QL IA: NORMAL
TREPONEMA PALLIDUM IGG+IGM AB [PRESENCE] IN SERUM OR PLASMA BY IMMUNOASSAY: NONREACTIVE

## 2024-05-03 NOTE — TELEPHONE ENCOUNTER
Pt called wanted speak about her results and wanted to see about getting HSV testing please advise.

## 2024-05-10 LAB
CLINICAL INFO: ABNORMAL
CYTO CVX: ABNORMAL
CYTOLOGIST CVX/VAG CYTO: ABNORMAL
CYTOLOGIST CVX/VAG CYTO: ABNORMAL
CYTOLOGY CMNT CVX/VAG CYTO-IMP: ABNORMAL
CYTOLOGY PAP THIN PREP EXPLANATION: ABNORMAL
DATE OF PREVIOUS PAP: ABNORMAL
DATE PREVIOUS BX: NO
GEN CATEG CVX/VAG CYTO-IMP: ABNORMAL
LMP START DATE: ABNORMAL
MICROORGANISM CVX/VAG CYTO: ABNORMAL
PATHOLOGIST CVX/VAG CYTO: ABNORMAL
SERVICE CMNT-IMP: ABNORMAL
SPECIMEN SOURCE CVX/VAG CYTO: ABNORMAL
STAT OF ADQ CVX/VAG CYTO-IMP: ABNORMAL

## 2024-05-12 ENCOUNTER — PATIENT MESSAGE (OUTPATIENT)
Dept: OBSTETRICS AND GYNECOLOGY | Facility: CLINIC | Age: 24
End: 2024-05-12
Payer: MEDICAID

## 2024-06-28 ENCOUNTER — PATIENT MESSAGE (OUTPATIENT)
Dept: OBSTETRICS AND GYNECOLOGY | Facility: CLINIC | Age: 24
End: 2024-06-28
Payer: MEDICAID

## 2024-07-17 ENCOUNTER — PATIENT MESSAGE (OUTPATIENT)
Dept: PSYCHIATRY | Facility: CLINIC | Age: 24
End: 2024-07-17
Payer: MEDICAID

## 2024-07-19 ENCOUNTER — PATIENT MESSAGE (OUTPATIENT)
Dept: PSYCHIATRY | Facility: CLINIC | Age: 24
End: 2024-07-19
Payer: MEDICAID

## 2024-07-25 ENCOUNTER — TELEPHONE (OUTPATIENT)
Dept: OBSTETRICS AND GYNECOLOGY | Facility: CLINIC | Age: 24
End: 2024-07-25
Payer: MEDICAID

## 2024-07-25 ENCOUNTER — OFFICE VISIT (OUTPATIENT)
Dept: OBSTETRICS AND GYNECOLOGY | Facility: CLINIC | Age: 24
End: 2024-07-25
Payer: MEDICAID

## 2024-07-25 VITALS
WEIGHT: 127.63 LBS | DIASTOLIC BLOOD PRESSURE: 74 MMHG | SYSTOLIC BLOOD PRESSURE: 109 MMHG | BODY MASS INDEX: 23.35 KG/M2

## 2024-07-25 DIAGNOSIS — R21 RASH AND NONSPECIFIC SKIN ERUPTION: Primary | ICD-10-CM

## 2024-07-25 PROCEDURE — 1159F MED LIST DOCD IN RCRD: CPT | Mod: CPTII,,, | Performed by: OBSTETRICS & GYNECOLOGY

## 2024-07-25 PROCEDURE — 99999 PR PBB SHADOW E&M-EST. PATIENT-LVL III: CPT | Mod: PBBFAC,,, | Performed by: OBSTETRICS & GYNECOLOGY

## 2024-07-25 PROCEDURE — 3078F DIAST BP <80 MM HG: CPT | Mod: CPTII,,, | Performed by: OBSTETRICS & GYNECOLOGY

## 2024-07-25 PROCEDURE — 3008F BODY MASS INDEX DOCD: CPT | Mod: CPTII,,, | Performed by: OBSTETRICS & GYNECOLOGY

## 2024-07-25 PROCEDURE — 99213 OFFICE O/P EST LOW 20 MIN: CPT | Mod: PBBFAC,PO | Performed by: OBSTETRICS & GYNECOLOGY

## 2024-07-25 PROCEDURE — 1160F RVW MEDS BY RX/DR IN RCRD: CPT | Mod: CPTII,,, | Performed by: OBSTETRICS & GYNECOLOGY

## 2024-07-25 PROCEDURE — 3074F SYST BP LT 130 MM HG: CPT | Mod: CPTII,,, | Performed by: OBSTETRICS & GYNECOLOGY

## 2024-07-25 PROCEDURE — 99213 OFFICE O/P EST LOW 20 MIN: CPT | Mod: S$PBB,,, | Performed by: OBSTETRICS & GYNECOLOGY

## 2024-07-25 RX ORDER — TRIAMCINOLONE ACETONIDE 1 MG/G
OINTMENT TOPICAL 2 TIMES DAILY
Qty: 15 G | Refills: 0 | Status: SHIPPED | OUTPATIENT
Start: 2024-07-25

## 2024-07-25 NOTE — TELEPHONE ENCOUNTER
----- Message from Analisa Mcclure sent at 7/25/2024 10:55 AM CDT -----  Type:  Needs Medical Advice    Who Called:  pt     Would the patient rather a call back or a response via [facility]?  Call back   Best Call Back Number:  [phone]  Additional Information:  pt is scheduled for 07/25/24@1:45 and the pt has a question to ask about her appointment

## 2024-07-25 NOTE — PROGRESS NOTES
OBSTETRIC HISTORY:   OB History          2    Para   1    Term   1            AB   1    Living   1         SAB        IAB        Ectopic        Multiple   0    Live Births   1                COMPREHENSIVE GYN HISTORY:  PAP History: Denies abnormal Paps.  Infection History: Denies STDs. Denies PID.  Benign History: Denies uterine fibroids. Denies ovarian cysts. Denies endometriosis.   Cancer History: Denies cervical cancer. Denies uterine cancer or hyperplasia. Denies ovarian cancer. Denies vulvar cancer or pre-cancer. Denies vaginal cancer or pre-cancer. Denies breast cancer. Denies colon cancer.  Sexual Activity History:   reports being sexually active and has had partner(s) who are male. She reports using the following method of birth control/protection: None. Nexplanon currently  Menstrual History: Currently has irregular periods with Nexplanon  Dysmenorrhea History: Reports no dysmenorrhea.  Contraception: Nexplanon     HPI:   24 y.o.  Patient's last menstrual period was 2024.   Patient is  here complaining of Nexplanon bothering her only rarely in certain positions and has rash on right butt cheek. Thinks it is from extra long sanitary pad. She denies bladder, breast and bowel complaints.    ROS:  GENERAL: No weight gain or weight loss.   CHEST: No shortness of breath.   ABDOMEN: No abdominal pain, constipation, diarrhea, nausea, vomiting or rectal bleeding.   URINARY: No frequency, dysuria, hematuria, or burning on urination.  REPRODUCTIVE: See HPI.   BREASTS: No breast pain, lumps, or nipple discharge.   PSYCHIATRIC: No depression or anxiety.        PE:   /74   Wt 57.9 kg (127 lb 10.3 oz)   LMP 2024   BMI 23.35 kg/m²   APPEARANCE: Well nourished, well developed, in no acute distress.  ARM: slight indentation over morgan but well placed and non-tender.  BUTTOCKS: Non-specific right buttock fine rash (poison ivy vs contact dermatitis vs shingles --states no pain)          ASSESSMENT/PLAN:  Nexplanon check  Contact dermatitis--steroid cream  RTO prn

## 2024-07-25 NOTE — TELEPHONE ENCOUNTER
----- Message from Analisa Mcclure sent at 7/25/2024 10:55 AM CDT -----  Type:  Needs Medical Advice    Who Called:  pt     Would the patient rather a call back or a response via [facility]?  Call back   Best Call Back Number:  [phone]  Additional Information:  pt is scheduled for 07/25/24@1:45 and the pt has a question to ask about her appointment

## 2024-07-29 ENCOUNTER — OFFICE VISIT (OUTPATIENT)
Dept: FAMILY MEDICINE | Facility: HOSPITAL | Age: 24
End: 2024-07-29
Payer: MEDICAID

## 2024-07-29 ENCOUNTER — PATIENT MESSAGE (OUTPATIENT)
Dept: FAMILY MEDICINE | Facility: HOSPITAL | Age: 24
End: 2024-07-29

## 2024-07-29 VITALS
BODY MASS INDEX: 22.96 KG/M2 | HEIGHT: 62 IN | SYSTOLIC BLOOD PRESSURE: 110 MMHG | DIASTOLIC BLOOD PRESSURE: 72 MMHG | HEART RATE: 85 BPM | WEIGHT: 124.75 LBS

## 2024-07-29 DIAGNOSIS — M85.89 OSTEOPENIA OF MULTIPLE SITES: Primary | ICD-10-CM

## 2024-07-29 PROCEDURE — 99213 OFFICE O/P EST LOW 20 MIN: CPT

## 2024-07-29 RX ORDER — ALENDRONATE SODIUM 70 MG/1
70 TABLET ORAL
Qty: 4 TABLET | Refills: 11 | Status: CANCELLED | OUTPATIENT
Start: 2024-07-29 | End: 2025-07-29

## 2024-10-15 ENCOUNTER — OFFICE VISIT (OUTPATIENT)
Dept: OBSTETRICS AND GYNECOLOGY | Facility: CLINIC | Age: 24
End: 2024-10-15
Payer: MEDICAID

## 2024-10-15 VITALS
BODY MASS INDEX: 24.19 KG/M2 | WEIGHT: 132.25 LBS | DIASTOLIC BLOOD PRESSURE: 70 MMHG | SYSTOLIC BLOOD PRESSURE: 103 MMHG

## 2024-10-15 DIAGNOSIS — N76.2 ACUTE VULVITIS: Primary | ICD-10-CM

## 2024-10-15 DIAGNOSIS — N64.4 MASTODYNIA: ICD-10-CM

## 2024-10-15 PROCEDURE — 99213 OFFICE O/P EST LOW 20 MIN: CPT | Mod: PBBFAC,PO | Performed by: OBSTETRICS & GYNECOLOGY

## 2024-10-15 PROCEDURE — 1159F MED LIST DOCD IN RCRD: CPT | Mod: CPTII,,, | Performed by: OBSTETRICS & GYNECOLOGY

## 2024-10-15 PROCEDURE — 1160F RVW MEDS BY RX/DR IN RCRD: CPT | Mod: CPTII,,, | Performed by: OBSTETRICS & GYNECOLOGY

## 2024-10-15 PROCEDURE — 99999 PR PBB SHADOW E&M-EST. PATIENT-LVL III: CPT | Mod: PBBFAC,,, | Performed by: OBSTETRICS & GYNECOLOGY

## 2024-10-15 PROCEDURE — 3078F DIAST BP <80 MM HG: CPT | Mod: CPTII,,, | Performed by: OBSTETRICS & GYNECOLOGY

## 2024-10-15 PROCEDURE — 0352U VAGINOSIS SCREEN BY DNA PROBE: CPT | Performed by: OBSTETRICS & GYNECOLOGY

## 2024-10-15 PROCEDURE — 3008F BODY MASS INDEX DOCD: CPT | Mod: CPTII,,, | Performed by: OBSTETRICS & GYNECOLOGY

## 2024-10-15 PROCEDURE — 99213 OFFICE O/P EST LOW 20 MIN: CPT | Mod: S$PBB,,, | Performed by: OBSTETRICS & GYNECOLOGY

## 2024-10-15 PROCEDURE — 3074F SYST BP LT 130 MM HG: CPT | Mod: CPTII,,, | Performed by: OBSTETRICS & GYNECOLOGY

## 2024-10-15 RX ORDER — TRIAMCINOLONE ACETONIDE 1 MG/G
CREAM TOPICAL 2 TIMES DAILY
Qty: 15 G | Refills: 0 | Status: SHIPPED | OUTPATIENT
Start: 2024-10-15

## 2024-10-15 NOTE — PROGRESS NOTES
OBSTETRIC HISTORY:   OB History          2    Para   1    Term   1            AB   1    Living   1         SAB        IAB        Ectopic        Multiple   0    Live Births   1                COMPREHENSIVE GYN HISTORY:  PAP History: Denies abnormal Paps.  Infection History: Denies STDs. Denies PID.  Benign History: Denies uterine fibroids. Denies ovarian cysts. Denies endometriosis.   Cancer History: Denies cervical cancer. Denies uterine cancer or hyperplasia. Denies ovarian cancer. Denies vulvar cancer or pre-cancer. Denies vaginal cancer or pre-cancer. Denies breast cancer. Denies colon cancer.  Sexual Activity History:   reports being sexually active and has had partner(s) who are male. She reports using the following method of birth control/protection: None. Nexplanon currently  Menstrual History: Currently has irregular periods with Nexplanon  Dysmenorrhea History: Reports no dysmenorrhea.  Contraception: Nexplanon        HPI:   24 y.o.  Patient's last menstrual period was 10/13/2024 (exact date).   Patient is  here for bilateral breast pain sides and around nipple x 4 weeks (thought it would improve after period was over). Does not wear a bra. Red spot left labia.        PE:   /70   Wt 60 kg (132 lb 4.4 oz)   LMP 10/13/2024 (Exact Date)   BMI 24.19 kg/m²   APPEARANCE: Well nourished, well developed, in no acute distress.  BREASTS: Symmetrical, no skin changes or visible lesions. No palpable masses, nipple discharge or adenopathy bilaterally. + tenderness bilaterally  PELVIC:   VULVA: Area of erythema left labia at 4-6 o'clock. No distinct abnormal lesions other than bilateral razor burn.  URETHRAL MEATUS: Normal size and location, no lesions, no prolapse.  URETHRA: No masses, tenderness, prolapse or scarring.  VAGINA: Moist and well rugated, old blood discharge, no significant cystocele or rectocele.  CERVIX: No lesions and discharge.  UTERUS: Normal size, regular shape, mobile,  non-tender, bladder base nontender.  ADNEXA: No masses or tenderness.    PROCEDURES:  Affirm    Assessment/Plan:  Acute vulvitis--uses conditioner to shave--does use separate razors. Might need to apply Vaseline to protect labia. Treat with steroid x 2 weeks. Also could be other irritants rule out BV/Yeasy  Mastodynia--needs to wear a bra

## 2024-10-16 LAB
BACTERIAL VAGINOSIS DNA: NOT DETECTED
CANDIDA GLABRATA/KRUSEI: NOT DETECTED
CANDIDA RRNA VAG QL PROBE: NOT DETECTED
TRICHOMONAS VAGINALIS: NOT DETECTED

## 2024-10-24 ENCOUNTER — OFFICE VISIT (OUTPATIENT)
Dept: PSYCHIATRY | Facility: CLINIC | Age: 24
End: 2024-10-24
Payer: MEDICAID

## 2024-10-24 DIAGNOSIS — F41.9 ANXIETY: ICD-10-CM

## 2024-10-24 DIAGNOSIS — F31.71 BIPOLAR DISORDER, IN PARTIAL REMISSION, MOST RECENT EPISODE HYPOMANIC: Primary | ICD-10-CM

## 2024-10-24 DIAGNOSIS — F60.3 BORDERLINE PERSONALITY DISORDER: ICD-10-CM

## 2024-10-24 RX ORDER — LAMOTRIGINE 25 MG/1
TABLET ORAL
Qty: 60 TABLET | Refills: 3 | Status: SHIPPED | OUTPATIENT
Start: 2024-10-24

## 2024-10-24 RX ORDER — BUSPIRONE HYDROCHLORIDE 15 MG/1
TABLET ORAL
Qty: 45 TABLET | Refills: 3 | Status: SHIPPED | OUTPATIENT
Start: 2024-10-24

## 2024-10-24 NOTE — PROGRESS NOTES
Outpatient Psychiatry Follow-Up Visit (MD/NP) - Telemedicine Visit    10/24/2024 10:14 AM  Cydney Patel  2000  [id number]      The patient location is: Patient reported that their location at the time of this visit was in the [facility]         Crisis Disclaimer: Patient was informed that due to the virtual nature of the visit, that if a crisis develops, protocols will be implemented to ensure patient safety, including but not limited to: 1) Initiating a welfare check with local Law Enforcement, 2) Calling 1/National Crisis Hotline, and/or 3) Initiating PEC/CEC procedures.      Visit type: audiovisual    Each patient to whom he or she provides medical services by telemedicine is:  (1) informed of the relationship between the physician and patient and the respective role of any other health care provider with respect to management of the patient; and (2) notified that he or she may decline to receive medical services by telemedicine and may withdraw from such care at any time.        Chief Complaint:  Cydney Patel, a 24 y.o. female,who presents today for follow up of mood swings, depression, and anxiety.  Met with patient.      Interim Events/Subjective Report/Content of Current Session:       Pt was last seen by me in this dept on 12/7/23.    She logged in late for this visit.  Pt states she is doing well overall. She is now working at a [employer] on [employer]. States the environment is much calmer and she enjoys it. She denies current depressed mood, amotivation, anhedonia, and hopelessness. She also denies any current sxs of zechariah or hypomania. However, she does report sxs back in April/May of this year. She reports feeling restless, spending money on frivolous things, starting hobbies she didn't finish, and a decreased need for sleep. She denies engaging in any risky or disinhibited behaviors. States that, at the time, she was only taking lamotrigine 25 mg daily. She finds 75 mg to be  "too much and experiences foggy headedness at that dose. She states she finds 50 mg daily to be the "sweet spot".   She is working out at the gym regularly.   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 She denies any marijuana use and denies the use of any other illicit substances.  She has been out of Buspar for some time and finds her anxiety is increased. She would like to resume taking Buspar again. She was only taking 15 mg q hs but would like to take it BID to have daytime coverage.  Sleeping ok. Appetite is good.    Pt is future oriented and denies any SI/HI. Denies AVH, paranoia and delusions. No objective s/sx of psychosis, zechariah, or hypomania.      Psychotherapy:  Target symptoms: mood swings  Why chosen therapy is appropriate versus another modality: relevant to diagnosis, patient responds to this modality  Outcome monitoring methods: self-report, observation  Therapeutic intervention type: supportive psychotherapy  Topics discussed/themes: building skills sets for symptom management  The patient's response to the intervention is accepting. The patient's progress toward treatment goals is good.   Duration of intervention: 7 minutes.      Psychotropic medication review  Previous Trials-  Prozac  Doxepin  Lexapro  Abilify  Vistaril  Cymbalta    Current meds-  Buspar  Lamotrigine      Review of Systems       Review of Systems   Constitutional:  Negative for chills, fever, malaise/fatigue and weight loss.   Respiratory:  Negative for shortness of breath and wheezing.    Cardiovascular:  Negative for chest pain and palpitations.   Gastrointestinal:  Negative for diarrhea and vomiting.   Musculoskeletal:  Negative for falls and myalgias.   Skin:  Negative for rash.   Neurological:  Negative for tremors, seizures and headaches.   Endo/Heme/Allergies:  Does not bruise/bleed easily.   Psychiatric/Behavioral:          See HPI       Past Medical, Family and Social History: The patient's " past medical, family and social history have been reviewed and updated as appropriate within the electronic medical record - see encounter notes.    Compliance: see HPI    Risk Parameters:  Patient reports no suicidal ideation  Patient reports no homicidal ideation  Patient reports no self-injurious behavior  Patient reports no violent behavior    Exam (detailed: at least 9 elements; comprehensive: all 15 elements)   Constitutional  Vitals:  Most recent vital signs, dated less than 90 days prior to this appointment, were reviewed.   There were no vitals filed for this visit.       Musculoskeletal  Muscle Strength/Tone:  CHAR due to virtual visit   Gait & Station:  CHAR due to virtual visit     Psychiatric      Appearance:  unremarkable, age appropriate, normal weight, well nourished, casually dressed, neatly groomed   Behavior:  normal, friendly and cooperative     Speech:  no latency; no press   Mood & Affect:  euthymic  mood-congruent   Thought Process:  normal and logical   Associations:  intact   Thought Content:  normal, no suicidality, no homicidality, delusions, or paranoia   Insight:  intact   Judgement: behavior is adequate to circumstances, age appropriate   Orientation:  grossly intact   Memory: intact for content of interview   Language: grossly intact   Attention Span & Concentration:  able to focus   Fund of Knowledge:  intact and appropriate to age and level of education     Medications:  Outpatient Encounter Medications as of 10/24/2024   Medication Sig Dispense Refill    busPIRone (BUSPAR) 15 MG tablet Take 1 tablet po q am 30 tablet 3    cetirizine (ZYRTEC) 10 MG tablet Take 1 tablet (10 mg total) by mouth once daily. 90 tablet 1    gabapentin (NEURONTIN) 300 MG capsule Take 1 capsule (300 mg total) by mouth 3 (three) times daily. 90 capsule 11    lamoTRIgine (LAMICTAL) 25 MG tablet Take 3 tablets (75 mg) po q am 90 tablet 3    triamcinolone acetonide 0.1% (KENALOG) 0.1 % cream Apply topically 2  (two) times daily. Apply to vulva until tube completed 15 g 0    [DISCONTINUED] triamcinolone acetonide 0.1% (KENALOG) 0.1 % ointment Apply topically 2 (two) times daily. 15 g 0     Facility-Administered Encounter Medications as of 10/24/2024   Medication Dose Route Frequency Provider Last Rate Last Admin    etonogestreL subdermal device 68 mg  68 mg Implant  Charan Barrett Jr., MD   68 mg at 02/07/22 1415       Allergy:  Review of patient's allergies indicates:   Allergen Reactions    No known drug allergies          Assessment and Diagnosis   Status/Progress: Based on the examination today, the patient's problem(s) is/are well controlled.  New problems have not been presented today.   Co-morbidities are not complicating management of the primary condition.        General Impression:       ICD-10-CM ICD-9-CM   1. Bipolar disorder, in partial remission, most recent episode hypomanic  F31.71 296.80   2. Anxiety  F41.9 300.00   3. Borderline personality disorder  F60.3 301.83         Diff Dx:  Bipolar d/o w/rapid cycling  Drug induced mood d/o      Intervention/Counseling/Treatment Plan     Medication Management: The risks and benefits of medication were discussed with the patient.   Resume Buspar 5-7.5 mg q am and 15 mg q hs  Continue lamotrigine 50 mg q day   Labs: reviewed most recent; order CBC, CMP, and TSH level  Lamictal: Discussed with patient the possibility of developing Rodriguez-Danilo Syndrome (SJS) with the use of Lamictal. Pt was told to STOP taking the Lamictal immediately and to notify the clinic if any rash develops. Advised if he experiences a large, rapidly expanding, blistering/ulcering, or perioral rash to immediately go to ER or call 911. Pt was told not to use any new products that may cause a rash (ex. changing laundry detergent and soap, skin creams/lotions, perfume, etc). Also informed the pt of the most common side effects of the medication including dizziness, sedation, sleep disturbance,  ataxia, somnolence, headache, blurred vision and nausea. Instructed pt that he may develop SJS if he misses more than 4 days of the medicine and will need to begin titration again at 25 mg. The patient expresses understanding of the above and displays the capacity to agree with this treatment given said understanding. Patient also agrees that, currently, the benefits outweigh the risks and would like to pursue treatment at this time.   The treatment plan and follow up plan were reviewed with the patient.  Discussed with patient informed consent, risks vs. benefits, alternative treatments, side effect profile and the inherent unpredictability of individual responses to these treatments. The patient expresses understanding of the above and displays the capacity to agree with this current plan and had no other questions.  Encouraged Patient to keep future appointments.   Take medications as prescribed and abstain from substance abuse.   Present to ED or call 911 for SI/HI plan or intent, psychosis, or medical emergency.      Return to Clinic: 3 months, or sooner if needed        Face-to-face time with the patient:  25 minutes  Total time:  38 minutes of total time spent on the encounter, which includes face to face time and non-face to face time preparing to see the patient (eg, review of tests), Obtaining and/or reviewing separately obtained history, Documenting clinical information in the electronic or other health record, Independently interpreting results (not separately reported) and communicating results to the patient/family/caregiver, or Care coordination (not separately reported).       Keysha Maldonado, MSN, APRN, PMHNP-BC  [facility]

## 2024-10-28 ENCOUNTER — OFFICE VISIT (OUTPATIENT)
Dept: URGENT CARE | Facility: CLINIC | Age: 24
End: 2024-10-28
Payer: MEDICAID

## 2024-10-28 VITALS
SYSTOLIC BLOOD PRESSURE: 104 MMHG | WEIGHT: 132.25 LBS | DIASTOLIC BLOOD PRESSURE: 62 MMHG | HEART RATE: 62 BPM | TEMPERATURE: 98 F | BODY MASS INDEX: 24.34 KG/M2 | RESPIRATION RATE: 20 BRPM | OXYGEN SATURATION: 100 % | HEIGHT: 62 IN

## 2024-10-28 DIAGNOSIS — S00.511A ABRASION OF SKIN OF LIP EXCLUDING VERMILION BORDER, INITIAL ENCOUNTER: Primary | ICD-10-CM

## 2024-10-28 PROCEDURE — 99213 OFFICE O/P EST LOW 20 MIN: CPT | Mod: S$GLB,,,

## 2024-10-28 RX ORDER — AMOXICILLIN 875 MG/1
875 TABLET, FILM COATED ORAL 2 TIMES DAILY
COMMUNITY
Start: 2024-06-29

## 2024-10-28 RX ORDER — PREDNISONE 20 MG/1
20 TABLET ORAL 2 TIMES DAILY
COMMUNITY
Start: 2024-09-07 | End: 2024-10-30

## 2024-10-28 RX ORDER — VALACYCLOVIR HYDROCHLORIDE 1 G/1
1000 TABLET, FILM COATED ORAL 2 TIMES DAILY
COMMUNITY
Start: 2024-10-26 | End: 2024-10-30

## 2024-10-29 ENCOUNTER — NURSE TRIAGE (OUTPATIENT)
Dept: ADMINISTRATIVE | Facility: CLINIC | Age: 24
End: 2024-10-29
Payer: MEDICAID

## 2024-10-30 ENCOUNTER — OFFICE VISIT (OUTPATIENT)
Dept: OBSTETRICS AND GYNECOLOGY | Facility: CLINIC | Age: 24
End: 2024-10-30
Payer: MEDICAID

## 2024-10-30 VITALS — SYSTOLIC BLOOD PRESSURE: 125 MMHG | WEIGHT: 130.13 LBS | BODY MASS INDEX: 23.8 KG/M2 | DIASTOLIC BLOOD PRESSURE: 77 MMHG

## 2024-10-30 DIAGNOSIS — K13.0 LIP LESION: Primary | ICD-10-CM

## 2024-10-30 PROCEDURE — 3074F SYST BP LT 130 MM HG: CPT | Mod: CPTII,,, | Performed by: OBSTETRICS & GYNECOLOGY

## 2024-10-30 PROCEDURE — 99999 PR PBB SHADOW E&M-EST. PATIENT-LVL III: CPT | Mod: PBBFAC,,, | Performed by: OBSTETRICS & GYNECOLOGY

## 2024-10-30 PROCEDURE — 3078F DIAST BP <80 MM HG: CPT | Mod: CPTII,,, | Performed by: OBSTETRICS & GYNECOLOGY

## 2024-10-30 PROCEDURE — 3008F BODY MASS INDEX DOCD: CPT | Mod: CPTII,,, | Performed by: OBSTETRICS & GYNECOLOGY

## 2024-10-30 PROCEDURE — 99213 OFFICE O/P EST LOW 20 MIN: CPT | Mod: S$PBB,,, | Performed by: OBSTETRICS & GYNECOLOGY

## 2024-10-30 PROCEDURE — 87529 HSV DNA AMP PROBE: CPT | Performed by: OBSTETRICS & GYNECOLOGY

## 2024-10-30 PROCEDURE — 1159F MED LIST DOCD IN RCRD: CPT | Mod: CPTII,,, | Performed by: OBSTETRICS & GYNECOLOGY

## 2024-10-30 PROCEDURE — 99213 OFFICE O/P EST LOW 20 MIN: CPT | Mod: PBBFAC,PO | Performed by: OBSTETRICS & GYNECOLOGY

## 2024-10-30 RX ORDER — VALACYCLOVIR HYDROCHLORIDE 500 MG/1
500 TABLET, FILM COATED ORAL DAILY
Qty: 30 TABLET | Refills: 11 | Status: SHIPPED | OUTPATIENT
Start: 2024-10-30

## 2024-10-31 LAB — SPECIMEN SOURCE: NORMAL

## 2024-11-13 ENCOUNTER — HOSPITAL ENCOUNTER (EMERGENCY)
Facility: HOSPITAL | Age: 24
Discharge: HOME | End: 2024-11-13
Attending: EMERGENCY MEDICINE
Payer: MEDICAID

## 2024-11-13 VITALS
RESPIRATION RATE: 18 BRPM | TEMPERATURE: 99 F | HEART RATE: 90 BPM | SYSTOLIC BLOOD PRESSURE: 106 MMHG | BODY MASS INDEX: 23.92 KG/M2 | DIASTOLIC BLOOD PRESSURE: 53 MMHG | OXYGEN SATURATION: 98 % | WEIGHT: 130 LBS | HEIGHT: 62 IN

## 2024-11-13 DIAGNOSIS — R50.9 FEVER: ICD-10-CM

## 2024-11-13 DIAGNOSIS — J10.1 INFLUENZA A: Primary | ICD-10-CM

## 2024-11-13 LAB
B-HCG UR QL: NEGATIVE
CTP QC/QA: YES
POC MOLECULAR INFLUENZA A AGN: POSITIVE
POC MOLECULAR INFLUENZA B AGN: NEGATIVE
SARS-COV-2 RDRP RESP QL NAA+PROBE: NEGATIVE

## 2024-11-13 PROCEDURE — 87502 INFLUENZA DNA AMP PROBE: CPT

## 2024-11-13 PROCEDURE — 81025 URINE PREGNANCY TEST: CPT | Performed by: EMERGENCY MEDICINE

## 2024-11-13 PROCEDURE — 99282 EMERGENCY DEPT VISIT SF MDM: CPT

## 2024-11-13 PROCEDURE — 87635 SARS-COV-2 COVID-19 AMP PRB: CPT | Performed by: EMERGENCY MEDICINE

## 2024-11-13 NOTE — DISCHARGE INSTRUCTIONS
Rest.  Drink plenty of fluids.  Motrin and Tylenol for fever or pain.  You may also take DayQuil and NyQuil for symptoms.

## 2024-11-13 NOTE — ED TRIAGE NOTES
Patient reports body aches, severe headache, fever, nausea and congestion since yesterday. BC powder taking pta

## 2024-11-13 NOTE — ED PROVIDER NOTES
Encounter Date: 11/13/2024       History     Chief Complaint   Patient presents with    General Illness     Patient reports body aches, severe headache, fever, nausea and congestion since yesterday. BC powder taking pta.     Patient is a 24-year-old female who complains of subjective fever, body aches, congestion, and nausea.  These symptoms started yesterday.  No known sick contacts.  She denies vomiting or diarrhea.  She has an occasional dry cough but no shortness of breath.  She took a home COVID test which was negative.      Review of patient's allergies indicates:   Allergen Reactions    No known drug allergies      Past Medical History:   Diagnosis Date    Allergy     Bipolar affective     Depression     Headache(784.0)     migraine    History of psychiatric hospitalization     Oppositional defiant disorder     Panic attacks     Self-harming behavior     Suicide attempt      Past Surgical History:   Procedure Laterality Date    DILATION AND CURETTAGE OF UTERUS  10/25/2021    termination of pregnancy    INSERTION OF CONTRACEPTIVE CAPSULE  2022     Family History   Problem Relation Name Age of Onset    Stroke Father W. Leonid 43        unknown cause    Hypertension Father W. Leonid     Anxiety disorder Father W. Leonid     ADD / ADHD Sister Parker     Migraines Mother Marlee     Anxiety disorder Mother Marlee     Heart attack Maternal Grandmother      Migraines Maternal Grandmother      Heart attack Maternal Grandfather      Stroke Paternal Grandmother      Heart attack Paternal Grandmother      Stroke Paternal Grandfather      Diabetes Neg Hx      Early death Neg Hx      Breast cancer Neg Hx      Colon cancer Neg Hx      Ovarian cancer Neg Hx       Social History     Tobacco Use    Smoking status: Every Day     Types: Vaping with nicotine    Smokeless tobacco: Current   Substance Use Topics    Alcohol use: Yes     Comment: socially    Drug use: No     Review of Systems   Constitutional:  Positive for fever.   HENT:   Positive for congestion.    Respiratory:  Positive for cough. Negative for shortness of breath.    Gastrointestinal:  Negative for diarrhea and vomiting.   Musculoskeletal:  Positive for back pain and myalgias.   All other systems reviewed and are negative.      Physical Exam     Initial Vitals [11/13/24 0620]   BP Pulse Resp Temp SpO2   (!) 117/55 (!) 116 18 99.2 °F (37.3 °C) 96 %      MAP       --         Physical Exam    Nursing note and vitals reviewed.  Constitutional: No distress.   HENT:   Head: Atraumatic. Mouth/Throat: Oropharynx is clear and moist.   Neck: Neck supple.   Cardiovascular:  Normal rate, regular rhythm and normal heart sounds.           Pulmonary/Chest: Breath sounds normal. No respiratory distress. She has no wheezes. She has no rhonchi.   Abdominal: Abdomen is soft. There is no abdominal tenderness.   Musculoskeletal:         General: No edema. Normal range of motion.      Cervical back: Neck supple.     Lymphadenopathy:     She has no cervical adenopathy.   Neurological: She is alert and oriented to person, place, and time.   Skin: Skin is warm and dry.   Psychiatric: Thought content normal.         ED Course   Procedures  Labs Reviewed   POCT INFLUENZA A/B MOLECULAR - Abnormal       Result Value    POC Molecular Influenza A Ag Positive (*)     POC Molecular Influenza B Ag Negative       Acceptable Yes     SARS-COV-2 RDRP GENE    POC Rapid COVID Negative       Acceptable Yes     POCT URINE PREGNANCY    POC Preg Test, Ur Negative       Acceptable Yes            Imaging Results    None          Medications - No data to display  Medical Decision Making  Emergent evaluation of a 24-year-old female with multiple viral like symptoms.  Influenza swab is positive for influenza A.  I have offered the patient Tamiflu for this but she declines.  Treatment will be supportive care with plenty of fluids, Motrin and Tylenol as needed.  She may follow up with  the primary physician as needed but also return to the emergency department for any possible worsening.    Amount and/or Complexity of Data Reviewed  Labs: ordered.     Details: COVID-19 swab is negative.  Influenza swab is positive for influenza A.  Radiology: ordered.                                      Clinical Impression:  Final diagnoses:  [R50.9] Fever  [J10.1] Influenza A (Primary)          ED Disposition Condition    Discharge Stable          ED Prescriptions    None       Follow-up Information       Follow up With Specialties Details Why Contact Info    Trinidad Lino MD Family Medicine  As needed [address] [facility] [address]  [phone]      [facility] - Emergency Dept Emergency Medicine  If symptoms worsen [address] [facility] [id number] [phone]             Rex Kapadia MD  11/13/24 0852

## 2025-01-06 ENCOUNTER — OFFICE VISIT (OUTPATIENT)
Dept: FAMILY MEDICINE | Facility: HOSPITAL | Age: 25
End: 2025-01-06
Payer: MEDICAID

## 2025-01-06 VITALS
DIASTOLIC BLOOD PRESSURE: 73 MMHG | HEART RATE: 82 BPM | HEIGHT: 62 IN | OXYGEN SATURATION: 99 % | WEIGHT: 128.31 LBS | BODY MASS INDEX: 23.61 KG/M2 | SYSTOLIC BLOOD PRESSURE: 109 MMHG

## 2025-01-06 DIAGNOSIS — Z28.21 INFLUENZA VACCINATION DECLINED BY PATIENT: ICD-10-CM

## 2025-01-06 DIAGNOSIS — M53.3 COCCYGEAL PAIN: Primary | ICD-10-CM

## 2025-01-06 PROCEDURE — 99213 OFFICE O/P EST LOW 20 MIN: CPT

## 2025-01-06 NOTE — PROGRESS NOTES
"    [facility] FAMILY PRACTICE CLINIC NOTE  Urgent/Acute Visit      SUBJECTIVE:     Patient: Cydney Patel is a 24 y.o. female.    Chief Compliant:   Chief Complaint   Patient presents with    Low-back Pain    Tailbone Pain       History of Present Illness:  Patient is a 25 y/o F with bipolar disorder, SHIVANI, bilateral sciatica who complains of tailbone pain of 3 weeks duration occurring multiple times per day when bending at the waist. Denies any weakness, numbness, bowel or bladder incontinence or saddle parathesia.   She denies any trauma to the area. Denies pain at night.  Denies night sweats or recent weight loss, fevers or chills.      Review of Systems   Constitutional:  Negative for chills, fever, malaise/fatigue and weight loss.   Musculoskeletal:  Positive for joint pain. Negative for falls.   Neurological:  Negative for sensory change, focal weakness and weakness.     A 10+ review of systems was performed with pertinent positives and negatives noted above in the history of present illness. Other systems were negative unless otherwise specified.    OBJECTIVE:     Vital Signs (Most Recent)  Vitals:    01/06/25 1311   BP: 109/73   Pulse: 82   SpO2: 99%   Weight: 58.2 kg (128 lb 4.9 oz)   Height: 5' 2" (1.575 m)     BMI: Body mass index is 23.47 kg/m².     Physical Exam:  Physical Exam  Constitutional:       Appearance: Normal appearance.   HENT:      Head: Normocephalic.   Musculoskeletal:         General: No swelling, tenderness, deformity or signs of injury.      Comments: No point tenderness at coccyx or lumbar spinous processes   Skin:     Findings: No bruising.   Neurological:      General: No focal deficit present.      Mental Status: She is alert and oriented to person, place, and time.      Sensory: No sensory deficit.      Motor: No weakness.      Gait: Gait normal.   Psychiatric:         Mood and Affect: Mood normal.         Behavior: Behavior normal.          ASSESSMENT:   Cydney Patel is a " 24 y.o. female who presents to clinic to for    1. Coccygeal pain    2. Influenza vaccination declined by patient         PLAN:       Coccygeal pain  Patient well appearing; VSS; not in pain in clinic  Endorses 3 weeks of coccygeal pain worse with bending occurring sporadically multiple times per day.    No point tenderness at coccyx or lumbar spinus process, no focal neurological deficits on exam.  Low concern for trauma, infection or malignancy.  Patient counseled to continue stretching, exercising with light weights.  Patient counseled to use ibuprofen, heating pad as needed.  She was advised to return in 3 weeks if pain is not improving.    Influenza vaccination declined by patient  Patient offered influenza vaccination; patient declined.    Provided patient with anticipatory guidance and return precautions. Treatment plan discussed with patient, all questions answered, and patient acknowledged understanding and verbal agreement.

## 2025-02-21 ENCOUNTER — OFFICE VISIT (OUTPATIENT)
Dept: FAMILY MEDICINE | Facility: HOSPITAL | Age: 25
End: 2025-02-21
Payer: MEDICAID

## 2025-02-21 VITALS
HEIGHT: 62 IN | OXYGEN SATURATION: 99 % | SYSTOLIC BLOOD PRESSURE: 109 MMHG | DIASTOLIC BLOOD PRESSURE: 71 MMHG | BODY MASS INDEX: 23.61 KG/M2 | WEIGHT: 128.31 LBS | HEART RATE: 79 BPM

## 2025-02-21 DIAGNOSIS — M53.3 COCCYGEAL PAIN: Primary | ICD-10-CM

## 2025-02-21 PROCEDURE — 99213 OFFICE O/P EST LOW 20 MIN: CPT

## 2025-02-21 NOTE — PROGRESS NOTES
"  [facility] FAMILY PRACTICE CLINIC NOTE  Follow-up Visit      SUBJECTIVE:     Patient: Cydney Patel is a 24 y.o. female.    Chief Compliant:   Chief Complaint   Patient presents with    Follow-up       History of Present Illness:  Patient is a 25 y/o F with bipolar disorder, SHIVANI, bilateral sciatica who complains of tailbone pain of 3 months duration.  Pain is present when  bending squatting and twisting.  Pain is relieved by heat pack.  She denies any trauma to the area, fevers, chills, night sweats, weight loss, bowel or bladder dysfunction, difficulty walking.         ROS  See HPI  OBJECTIVE:     Vital Signs (Most Recent)  Vitals:    02/21/25 1025   BP: 109/71   Pulse: 79   SpO2: 99%   Weight: 58.2 kg (128 lb 4.9 oz)   Height: 5' 2" (1.575 m)     BMI: Body mass index is 23.47 kg/m².     Physical Exam:  Physical Exam  Vitals reviewed.   Constitutional:       Appearance: Normal appearance.   Musculoskeletal:         General: No swelling, tenderness, deformity or signs of injury.   Neurological:      General: No focal deficit present.      Mental Status: She is alert.      Sensory: No sensory deficit.      Motor: No weakness.   Psychiatric:         Mood and Affect: Mood normal.         Behavior: Behavior normal.          ASSESSMENT:   Cyndey Patel is a 24 y.o. female who presents to clinic to for    1. Coccygeal pain         PLAN:       Coccygeal pain        -    Patient well appearing; VSS.          -    Likely musculoskeletal pain given lack of point tenderness, neurological deficit, or signs of symptoms of infection.  Recommended physical therapy, continued light weight exercise and tylenol, NSAID's, and heating pack as needed.  -     Ambulatory referral/consult to Physical/Occupational Therapy; Future; Expected date: 02/28/2025        Provided patient with anticipatory guidance and return precautions. Treatment plan discussed with patient, all questions answered, and patient acknowledged understanding " and verbal agreement.      Follow-up in: 2 months; or sooner PRN if acute concerns arise.      Case discussed with Dr. CÉSAR Mir MD   [facility] Family Medicine, PGY-1

## 2025-02-24 ENCOUNTER — OFFICE VISIT (OUTPATIENT)
Dept: OBSTETRICS AND GYNECOLOGY | Facility: CLINIC | Age: 25
End: 2025-02-24
Payer: MEDICAID

## 2025-02-24 VITALS
SYSTOLIC BLOOD PRESSURE: 114 MMHG | BODY MASS INDEX: 24.12 KG/M2 | WEIGHT: 131.88 LBS | DIASTOLIC BLOOD PRESSURE: 69 MMHG

## 2025-02-24 DIAGNOSIS — Z30.9 ENCOUNTER FOR CONTRACEPTIVE MANAGEMENT, UNSPECIFIED TYPE: Primary | ICD-10-CM

## 2025-02-24 PROCEDURE — 99999 PR PBB SHADOW E&M-EST. PATIENT-LVL III: CPT | Mod: PBBFAC,,, | Performed by: OBSTETRICS & GYNECOLOGY

## 2025-02-24 PROCEDURE — 99213 OFFICE O/P EST LOW 20 MIN: CPT | Mod: PBBFAC,PO | Performed by: OBSTETRICS & GYNECOLOGY

## 2025-02-24 PROCEDURE — 1159F MED LIST DOCD IN RCRD: CPT | Mod: CPTII,,, | Performed by: OBSTETRICS & GYNECOLOGY

## 2025-02-24 PROCEDURE — 3008F BODY MASS INDEX DOCD: CPT | Mod: CPTII,,, | Performed by: OBSTETRICS & GYNECOLOGY

## 2025-02-24 PROCEDURE — 3078F DIAST BP <80 MM HG: CPT | Mod: CPTII,,, | Performed by: OBSTETRICS & GYNECOLOGY

## 2025-02-24 PROCEDURE — 99213 OFFICE O/P EST LOW 20 MIN: CPT | Mod: S$PBB,,, | Performed by: OBSTETRICS & GYNECOLOGY

## 2025-02-24 PROCEDURE — 1160F RVW MEDS BY RX/DR IN RCRD: CPT | Mod: CPTII,,, | Performed by: OBSTETRICS & GYNECOLOGY

## 2025-02-24 PROCEDURE — 3074F SYST BP LT 130 MM HG: CPT | Mod: CPTII,,, | Performed by: OBSTETRICS & GYNECOLOGY

## 2025-02-24 NOTE — PROGRESS NOTES
OBSTETRIC HISTORY:   OB History          2    Para   1    Term   1            AB   1    Living   1         SAB        IAB        Ectopic        Multiple   0    Live Births   1                COMPREHENSIVE GYN HISTORY:  PAP History: History abnormal Paps.  Infection History: Denies STDs. Denies PID.  Benign History: Denies uterine fibroids. Denies ovarian cysts. Denies endometriosis.   Cancer History: Denies cervical cancer. Denies uterine cancer or hyperplasia. Denies ovarian cancer. Denies vulvar cancer or pre-cancer. Denies vaginal cancer or pre-cancer. Denies breast cancer. Denies colon cancer.  Sexual Activity History:   reports being sexually active and has had partner(s) who are male. She reports using the following method of birth control/protection: None. Nexplanon currently  Menstrual History: Currently has irregular periods with Nexplanon  Dysmenorrhea History: Reports no dysmenorrhea.  Contraception: Nexplanon 22         HPI:   24 y.o.  Patient's last menstrual period was 2025.   Patient is  here to discuss contraception. Is there anything better than Nexplanon? Does not want IUD. She denies bladder, breast and bowel complaints.    ROS:  GENERAL: No weight gain or weight loss.   CHEST: No shortness of breath.   ABDOMEN: No abdominal pain, constipation, diarrhea, nausea, vomiting or rectal bleeding.   URINARY: No frequency, dysuria, hematuria, or burning on urination.  REPRODUCTIVE: See HPI.   BREASTS: No breast pain, lumps, or nipple discharge.   PSYCHIATRIC: No depression or anxiety.        PE:   /69   Wt 59.8 kg (131 lb 14.4 oz)   LMP 2025   BMI 24.12 kg/m²     ASSESSMENT/PLAN:  Contraceptive management  RTO for exchange of Nexplanon

## 2025-03-05 PROBLEM — Z74.09 IMPAIRED FUNCTIONAL MOBILITY AND ACTIVITY TOLERANCE: Status: ACTIVE | Noted: 2025-03-05

## 2025-03-05 PROBLEM — R29.898 WEAKNESS OF LEFT HIP: Status: ACTIVE | Noted: 2025-03-05

## 2025-03-14 ENCOUNTER — OFFICE VISIT (OUTPATIENT)
Dept: OBSTETRICS AND GYNECOLOGY | Facility: CLINIC | Age: 25
End: 2025-03-14
Payer: MEDICAID

## 2025-03-14 VITALS
DIASTOLIC BLOOD PRESSURE: 69 MMHG | SYSTOLIC BLOOD PRESSURE: 108 MMHG | BODY MASS INDEX: 23.85 KG/M2 | WEIGHT: 130.38 LBS

## 2025-03-14 DIAGNOSIS — Z30.46 ENCOUNTER FOR REMOVAL AND REINSERTION OF NEXPLANON: Primary | ICD-10-CM

## 2025-03-14 PROCEDURE — 99999 PR PBB SHADOW E&M-EST. PATIENT-LVL III: CPT | Mod: PBBFAC,,, | Performed by: OBSTETRICS & GYNECOLOGY

## 2025-03-14 PROCEDURE — 99213 OFFICE O/P EST LOW 20 MIN: CPT | Mod: PBBFAC,PO | Performed by: OBSTETRICS & GYNECOLOGY

## 2025-03-14 NOTE — PROCEDURES
Removal and Insertion of Nexplanon    Date/Time: 3/14/2025 8:15 AM    Performed by: Archana Keller MD  Authorized by: Archana Keller MD    Consent:   Consent obtained:  Prior to procedure the appropriate consent was completed and verified  Consent given by:  Patient  Procedure risks and benefits discussed: yes    Patient questions answered: yes    Patient agrees, verbalizes understanding, and wants to proceed: yes    Educational handouts given: yes    Instructions and paperwork completed: yes    Pre-Procedure:   Pre-procedure timeout performed: yes    Prepped with: povidone-iodine    Local anesthetic:  Lidocaine with epinephrine  The site was cleaned and prepped in a sterile fashion: yes    Removal due to expiration: yes    Removal Procedure:   Arm: left arm  Palpation confirms location: yes  Small stab incision was made in arm: yes  Implant grasped by: hemostat  Upon removal device was intact: yes  Removed with no complications: yes    Insertion Procedure:   Small stab incision was made in arm: yes    Left/right:  left arm   68 mg etonogestreL 68 mg  Preloaded Implanon trocar was placed subdermally: yes    Visualization of implant was obtained: yes    Nexplanon was inserted and trocar removed: yes    Visualization of notch in stilette and palpitation of device: yes    Palpation confirms placement by provider and patient: yes (provider only)    Site was closed with steri-strips and pressure bandage applied: yes

## 2025-03-21 ENCOUNTER — PATIENT MESSAGE (OUTPATIENT)
Dept: PSYCHIATRY | Facility: CLINIC | Age: 25
End: 2025-03-21
Payer: MEDICAID

## 2025-03-21 DIAGNOSIS — F60.3 BORDERLINE PERSONALITY DISORDER: ICD-10-CM

## 2025-03-21 DIAGNOSIS — F31.71 BIPOLAR DISORDER, IN PARTIAL REMISSION, MOST RECENT EPISODE HYPOMANIC: ICD-10-CM

## 2025-03-21 RX ORDER — LAMOTRIGINE 25 MG/1
TABLET ORAL
Qty: 60 TABLET | Refills: 0 | Status: SHIPPED | OUTPATIENT
Start: 2025-03-21

## 2025-04-09 DIAGNOSIS — J32.9 RHINOSINUSITIS: ICD-10-CM

## 2025-04-17 DIAGNOSIS — J32.9 RHINOSINUSITIS: ICD-10-CM

## 2025-04-17 RX ORDER — CETIRIZINE HYDROCHLORIDE 10 MG/1
10 TABLET ORAL DAILY
Qty: 90 TABLET | Refills: 1 | Status: SHIPPED | OUTPATIENT
Start: 2025-04-17 | End: 2025-10-14

## 2025-04-23 RX ORDER — CETIRIZINE HYDROCHLORIDE 10 MG/1
10 TABLET ORAL DAILY
Qty: 90 TABLET | Refills: 1 | Status: SHIPPED | OUTPATIENT
Start: 2025-04-23 | End: 2025-10-20

## 2025-05-02 ENCOUNTER — PATIENT MESSAGE (OUTPATIENT)
Dept: PSYCHIATRY | Facility: CLINIC | Age: 25
End: 2025-05-02
Payer: MEDICAID

## 2025-06-09 ENCOUNTER — TELEPHONE (OUTPATIENT)
Dept: OBSTETRICS AND GYNECOLOGY | Facility: CLINIC | Age: 25
End: 2025-06-09
Payer: MEDICAID

## 2025-06-09 DIAGNOSIS — F31.71 BIPOLAR DISORDER, IN PARTIAL REMISSION, MOST RECENT EPISODE HYPOMANIC: ICD-10-CM

## 2025-06-09 DIAGNOSIS — F60.3 BORDERLINE PERSONALITY DISORDER: ICD-10-CM

## 2025-06-09 RX ORDER — LAMOTRIGINE 25 MG/1
TABLET ORAL
Qty: 60 TABLET | Refills: 0 | OUTPATIENT
Start: 2025-06-09

## 2025-06-10 ENCOUNTER — PATIENT MESSAGE (OUTPATIENT)
Dept: FAMILY MEDICINE | Facility: HOSPITAL | Age: 25
End: 2025-06-10
Payer: MEDICAID

## 2025-06-10 ENCOUNTER — OFFICE VISIT (OUTPATIENT)
Dept: OBSTETRICS AND GYNECOLOGY | Facility: CLINIC | Age: 25
End: 2025-06-10
Payer: MEDICAID

## 2025-06-10 VITALS — BODY MASS INDEX: 25.06 KG/M2 | SYSTOLIC BLOOD PRESSURE: 117 MMHG | WEIGHT: 137 LBS | DIASTOLIC BLOOD PRESSURE: 74 MMHG

## 2025-06-10 DIAGNOSIS — L73.1 INGROWN HAIR: Primary | ICD-10-CM

## 2025-06-10 PROCEDURE — 99213 OFFICE O/P EST LOW 20 MIN: CPT | Mod: S$PBB,,, | Performed by: OBSTETRICS & GYNECOLOGY

## 2025-06-10 PROCEDURE — 3008F BODY MASS INDEX DOCD: CPT | Mod: CPTII,,, | Performed by: OBSTETRICS & GYNECOLOGY

## 2025-06-10 PROCEDURE — 99999 PR PBB SHADOW E&M-EST. PATIENT-LVL III: CPT | Mod: PBBFAC,,, | Performed by: OBSTETRICS & GYNECOLOGY

## 2025-06-10 PROCEDURE — 3078F DIAST BP <80 MM HG: CPT | Mod: CPTII,,, | Performed by: OBSTETRICS & GYNECOLOGY

## 2025-06-10 PROCEDURE — 1159F MED LIST DOCD IN RCRD: CPT | Mod: CPTII,,, | Performed by: OBSTETRICS & GYNECOLOGY

## 2025-06-10 PROCEDURE — 3074F SYST BP LT 130 MM HG: CPT | Mod: CPTII,,, | Performed by: OBSTETRICS & GYNECOLOGY

## 2025-06-10 PROCEDURE — 99213 OFFICE O/P EST LOW 20 MIN: CPT | Mod: PBBFAC,PO | Performed by: OBSTETRICS & GYNECOLOGY

## 2025-06-10 RX ORDER — MUPIROCIN 20 MG/G
OINTMENT TOPICAL 3 TIMES DAILY
Qty: 30 G | Refills: 0 | Status: SHIPPED | OUTPATIENT
Start: 2025-06-10

## 2025-06-10 NOTE — PROGRESS NOTES
OBSTETRIC HISTORY:   OB History          2    Para   1    Term   1            AB   1    Living   1         SAB        IAB        Ectopic        Multiple   0    Live Births   1                COMPREHENSIVE GYN HISTORY:  PAP History: History abnormal Paps.  Infection History: Denies STDs. Denies PID.  Benign History: Denies uterine fibroids. Denies ovarian cysts. Denies endometriosis.   Cancer History: Denies cervical cancer. Denies uterine cancer or hyperplasia. Denies ovarian cancer. Denies vulvar cancer or pre-cancer. Denies vaginal cancer or pre-cancer. Denies breast cancer. Denies colon cancer.  Sexual Activity History:   reports being sexually active and has had partner(s) who are male. She reports using the following method of birth control/protection: None. Nexplanon currently  Menstrual History: Currently has irregular periods with Nexplanon  Dysmenorrhea History: Reports no dysmenorrhea.  Contraception: Nexplanon 22    HPI:   25 y.o.  Patient's last menstrual period was 04/15/2025 (approximate).   Patient is  here complaining of pus filled bump on vagina and hemangiomas on vulva getting bigger. She denies bladder, breast and bowel complaints.    ROS:  GENERAL: No weight gain or weight loss.   CHEST: No shortness of breath.   ABDOMEN: No abdominal pain, constipation, diarrhea, nausea, vomiting or rectal bleeding.   URINARY: No frequency, dysuria, hematuria, or burning on urination.  REPRODUCTIVE: See HPI.   BREASTS: No breast pain, lumps, or nipple discharge.   PSYCHIATRIC: No depression or anxiety.        PE:   /74 (Patient Position: Sitting)   Wt 62.1 kg (137 lb 0.3 oz)   LMP 04/15/2025 (Approximate)   BMI 25.06 kg/m²   APPEARANCE: Well nourished, well developed, in no acute distress.  ABDOMEN: Soft. No tenderness or masses. No hernias.  PELVIC:   VULVA: On mons what looks like a healing ingrown hair with minor surrounding cellulitis. 2 small hemangiomas that are   normal.  URETHRAL MEATUS: Normal size and location, no lesions, no prolapse.  URETHRA: No masses, tenderness, prolapse or scarring.  VAGINA: Moist and well rugated, no discharge, no significant cystocele or rectocele.  CERVIX: No lesions and discharge.  UTERUS: Normal size, regular shape, mobile, non-tender, bladder base nontender.  ADNEXA: No masses or tenderness.    ASSESSMENT/PLAN:  Ingrown hair healing--Bactroban ointment x 5 days  Hemangiomas normal  RTO PRN

## 2025-06-11 DIAGNOSIS — F60.3 BORDERLINE PERSONALITY DISORDER: ICD-10-CM

## 2025-06-11 DIAGNOSIS — F31.71 BIPOLAR DISORDER, IN PARTIAL REMISSION, MOST RECENT EPISODE HYPOMANIC: ICD-10-CM

## 2025-06-11 RX ORDER — LAMOTRIGINE 25 MG/1
TABLET ORAL
Qty: 60 TABLET | Refills: 0 | Status: SHIPPED | OUTPATIENT
Start: 2025-06-11

## 2025-06-30 ENCOUNTER — TELEPHONE (OUTPATIENT)
Dept: FAMILY MEDICINE | Facility: HOSPITAL | Age: 25
End: 2025-06-30
Payer: MEDICAID

## 2025-06-30 NOTE — TELEPHONE ENCOUNTER
Copied from CRM #[id number]. Topic: Medications - Medication Refill  >> Jun 27, 2025  4:09 PM Luana wrote:  Type:  RX Refill Request    Who Called:  Pt   Refill or New Rx: Refill   RX Name and Strength: gabapentin 300 mg  Preferred Pharmacy with phone number:[facility] #[id number] - [place] - [address] AT [facility] [address]  [facility] [place] [address]  Phone: [phone] Fax: [phone]  Local or Mail Order: Local   Ordering Provider: Jennifer  Would the patient rather a call back or a response via [facility]? Call   Best Call Back Number:[phone]   Additional Information:

## 2025-06-30 NOTE — TELEPHONE ENCOUNTER
Copied from CRM #[id number]. Topic: Medications - Medication Refill  >> Jun 27, 2025  4:09 PM Luana wrote:  Type:  RX Refill Request    Who Called:  Pt   Refill or New Rx: Refill   RX Name and Strength: gabapentin 300 mg  Preferred Pharmacy with phone number:[facility] #[id number] - [place] - [address] AT [facility] [address]  [address] [place] [place] [address]  Phone: [phone] Fax: [phone]  Local or Mail Order: Local   Ordering Provider: Jennifer  Would the patient rather a call back or a response via [facility]? Call   Best Call Back Number:[phone]   Additional Information:    Patient is not on Gabapentin 300mg, so no refill will be done

## 2025-06-30 NOTE — TELEPHONE ENCOUNTER
Copied from CRM #[id number]. Topic: Medications - Medication Refill  >> Jun 27, 2025  4:09 PM Luana wrote:  Type:  RX Refill Request    Who Called:  Pt   Refill or New Rx: Refill   RX Name and Strength: gabapentin 300 mg  Preferred Pharmacy with phone number:[facility] #[id number] - [place] - [address] AT [facility] [address]  [facility] [place] [address]  Phone: [phone] Fax: [phone]  Local or Mail Order: Local   Ordering Provider: Jennifer  Would the patient rather a call back or a response via [facility]? Call   Best Call Back Number:[phone]   Additional Information:    Patient

## 2025-07-10 ENCOUNTER — OFFICE VISIT (OUTPATIENT)
Dept: PSYCHIATRY | Facility: CLINIC | Age: 25
End: 2025-07-10
Payer: MEDICAID

## 2025-07-10 DIAGNOSIS — F41.9 ANXIETY: ICD-10-CM

## 2025-07-10 DIAGNOSIS — F60.3 BORDERLINE PERSONALITY DISORDER: ICD-10-CM

## 2025-07-10 DIAGNOSIS — F31.71 BIPOLAR DISORDER, IN PARTIAL REMISSION, MOST RECENT EPISODE HYPOMANIC: Primary | ICD-10-CM

## 2025-07-10 PROCEDURE — 1159F MED LIST DOCD IN RCRD: CPT | Mod: CPTII,95,, | Performed by: NURSE PRACTITIONER

## 2025-07-10 PROCEDURE — 1160F RVW MEDS BY RX/DR IN RCRD: CPT | Mod: CPTII,95,, | Performed by: NURSE PRACTITIONER

## 2025-07-10 PROCEDURE — 98006 SYNCH AUDIO-VIDEO EST MOD 30: CPT | Mod: SA,HB,95, | Performed by: NURSE PRACTITIONER

## 2025-07-10 RX ORDER — LAMOTRIGINE 25 MG/1
TABLET ORAL
Qty: 60 TABLET | Refills: 0 | Status: SHIPPED | OUTPATIENT
Start: 2025-07-10

## 2025-07-10 NOTE — PROGRESS NOTES
"Outpatient Psychiatry Follow-Up Visit (MD/NP) - Telemedicine Visit    7/10/2025 10:14 AM  Cydney Patel  2000  [id number]      The patient location is: Patient reported that their location at the time of this visit was in the [facility]         Crisis Disclaimer: Patient was informed that due to the virtual nature of the visit, that if a crisis develops, protocols will be implemented to ensure patient safety, including but not limited to: 1) Initiating a welfare check with local Law Enforcement, 2) Calling 911/National Crisis Hotline, and/or 3) Initiating PEC/CEC procedures.      Visit type: audiovisual    Each patient to whom he or she provides medical services by telemedicine is:  (1) informed of the relationship between the physician and patient and the respective role of any other health care provider with respect to management of the patient; and (2) notified that he or she may decline to receive medical services by telemedicine and may withdraw from such care at any time.        Chief Complaint:  Cydney Patel, a 25 y.o. female,who presents today for follow up of mood swings, depression, and anxiety.  Met with patient.      Interim Events/Subjective Report/Content of Current Session:       Pt was last seen by me in this dept on 10/24/24. Pt logged in 19 minutes late for this visit.    CHIEF COMPLAINT:  Patient presents for follow-up to discuss her current mental health status and medication management.    HPI:  Patient reports that her mood has been good, with low anxiety levels in the past two to 2.5 months. She has not felt the need to take her anxiety medication (Buspar) as frequently during this period. Her sleep has been "really good" recently. 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 These changes appear to have had a positive impact on her mood and overall " "well-being. Patient discloses that a friend  last year, which has been a source of sadness for her. She reports feeling "pretty good for the past few months," attributing part of this to being busy, which she feels distracts her from potential mental health issues. Patient denies experiencing any manic or hypomanic symptoms recently, as well as significant depression, apart from that related to her friend's death.    MEDICATIONS:  Patient is on oral Lamotrigine. She is also on oral Buspirone for anxiety, which she has not needed in the past 2-2.5 months.    LABS:  Labs were ordered on  of last year, including liver function, kidney function, and thyroid function tests. Patient has not completed these tests yet.    Pt is future oriented and denies any SI/HI. Denies AVH, paranoia and delusions. No objective s/sx of psychosis, zechariah, or hypomania.      Psychotherapy:  Target symptoms: mood swings  Why chosen therapy is appropriate versus another modality: relevant to diagnosis, patient responds to this modality  Outcome monitoring methods: self-report, observation  Therapeutic intervention type: supportive psychotherapy  Topics discussed/themes: building skills sets for symptom management  The patient's response to the intervention is accepting. The patient's progress toward treatment goals is good.   Duration of intervention: 5 minutes.      Psychotropic medication review  Previous Trials-  Prozac  Doxepin  Lexapro  Abilify  Vistaril  Cymbalta    Current meds-  Buspar  Lamotrigine      Review of Systems       Review of Systems   Constitutional:  Negative for chills, fever, malaise/fatigue and weight loss.   Respiratory:  Negative for shortness of breath and wheezing.    Cardiovascular:  Negative for chest pain and palpitations.   Gastrointestinal:  Negative for diarrhea and vomiting.   Musculoskeletal:  Negative for falls and myalgias.   Skin:  Negative for rash.   Neurological:  Negative for tremors, " seizures and headaches.   Endo/Heme/Allergies:  Does not bruise/bleed easily.   Psychiatric/Behavioral:          See HPI       Past Medical, Family and Social History: The patient's past medical, family and social history have been reviewed and updated as appropriate within the electronic medical record - see encounter notes.    Compliance: see HPI    Risk Parameters:  Patient reports no suicidal ideation  Patient reports no homicidal ideation  Patient reports no self-injurious behavior  Patient reports no violent behavior    Exam (detailed: at least 9 elements; comprehensive: all 15 elements)   Constitutional  Vitals:  Most recent vital signs, dated greater than 90 days prior to this appointment, were reviewed.   There were no vitals filed for this visit.       Musculoskeletal  Muscle Strength/Tone:  CHAR due to virtual visit   Gait & Station:  CHAR due to virtual visit     Psychiatric      Appearance:  unremarkable, age appropriate, normal weight, well nourished, casually dressed, neatly groomed   Behavior:  normal, friendly and cooperative     Speech:  no latency; no press   Mood & Affect:  euthymic  mood-congruent   Thought Process:  normal and logical   Associations:  intact   Thought Content:  normal, no suicidality, no homicidality, delusions, or paranoia   Insight:  intact   Judgement: behavior is adequate to circumstances, age appropriate   Orientation:  grossly intact   Memory: intact for content of interview   Language: grossly intact   Attention Span & Concentration:  able to focus   Fund of Knowledge:  intact and appropriate to age and level of education     Medications:  Outpatient Encounter Medications as of 7/10/2025   Medication Sig Dispense Refill    busPIRone (BUSPAR) 15 MG tablet Take 1/2 tablet (7.5 mg) po q am and 1 tablet (15 mg) po q hs 45 tablet 3    cetirizine (ZYRTEC) 10 MG tablet Take 1 tablet (10 mg total) by mouth once daily. 90 tablet 1    lamoTRIgine (LAMICTAL) 25 MG tablet Take 2  tablets (50 mg) po q am 60 tablet 0    mupirocin (BACTROBAN) 2 % ointment Apply topically 3 (three) times daily. 30 g 0    valACYclovir (VALTREX) 500 MG tablet Take 1 tablet (500 mg total) by mouth once daily. 30 tablet 11     Facility-Administered Encounter Medications as of 7/10/2025   Medication Dose Route Frequency Provider Last Rate Last Admin    etonogestreL 68 mg intradermal implant 68 mg  68 mg Implant     68 mg at 03/14/25 0815       Allergy:  Review of patient's allergies indicates:   Allergen Reactions    No known drug allergies          Assessment and Diagnosis   Status/Progress: Based on the examination today, the patient's problem(s) is/are well controlled.  New problems have not been presented today.   Co-morbidities are not complicating management of the primary condition.        General Impression:       ICD-10-CM ICD-9-CM   1. Bipolar disorder, in partial remission, most recent episode hypomanic  F31.71 296.80   2. Anxiety  F41.9 300.00   3. Borderline personality disorder  F60.3 301.83           Diff Dx:  Bipolar d/o w/rapid cycling  Drug induced mood d/o      Intervention/Counseling/Treatment Plan     Medication Management: The risks and benefits of medication were discussed with the patient.   Continue Buspar 5-7.5 mg q am and 15 mg q hs prn anxiety  Continue lamotrigine 50 mg q day   Labs: reviewed most recent; reminded pt to have previously ordered labs drawn CBC, CMP, and TSH level   Lamictal: Discussed with patient the possibility of developing Rodriguez-Danilo Syndrome (SJS) with the use of Lamictal. Pt was told to STOP taking the Lamictal immediately and to notify the clinic if any rash develops. Advised if he experiences a large, rapidly expanding, blistering/ulcering, or perioral rash to immediately go to ER or call 911. Pt was told not to use any new products that may cause a rash (ex. changing laundry detergent and soap, skin creams/lotions, perfume, etc). Also informed the pt of the  most common side effects of the medication including dizziness, sedation, sleep disturbance, ataxia, somnolence, headache, blurred vision and nausea. Instructed pt that he may develop SJS if he misses more than 4 days of the medicine and will need to begin titration again at 25 mg. The patient expresses understanding of the above and displays the capacity to agree with this treatment given said understanding. Patient also agrees that, currently, the benefits outweigh the risks and would like to pursue treatment at this time.   The treatment plan and follow up plan were reviewed with the patient.  Discussed with patient informed consent, risks vs. benefits, alternative treatments, side effect profile and the inherent unpredictability of individual responses to these treatments. The patient expresses understanding of the above and displays the capacity to agree with this current plan and had no other questions.  Encouraged Patient to keep future appointments.   Take medications as prescribed and abstain from substance abuse.   Present to ED or call 911 for SI/HI plan or intent, psychosis, or medical emergency.      Return to Clinic: 3 months, or sooner if needed      This note was generated with the assistance of ambient listening technology. Verbal consent was obtained by the patient and accompanying visitor(s) for the recording of patient appointment to facilitate this note. I attest to having reviewed and edited the generated note for accuracy, though some syntax or spelling errors may persist. Please contact the author of this note for any clarification.      Face-to-face time with the patient:  15 minutes  Total time:  19 minutes of total time spent on the encounter, which includes face to face time and non-face to face time preparing to see the patient (eg, review of tests), Obtaining and/or reviewing separately obtained history, Documenting clinical information in the electronic or other health record,  Independently interpreting results (not separately reported) and communicating results to the patient/family/caregiver, or Care coordination (not separately reported).       Keysha Maldonado, MSN, APRN, PMHNP-BC  [facility]

## 2025-07-16 ENCOUNTER — LAB VISIT (OUTPATIENT)
Dept: LAB | Facility: HOSPITAL | Age: 25
End: 2025-07-16
Attending: NURSE PRACTITIONER
Payer: MEDICAID

## 2025-07-16 DIAGNOSIS — F41.9 ANXIETY: ICD-10-CM

## 2025-07-16 DIAGNOSIS — F60.3 BORDERLINE PERSONALITY DISORDER: ICD-10-CM

## 2025-07-16 DIAGNOSIS — F31.71 BIPOLAR DISORDER, IN PARTIAL REMISSION, MOST RECENT EPISODE HYPOMANIC: ICD-10-CM

## 2025-07-16 LAB
ABSOLUTE EOSINOPHIL (OHS): 0.38 K/UL
ABSOLUTE MONOCYTE (OHS): 0.45 K/UL (ref 0.3–1)
ABSOLUTE NEUTROPHIL COUNT (OHS): 4.78 K/UL (ref 1.8–7.7)
ALBUMIN SERPL BCP-MCNC: 4.2 G/DL (ref 3.5–5.2)
ALP SERPL-CCNC: 57 UNIT/L (ref 40–150)
ALT SERPL W/O P-5'-P-CCNC: 13 UNIT/L (ref 10–44)
ANION GAP (OHS): 7 MMOL/L (ref 8–16)
AST SERPL-CCNC: 15 UNIT/L (ref 11–45)
BASOPHILS # BLD AUTO: 0.04 K/UL
BASOPHILS NFR BLD AUTO: 0.6 %
BILIRUB SERPL-MCNC: 0.6 MG/DL (ref 0.1–1)
BUN SERPL-MCNC: 11 MG/DL (ref 6–20)
CALCIUM SERPL-MCNC: 9.4 MG/DL (ref 8.7–10.5)
CHLORIDE SERPL-SCNC: 108 MMOL/L (ref 95–110)
CO2 SERPL-SCNC: 22 MMOL/L (ref 23–29)
CREAT SERPL-MCNC: 0.7 MG/DL (ref 0.5–1.4)
ERYTHROCYTE [DISTWIDTH] IN BLOOD BY AUTOMATED COUNT: 11.6 % (ref 11.5–14.5)
GFR SERPLBLD CREATININE-BSD FMLA CKD-EPI: >60 ML/MIN/1.73/M2
GLUCOSE SERPL-MCNC: 87 MG/DL (ref 70–110)
HCT VFR BLD AUTO: 39.3 % (ref 37–48.5)
HGB BLD-MCNC: 13.2 GM/DL (ref 12–16)
IMM GRANULOCYTES # BLD AUTO: 0.02 K/UL (ref 0–0.04)
IMM GRANULOCYTES NFR BLD AUTO: 0.3 % (ref 0–0.5)
LYMPHOCYTES # BLD AUTO: 1.02 K/UL (ref 1–4.8)
MCH RBC QN AUTO: 29.7 PG (ref 27–31)
MCHC RBC AUTO-ENTMCNC: 33.6 G/DL (ref 32–36)
MCV RBC AUTO: 88 FL (ref 82–98)
NUCLEATED RBC (/100WBC) (OHS): 0 /100 WBC
PLATELET # BLD AUTO: 259 K/UL (ref 150–450)
PMV BLD AUTO: 10.7 FL (ref 9.2–12.9)
POTASSIUM SERPL-SCNC: 4.1 MMOL/L (ref 3.5–5.1)
PROT SERPL-MCNC: 7.3 GM/DL (ref 6–8.4)
RBC # BLD AUTO: 4.45 M/UL (ref 4–5.4)
RELATIVE EOSINOPHIL (OHS): 5.7 %
RELATIVE LYMPHOCYTE (OHS): 15.2 % (ref 18–48)
RELATIVE MONOCYTE (OHS): 6.7 % (ref 4–15)
RELATIVE NEUTROPHIL (OHS): 71.5 % (ref 38–73)
SODIUM SERPL-SCNC: 137 MMOL/L (ref 136–145)
TSH SERPL-ACNC: 1.78 UIU/ML (ref 0.4–4)
WBC # BLD AUTO: 6.69 K/UL (ref 3.9–12.7)

## 2025-07-16 PROCEDURE — 85025 COMPLETE CBC W/AUTO DIFF WBC: CPT

## 2025-07-16 PROCEDURE — 84443 ASSAY THYROID STIM HORMONE: CPT

## 2025-07-16 PROCEDURE — 80053 COMPREHEN METABOLIC PANEL: CPT

## 2025-07-16 PROCEDURE — 36415 COLL VENOUS BLD VENIPUNCTURE: CPT

## 2025-08-27 ENCOUNTER — OFFICE VISIT (OUTPATIENT)
Dept: OBSTETRICS AND GYNECOLOGY | Facility: CLINIC | Age: 25
End: 2025-08-27
Payer: MEDICAID

## 2025-08-27 VITALS
WEIGHT: 139.25 LBS | SYSTOLIC BLOOD PRESSURE: 117 MMHG | BODY MASS INDEX: 25.46 KG/M2 | DIASTOLIC BLOOD PRESSURE: 74 MMHG

## 2025-08-27 DIAGNOSIS — L73.9 FOLLICULITIS: Primary | ICD-10-CM

## 2025-08-27 PROCEDURE — 99999 PR PBB SHADOW E&M-EST. PATIENT-LVL III: CPT | Mod: PBBFAC,,, | Performed by: OBSTETRICS & GYNECOLOGY

## 2025-08-27 PROCEDURE — 1159F MED LIST DOCD IN RCRD: CPT | Mod: CPTII,,, | Performed by: OBSTETRICS & GYNECOLOGY

## 2025-08-27 PROCEDURE — 3008F BODY MASS INDEX DOCD: CPT | Mod: CPTII,,, | Performed by: OBSTETRICS & GYNECOLOGY

## 2025-08-27 PROCEDURE — 1160F RVW MEDS BY RX/DR IN RCRD: CPT | Mod: CPTII,,, | Performed by: OBSTETRICS & GYNECOLOGY

## 2025-08-27 PROCEDURE — 3074F SYST BP LT 130 MM HG: CPT | Mod: CPTII,,, | Performed by: OBSTETRICS & GYNECOLOGY

## 2025-08-27 PROCEDURE — 99212 OFFICE O/P EST SF 10 MIN: CPT | Mod: S$PBB,,, | Performed by: OBSTETRICS & GYNECOLOGY

## 2025-08-27 PROCEDURE — 3078F DIAST BP <80 MM HG: CPT | Mod: CPTII,,, | Performed by: OBSTETRICS & GYNECOLOGY

## 2025-08-27 PROCEDURE — 99213 OFFICE O/P EST LOW 20 MIN: CPT | Mod: PBBFAC,PO | Performed by: OBSTETRICS & GYNECOLOGY
